# Patient Record
Sex: FEMALE | Race: BLACK OR AFRICAN AMERICAN | NOT HISPANIC OR LATINO | Employment: UNEMPLOYED | URBAN - NONMETROPOLITAN AREA
[De-identification: names, ages, dates, MRNs, and addresses within clinical notes are randomized per-mention and may not be internally consistent; named-entity substitution may affect disease eponyms.]

---

## 2017-01-01 ENCOUNTER — APPOINTMENT (OUTPATIENT)
Dept: GENERAL RADIOLOGY | Facility: HOSPITAL | Age: 37
End: 2017-01-01

## 2017-01-01 ENCOUNTER — APPOINTMENT (OUTPATIENT)
Dept: ULTRASOUND IMAGING | Facility: HOSPITAL | Age: 37
End: 2017-01-01

## 2017-01-01 ENCOUNTER — OFFICE VISIT (OUTPATIENT)
Dept: PAIN MEDICINE | Facility: CLINIC | Age: 37
End: 2017-01-01

## 2017-01-01 ENCOUNTER — APPOINTMENT (OUTPATIENT)
Dept: CT IMAGING | Facility: HOSPITAL | Age: 37
End: 2017-01-01

## 2017-01-01 ENCOUNTER — HOSPITAL ENCOUNTER (INPATIENT)
Facility: HOSPITAL | Age: 37
LOS: 11 days | End: 2017-08-13
Attending: EMERGENCY MEDICINE | Admitting: INTERNAL MEDICINE

## 2017-01-01 ENCOUNTER — OFFICE VISIT (OUTPATIENT)
Dept: FAMILY MEDICINE CLINIC | Facility: CLINIC | Age: 37
End: 2017-01-01

## 2017-01-01 VITALS
BODY MASS INDEX: 20.18 KG/M2 | HEIGHT: 65 IN | WEIGHT: 121.1 LBS | DIASTOLIC BLOOD PRESSURE: 64 MMHG | SYSTOLIC BLOOD PRESSURE: 108 MMHG

## 2017-01-01 VITALS
SYSTOLIC BLOOD PRESSURE: 100 MMHG | HEIGHT: 65 IN | WEIGHT: 117.9 LBS | BODY MASS INDEX: 19.64 KG/M2 | DIASTOLIC BLOOD PRESSURE: 54 MMHG

## 2017-01-01 VITALS
WEIGHT: 129.5 LBS | TEMPERATURE: 98 F | OXYGEN SATURATION: 99 % | DIASTOLIC BLOOD PRESSURE: 70 MMHG | HEART RATE: 131 BPM | SYSTOLIC BLOOD PRESSURE: 120 MMHG | BODY MASS INDEX: 21.58 KG/M2 | HEIGHT: 65 IN

## 2017-01-01 VITALS
DIASTOLIC BLOOD PRESSURE: 80 MMHG | SYSTOLIC BLOOD PRESSURE: 140 MMHG | BODY MASS INDEX: 20.09 KG/M2 | WEIGHT: 125 LBS | HEIGHT: 66 IN

## 2017-01-01 VITALS
HEART RATE: 134 BPM | BODY MASS INDEX: 17.48 KG/M2 | WEIGHT: 104.94 LBS | SYSTOLIC BLOOD PRESSURE: 59 MMHG | HEIGHT: 65 IN | TEMPERATURE: 100.2 F | DIASTOLIC BLOOD PRESSURE: 34 MMHG | RESPIRATION RATE: 28 BRPM

## 2017-01-01 DIAGNOSIS — M54.5 CHRONIC BILATERAL LOW BACK PAIN, WITH SCIATICA PRESENCE UNSPECIFIED: ICD-10-CM

## 2017-01-01 DIAGNOSIS — Z74.09 IMPAIRED MOBILITY AND ACTIVITIES OF DAILY LIVING: ICD-10-CM

## 2017-01-01 DIAGNOSIS — M79.18 MYOFACIAL MUSCLE PAIN: Primary | ICD-10-CM

## 2017-01-01 DIAGNOSIS — E87.1 HYPONATREMIA: ICD-10-CM

## 2017-01-01 DIAGNOSIS — Z74.09 IMPAIRED FUNCTIONAL MOBILITY, BALANCE, GAIT, AND ENDURANCE: ICD-10-CM

## 2017-01-01 DIAGNOSIS — Z76.89 ENCOUNTER TO ESTABLISH CARE WITH NEW DOCTOR: ICD-10-CM

## 2017-01-01 DIAGNOSIS — Z78.9 IMPAIRED MOBILITY AND ACTIVITIES OF DAILY LIVING: ICD-10-CM

## 2017-01-01 DIAGNOSIS — K70.31 ASCITES DUE TO ALCOHOLIC CIRRHOSIS (HCC): ICD-10-CM

## 2017-01-01 DIAGNOSIS — M79.18 MYOFACIAL MUSCLE PAIN: ICD-10-CM

## 2017-01-01 DIAGNOSIS — M54.5 CHRONIC MIDLINE LOW BACK PAIN, WITH SCIATICA PRESENCE UNSPECIFIED: ICD-10-CM

## 2017-01-01 DIAGNOSIS — G89.29 OTHER CHRONIC PAIN: ICD-10-CM

## 2017-01-01 DIAGNOSIS — G89.29 CHRONIC BILATERAL LOW BACK PAIN, WITH SCIATICA PRESENCE UNSPECIFIED: ICD-10-CM

## 2017-01-01 DIAGNOSIS — K70.31 ALCOHOLIC CIRRHOSIS OF LIVER WITH ASCITES (HCC): Primary | ICD-10-CM

## 2017-01-01 DIAGNOSIS — F33.1 MODERATE EPISODE OF RECURRENT MAJOR DEPRESSIVE DISORDER (HCC): ICD-10-CM

## 2017-01-01 DIAGNOSIS — M79.18 MYOFASCIAL PAIN: ICD-10-CM

## 2017-01-01 DIAGNOSIS — K70.31 ASCITES DUE TO ALCOHOLIC CIRRHOSIS (HCC): Primary | ICD-10-CM

## 2017-01-01 DIAGNOSIS — G89.29 CHRONIC PELVIC PAIN IN FEMALE: ICD-10-CM

## 2017-01-01 DIAGNOSIS — R10.2 CHRONIC PELVIC PAIN IN FEMALE: ICD-10-CM

## 2017-01-01 DIAGNOSIS — E87.3 RESPIRATORY ALKALOSIS: ICD-10-CM

## 2017-01-01 DIAGNOSIS — M54.5 CHRONIC MIDLINE LOW BACK PAIN, WITH SCIATICA PRESENCE UNSPECIFIED: Primary | ICD-10-CM

## 2017-01-01 DIAGNOSIS — Z87.828 HISTORY OF MOTOR VEHICLE ACCIDENT: ICD-10-CM

## 2017-01-01 DIAGNOSIS — R52 ACUTE PAIN: ICD-10-CM

## 2017-01-01 DIAGNOSIS — G89.29 CHRONIC MIDLINE LOW BACK PAIN, WITH SCIATICA PRESENCE UNSPECIFIED: ICD-10-CM

## 2017-01-01 DIAGNOSIS — R10.2 PELVIC PAIN: ICD-10-CM

## 2017-01-01 DIAGNOSIS — E16.2 HYPOGLYCEMIA: ICD-10-CM

## 2017-01-01 DIAGNOSIS — G89.29 CHRONIC MIDLINE LOW BACK PAIN, WITH SCIATICA PRESENCE UNSPECIFIED: Primary | ICD-10-CM

## 2017-01-01 DIAGNOSIS — M79.18 MYOFASCIAL PAIN: Primary | ICD-10-CM

## 2017-01-01 DIAGNOSIS — D64.9 ANEMIA, UNSPECIFIED TYPE: ICD-10-CM

## 2017-01-01 DIAGNOSIS — G47.01 INSOMNIA DUE TO MEDICAL CONDITION: ICD-10-CM

## 2017-01-01 DIAGNOSIS — K70.31 ALCOHOLIC CIRRHOSIS OF LIVER WITH ASCITES (HCC): ICD-10-CM

## 2017-01-01 LAB
25(OH)D3 SERPL-MCNC: <12.8 NG/ML (ref 30–100)
A1AT SERPL-MCNC: 268 MG/DL (ref 90–200)
A1AT SERPL-MCNC: 275 MG/DL (ref 90–200)
ABO + RH BLD: NORMAL
ABO + RH BLD: NORMAL
ABO GROUP BLD: NORMAL
ABO GROUP BLD: NORMAL
ACTIN IGG SERPL-ACNC: 13 UNITS (ref 0–19)
AFP-TM SERPL-MCNC: 8.8 NG/ML (ref 0–8.3)
ALBUMIN SERPL-MCNC: 1.9 G/DL (ref 3.4–4.8)
ALBUMIN SERPL-MCNC: 2 G/DL (ref 3.4–4.8)
ALBUMIN SERPL-MCNC: 2.1 G/DL (ref 3.4–4.8)
ALBUMIN SERPL-MCNC: 2.1 G/DL (ref 3.4–4.8)
ALBUMIN SERPL-MCNC: 2.7 G/DL (ref 3.4–4.8)
ALBUMIN SERPL-MCNC: 3.1 G/DL (ref 3.4–4.8)
ALBUMIN/GLOB SERPL: 0.7 G/DL (ref 1.1–1.8)
ALBUMIN/GLOB SERPL: 0.7 G/DL (ref 1.1–1.8)
ALBUMIN/GLOB SERPL: 0.8 G/DL (ref 1.1–1.8)
ALBUMIN/GLOB SERPL: 0.9 G/DL (ref 1.1–1.8)
ALBUMIN/GLOB SERPL: 1 G/DL (ref 1.1–1.8)
ALBUMIN/GLOB SERPL: 1.1 G/DL (ref 1.1–1.8)
ALP SERPL-CCNC: 137 U/L (ref 38–126)
ALP SERPL-CCNC: 74 U/L (ref 38–126)
ALP SERPL-CCNC: 75 U/L (ref 38–126)
ALP SERPL-CCNC: 77 U/L (ref 38–126)
ALP SERPL-CCNC: 78 U/L (ref 38–126)
ALP SERPL-CCNC: 79 U/L (ref 38–126)
ALP SERPL-CCNC: 81 U/L (ref 38–126)
ALP SERPL-CCNC: 81 U/L (ref 38–126)
ALP SERPL-CCNC: 86 U/L (ref 38–126)
ALP SERPL-CCNC: 87 U/L (ref 38–126)
ALP SERPL-CCNC: 92 U/L (ref 38–126)
ALP SERPL-CCNC: 99 U/L (ref 38–126)
ALT SERPL W P-5'-P-CCNC: 36 U/L (ref 9–52)
ALT SERPL W P-5'-P-CCNC: 42 U/L (ref 9–52)
ALT SERPL W P-5'-P-CCNC: 42 U/L (ref 9–52)
ALT SERPL W P-5'-P-CCNC: 48 U/L (ref 9–52)
ALT SERPL W P-5'-P-CCNC: 48 U/L (ref 9–52)
ALT SERPL W P-5'-P-CCNC: 51 U/L (ref 9–52)
ALT SERPL W P-5'-P-CCNC: 57 U/L (ref 9–52)
ALT SERPL W P-5'-P-CCNC: 57 U/L (ref 9–52)
ALT SERPL W P-5'-P-CCNC: 63 U/L (ref 9–52)
ALT SERPL W P-5'-P-CCNC: 63 U/L (ref 9–52)
ALT SERPL W P-5'-P-CCNC: 68 U/L (ref 9–52)
ALT SERPL W P-5'-P-CCNC: 93 U/L (ref 9–52)
AMMONIA BLD-SCNC: 12 UMOL/L (ref 9–30)
AMMONIA BLD-SCNC: 59 UMOL/L (ref 9–30)
AMMONIA BLD-SCNC: <9 UMOL/L (ref 9–30)
AMPHET+METHAMPHET UR QL: NEGATIVE
AMYLASE SERPL-CCNC: 318 U/L (ref 50–130)
ANA SER QL: NEGATIVE
ANION GAP SERPL CALCULATED.3IONS-SCNC: 10 MMOL/L (ref 5–15)
ANION GAP SERPL CALCULATED.3IONS-SCNC: 11 MMOL/L (ref 5–15)
ANION GAP SERPL CALCULATED.3IONS-SCNC: 14 MMOL/L (ref 5–15)
ANION GAP SERPL CALCULATED.3IONS-SCNC: 17 MMOL/L (ref 5–15)
ANION GAP SERPL CALCULATED.3IONS-SCNC: 4 MMOL/L (ref 5–15)
ANION GAP SERPL CALCULATED.3IONS-SCNC: 5 MMOL/L (ref 5–15)
ANION GAP SERPL CALCULATED.3IONS-SCNC: 6 MMOL/L (ref 5–15)
ANION GAP SERPL CALCULATED.3IONS-SCNC: 7 MMOL/L (ref 5–15)
ANION GAP SERPL CALCULATED.3IONS-SCNC: 7 MMOL/L (ref 5–15)
ANISOCYTOSIS BLD QL: ABNORMAL
ANISOCYTOSIS BLD QL: NORMAL
APTT PPP: 26 SECONDS (ref 20–40.3)
ARTERIAL PATENCY WRIST A: ABNORMAL
ARTERIAL PATENCY WRIST A: ABNORMAL
AST SERPL-CCNC: 14 U/L (ref 14–36)
AST SERPL-CCNC: 147 U/L (ref 14–36)
AST SERPL-CCNC: 15 U/L (ref 14–36)
AST SERPL-CCNC: 16 U/L (ref 14–36)
AST SERPL-CCNC: 18 U/L (ref 14–36)
AST SERPL-CCNC: 30 U/L (ref 14–36)
AST SERPL-CCNC: 33 U/L (ref 14–36)
AST SERPL-CCNC: 34 U/L (ref 14–36)
AST SERPL-CCNC: 59 U/L (ref 14–36)
AST SERPL-CCNC: 65 U/L (ref 14–36)
AST SERPL-CCNC: 72 U/L (ref 14–36)
AST SERPL-CCNC: 93 U/L (ref 14–36)
ATMOSPHERIC PRESS: ABNORMAL MMHG
ATMOSPHERIC PRESS: ABNORMAL MMHG
BACTERIA BLD CULT: ABNORMAL
BACTERIA ID TEST ISLT QL CULT: ABNORMAL
BACTERIA SPEC AEROBE CULT: NORMAL
BACTERIA UR QL AUTO: ABNORMAL /HPF
BACTERIA UR QL AUTO: ABNORMAL /HPF
BARBITURATES UR QL SCN: NEGATIVE
BASE EXCESS BLDA CALC-SCNC: -0.2 MMOL/L (ref -2.4–2.4)
BASE EXCESS BLDA CALC-SCNC: 2.9 MMOL/L (ref -2.4–2.4)
BASOPHILS # BLD AUTO: 0 10*3/MM3 (ref 0–0.2)
BASOPHILS # BLD AUTO: 0 10*3/MM3 (ref 0–0.2)
BASOPHILS # BLD AUTO: 0.01 10*3/MM3 (ref 0–0.2)
BASOPHILS # BLD AUTO: 0.02 10*3/MM3 (ref 0–0.2)
BASOPHILS # BLD AUTO: 0.03 10*3/MM3 (ref 0–0.2)
BASOPHILS # BLD AUTO: 0.03 10*3/MM3 (ref 0–0.2)
BASOPHILS # BLD MANUAL: 0.06 10*3/MM3 (ref 0–0.2)
BASOPHILS NFR BLD AUTO: 0 % (ref 0–2)
BASOPHILS NFR BLD AUTO: 0 % (ref 0–2)
BASOPHILS NFR BLD AUTO: 0.1 % (ref 0–2)
BASOPHILS NFR BLD AUTO: 0.2 % (ref 0–2)
BASOPHILS NFR BLD AUTO: 0.3 % (ref 0–2)
BASOPHILS NFR BLD AUTO: 0.5 % (ref 0–2)
BASOPHILS NFR BLD AUTO: 1 % (ref 0–2)
BDY SITE: ABNORMAL
BDY SITE: ABNORMAL
BENZODIAZ UR QL SCN: NEGATIVE
BH BB BLOOD EXPIRATION DATE: NORMAL
BH BB BLOOD EXPIRATION DATE: NORMAL
BH BB BLOOD TYPE BARCODE: 5100
BH BB BLOOD TYPE BARCODE: 5100
BH BB DISPENSE STATUS: NORMAL
BH BB DISPENSE STATUS: NORMAL
BH BB PRODUCT CODE: NORMAL
BH BB PRODUCT CODE: NORMAL
BH BB UNIT NUMBER: NORMAL
BH BB UNIT NUMBER: NORMAL
BILIRUB CONJ SERPL-MCNC: 0 MG/DL (ref 0–0.3)
BILIRUB CONJ SERPL-MCNC: 0.2 MG/DL (ref 0–0.3)
BILIRUB CONJ SERPL-MCNC: 0.3 MG/DL (ref 0–0.3)
BILIRUB CONJ SERPL-MCNC: 0.4 MG/DL (ref 0–0.3)
BILIRUB SERPL-MCNC: 1 MG/DL (ref 0.2–1.3)
BILIRUB SERPL-MCNC: 1.1 MG/DL (ref 0.2–1.3)
BILIRUB SERPL-MCNC: 1.2 MG/DL (ref 0.2–1.3)
BILIRUB SERPL-MCNC: 1.2 MG/DL (ref 0.2–1.3)
BILIRUB SERPL-MCNC: 1.4 MG/DL (ref 0.2–1.3)
BILIRUB SERPL-MCNC: 1.5 MG/DL (ref 0.2–1.3)
BILIRUB SERPL-MCNC: 1.6 MG/DL (ref 0.2–1.3)
BILIRUB SERPL-MCNC: 2 MG/DL (ref 0.2–1.3)
BILIRUB SERPL-MCNC: 2.2 MG/DL (ref 0.2–1.3)
BILIRUB SERPL-MCNC: 2.6 MG/DL (ref 0.2–1.3)
BILIRUB SERPL-MCNC: 3.5 MG/DL (ref 0.2–1.3)
BILIRUB SERPL-MCNC: 7.6 MG/DL (ref 0.2–1.3)
BILIRUB UR QL STRIP: ABNORMAL
BILIRUB UR QL STRIP: ABNORMAL
BLD GP AB SCN SERPL QL: NEGATIVE
BLD GP AB SCN SERPL QL: NEGATIVE
BUN BLD-MCNC: 10 MG/DL (ref 7–21)
BUN BLD-MCNC: 11 MG/DL (ref 7–21)
BUN BLD-MCNC: 14 MG/DL (ref 7–21)
BUN BLD-MCNC: 14 MG/DL (ref 7–21)
BUN BLD-MCNC: 18 MG/DL (ref 7–21)
BUN BLD-MCNC: 24 MG/DL (ref 7–21)
BUN BLD-MCNC: 26 MG/DL (ref 7–21)
BUN BLD-MCNC: 26 MG/DL (ref 7–21)
BUN BLD-MCNC: 29 MG/DL (ref 7–21)
BUN BLD-MCNC: 36 MG/DL (ref 7–21)
BUN BLD-MCNC: 38 MG/DL (ref 7–21)
BUN/CREAT SERPL: 22.7 (ref 7–25)
BUN/CREAT SERPL: 25.6 (ref 7–25)
BUN/CREAT SERPL: 26.4 (ref 7–25)
BUN/CREAT SERPL: 26.8 (ref 7–25)
BUN/CREAT SERPL: 26.8 (ref 7–25)
BUN/CREAT SERPL: 31.1 (ref 7–25)
BUN/CREAT SERPL: 33 (ref 7–25)
BUN/CREAT SERPL: 35.3 (ref 7–25)
BUN/CREAT SERPL: 38.7 (ref 7–25)
BUN/CREAT SERPL: 40.4 (ref 7–25)
BUN/CREAT SERPL: 44.8 (ref 7–25)
BUN/CREAT SERPL: 46.4 (ref 7–25)
BUN/CREAT SERPL: 48 (ref 7–25)
CA-I BLD-MCNC: 3.7 MG/DL (ref 4.5–4.9)
CA-I BLD-MCNC: 3.9 MG/DL (ref 4.5–4.9)
CA-I BLD-MCNC: 4 MG/DL (ref 4.5–4.9)
CALCIUM SPEC-SCNC: 6.4 MG/DL (ref 8.4–10.2)
CALCIUM SPEC-SCNC: 6.6 MG/DL (ref 8.4–10.2)
CALCIUM SPEC-SCNC: 6.7 MG/DL (ref 8.4–10.2)
CALCIUM SPEC-SCNC: 6.9 MG/DL (ref 8.4–10.2)
CALCIUM SPEC-SCNC: 6.9 MG/DL (ref 8.4–10.2)
CALCIUM SPEC-SCNC: 7 MG/DL (ref 8.4–10.2)
CALCIUM SPEC-SCNC: 7.1 MG/DL (ref 8.4–10.2)
CALCIUM SPEC-SCNC: 7.6 MG/DL (ref 8.4–10.2)
CALCIUM SPEC-SCNC: 7.8 MG/DL (ref 8.4–10.2)
CALCIUM SPEC-SCNC: 7.8 MG/DL (ref 8.4–10.2)
CALCIUM SPEC-SCNC: 8 MG/DL (ref 8.4–10.2)
CANNABINOIDS SERPL QL: POSITIVE
CERULOPLASMIN SERPL-MCNC: 13 MG/DL (ref 19–39)
CHLORIDE SERPL-SCNC: 100 MMOL/L (ref 95–110)
CHLORIDE SERPL-SCNC: 86 MMOL/L (ref 95–110)
CHLORIDE SERPL-SCNC: 88 MMOL/L (ref 95–110)
CHLORIDE SERPL-SCNC: 92 MMOL/L (ref 95–110)
CHLORIDE SERPL-SCNC: 92 MMOL/L (ref 95–110)
CHLORIDE SERPL-SCNC: 93 MMOL/L (ref 95–110)
CHLORIDE SERPL-SCNC: 94 MMOL/L (ref 95–110)
CHLORIDE SERPL-SCNC: 95 MMOL/L (ref 95–110)
CHLORIDE SERPL-SCNC: 96 MMOL/L (ref 95–110)
CHLORIDE SERPL-SCNC: 97 MMOL/L (ref 95–110)
CHOLEST SERPL-MCNC: <50 MG/DL (ref 0–199)
CLARITY UR: ABNORMAL
CLARITY UR: CLEAR
CLUMPED PLATELETS: NORMAL
CO2 BLDA-SCNC: 22.4 MMOL/L (ref 23–27)
CO2 BLDA-SCNC: 27.1 MMOL/L (ref 23–27)
CO2 SERPL-SCNC: 22 MMOL/L (ref 22–31)
CO2 SERPL-SCNC: 25 MMOL/L (ref 22–31)
CO2 SERPL-SCNC: 26 MMOL/L (ref 22–31)
CO2 SERPL-SCNC: 27 MMOL/L (ref 22–31)
CO2 SERPL-SCNC: 27 MMOL/L (ref 22–31)
CO2 SERPL-SCNC: 29 MMOL/L (ref 22–31)
CO2 SERPL-SCNC: 29 MMOL/L (ref 22–31)
CO2 SERPL-SCNC: 30 MMOL/L (ref 22–31)
CO2 SERPL-SCNC: 31 MMOL/L (ref 22–31)
CO2 SERPL-SCNC: 31 MMOL/L (ref 22–31)
COCAINE UR QL: NEGATIVE
COLOR UR: ABNORMAL
COLOR UR: YELLOW
CREAT BLD-MCNC: 0.41 MG/DL (ref 0.5–1)
CREAT BLD-MCNC: 0.41 MG/DL (ref 0.5–1)
CREAT BLD-MCNC: 0.43 MG/DL (ref 0.5–1)
CREAT BLD-MCNC: 0.44 MG/DL (ref 0.5–1)
CREAT BLD-MCNC: 0.45 MG/DL (ref 0.5–1)
CREAT BLD-MCNC: 0.5 MG/DL (ref 0.5–1)
CREAT BLD-MCNC: 0.51 MG/DL (ref 0.5–1)
CREAT BLD-MCNC: 0.53 MG/DL (ref 0.5–1)
CREAT BLD-MCNC: 0.56 MG/DL (ref 0.5–1)
CREAT BLD-MCNC: 0.58 MG/DL (ref 0.5–1)
CREAT BLD-MCNC: 0.88 MG/DL (ref 0.5–1)
CREAT BLD-MCNC: 0.93 MG/DL (ref 0.5–1)
CREAT BLD-MCNC: 0.94 MG/DL (ref 0.5–1)
CRP SERPL-MCNC: 32.6 MG/DL (ref 0–1)
DEPRECATED RDW RBC AUTO: 46.5 FL (ref 36.4–46.3)
DEPRECATED RDW RBC AUTO: 46.7 FL (ref 36.4–46.3)
DEPRECATED RDW RBC AUTO: 46.8 FL (ref 36.4–46.3)
DEPRECATED RDW RBC AUTO: 47 FL (ref 36.4–46.3)
DEPRECATED RDW RBC AUTO: 47.1 FL (ref 36.4–46.3)
DEPRECATED RDW RBC AUTO: 47.1 FL (ref 36.4–46.3)
DEPRECATED RDW RBC AUTO: 47.3 FL (ref 36.4–46.3)
DEPRECATED RDW RBC AUTO: 47.5 FL (ref 36.4–46.3)
DEPRECATED RDW RBC AUTO: 47.6 FL (ref 36.4–46.3)
DEPRECATED RDW RBC AUTO: 47.7 FL (ref 36.4–46.3)
DEPRECATED RDW RBC AUTO: 47.8 FL (ref 36.4–46.3)
DEPRECATED RDW RBC AUTO: 48.2 FL (ref 36.4–46.3)
DEPRECATED RDW RBC AUTO: 48.2 FL (ref 36.4–46.3)
DEPRECATED RDW RBC AUTO: 48.4 FL (ref 36.4–46.3)
DEPRECATED RDW RBC AUTO: 48.4 FL (ref 36.4–46.3)
DEPRECATED RDW RBC AUTO: 48.5 FL (ref 36.4–46.3)
DEPRECATED RDW RBC AUTO: 48.6 FL (ref 36.4–46.3)
DEPRECATED RDW RBC AUTO: 48.6 FL (ref 36.4–46.3)
DEPRECATED RDW RBC AUTO: 49 FL (ref 36.4–46.3)
DEPRECATED RDW RBC AUTO: 49.1 FL (ref 36.4–46.3)
DEPRECATED RDW RBC AUTO: 49.6 FL (ref 36.4–46.3)
DEPRECATED RDW RBC AUTO: 49.7 FL (ref 36.4–46.3)
DOHLE BODIES: PRESENT
EOSINOPHIL # BLD AUTO: 0 10*3/MM3 (ref 0–0.7)
EOSINOPHIL # BLD AUTO: 0.01 10*3/MM3 (ref 0–0.7)
EOSINOPHIL # BLD AUTO: 0.02 10*3/MM3 (ref 0–0.7)
EOSINOPHIL # BLD AUTO: 0.04 10*3/MM3 (ref 0–0.7)
EOSINOPHIL # BLD AUTO: 0.05 10*3/MM3 (ref 0–0.7)
EOSINOPHIL # BLD MANUAL: 0.42 10*3/MM3 (ref 0–0.7)
EOSINOPHIL NFR BLD AUTO: 0 % (ref 0–7)
EOSINOPHIL NFR BLD AUTO: 0.1 % (ref 0–7)
EOSINOPHIL NFR BLD AUTO: 0.1 % (ref 0–7)
EOSINOPHIL NFR BLD AUTO: 0.2 % (ref 0–7)
EOSINOPHIL NFR BLD AUTO: 0.3 % (ref 0–7)
EOSINOPHIL NFR BLD AUTO: 0.5 % (ref 0–7)
EOSINOPHIL NFR BLD AUTO: 0.6 % (ref 0–7)
EOSINOPHIL NFR BLD MANUAL: 3 % (ref 0–7)
ERYTHROCYTE [DISTWIDTH] IN BLOOD BY AUTOMATED COUNT: 14.7 % (ref 11.5–14.5)
ERYTHROCYTE [DISTWIDTH] IN BLOOD BY AUTOMATED COUNT: 14.8 % (ref 11.5–14.5)
ERYTHROCYTE [DISTWIDTH] IN BLOOD BY AUTOMATED COUNT: 14.9 % (ref 11.5–14.5)
ERYTHROCYTE [DISTWIDTH] IN BLOOD BY AUTOMATED COUNT: 14.9 % (ref 11.5–14.5)
ERYTHROCYTE [DISTWIDTH] IN BLOOD BY AUTOMATED COUNT: 15 % (ref 11.5–14.5)
ERYTHROCYTE [DISTWIDTH] IN BLOOD BY AUTOMATED COUNT: 15.1 % (ref 11.5–14.5)
ERYTHROCYTE [DISTWIDTH] IN BLOOD BY AUTOMATED COUNT: 15.2 % (ref 11.5–14.5)
ERYTHROCYTE [DISTWIDTH] IN BLOOD BY AUTOMATED COUNT: 15.3 % (ref 11.5–14.5)
ERYTHROCYTE [DISTWIDTH] IN BLOOD BY AUTOMATED COUNT: 15.4 % (ref 11.5–14.5)
ERYTHROCYTE [DISTWIDTH] IN BLOOD BY AUTOMATED COUNT: 15.9 % (ref 11.5–14.5)
GFR SERPL CREATININE-BSD FRML MDRD: 141 ML/MIN/1.73 (ref 64–149)
GFR SERPL CREATININE-BSD FRML MDRD: 147 ML/MIN/1.73 (ref 64–149)
GFR SERPL CREATININE-BSD FRML MDRD: 156 ML/MIN/1.73 (ref 64–149)
GFR SERPL CREATININE-BSD FRML MDRD: 164 ML/MIN/1.73 (ref 64–149)
GFR SERPL CREATININE-BSD FRML MDRD: 167 ML/MIN/1.73 (ref 64–149)
GFR SERPL CREATININE-BSD FRML MDRD: 189 ML/MIN/1.73 (ref 64–149)
GFR SERPL CREATININE-BSD FRML MDRD: 194 ML/MIN/1.73 (ref 64–149)
GFR SERPL CREATININE-BSD FRML MDRD: 199 ML/MIN/1.73 (ref 64–149)
GFR SERPL CREATININE-BSD FRML MDRD: 210 ML/MIN/1.73 (ref 64–149)
GFR SERPL CREATININE-BSD FRML MDRD: 210 ML/MIN/1.73 (ref 64–149)
GFR SERPL CREATININE-BSD FRML MDRD: 81 ML/MIN/1.73 (ref 64–149)
GFR SERPL CREATININE-BSD FRML MDRD: 82 ML/MIN/1.73 (ref 64–149)
GFR SERPL CREATININE-BSD FRML MDRD: 87 ML/MIN/1.73 (ref 64–149)
GLOBULIN UR ELPH-MCNC: 2.2 GM/DL (ref 2.3–3.5)
GLOBULIN UR ELPH-MCNC: 2.2 GM/DL (ref 2.3–3.5)
GLOBULIN UR ELPH-MCNC: 2.3 GM/DL (ref 2.3–3.5)
GLOBULIN UR ELPH-MCNC: 2.3 GM/DL (ref 2.3–3.5)
GLOBULIN UR ELPH-MCNC: 2.4 GM/DL (ref 2.3–3.5)
GLOBULIN UR ELPH-MCNC: 2.5 GM/DL (ref 2.3–3.5)
GLOBULIN UR ELPH-MCNC: 2.5 GM/DL (ref 2.3–3.5)
GLOBULIN UR ELPH-MCNC: 2.6 GM/DL (ref 2.3–3.5)
GLOBULIN UR ELPH-MCNC: 2.6 GM/DL (ref 2.3–3.5)
GLOBULIN UR ELPH-MCNC: 2.7 GM/DL (ref 2.3–3.5)
GLOBULIN UR ELPH-MCNC: 2.8 GM/DL (ref 2.3–3.5)
GLOBULIN UR ELPH-MCNC: 2.9 GM/DL (ref 2.3–3.5)
GLUCOSE BLD-MCNC: 106 MG/DL (ref 60–100)
GLUCOSE BLD-MCNC: 116 MG/DL (ref 60–100)
GLUCOSE BLD-MCNC: 34 MG/DL (ref 60–100)
GLUCOSE BLD-MCNC: 45 MG/DL (ref 60–100)
GLUCOSE BLD-MCNC: 64 MG/DL (ref 60–100)
GLUCOSE BLD-MCNC: 66 MG/DL (ref 60–100)
GLUCOSE BLD-MCNC: 68 MG/DL (ref 60–100)
GLUCOSE BLD-MCNC: 70 MG/DL (ref 60–100)
GLUCOSE BLD-MCNC: 73 MG/DL (ref 60–100)
GLUCOSE BLD-MCNC: 73 MG/DL (ref 60–100)
GLUCOSE BLD-MCNC: 74 MG/DL (ref 60–100)
GLUCOSE BLD-MCNC: 98 MG/DL (ref 60–100)
GLUCOSE BLD-MCNC: 99 MG/DL (ref 60–100)
GLUCOSE BLDA-MCNC: 40 MMOL/L
GLUCOSE BLDA-MCNC: 64 MMOL/L
GLUCOSE BLDC GLUCOMTR-MCNC: 106 MG/DL (ref 70–130)
GLUCOSE BLDC GLUCOMTR-MCNC: 162 MG/DL (ref 70–130)
GLUCOSE BLDC GLUCOMTR-MCNC: 67 MG/DL (ref 70–130)
GLUCOSE UR STRIP-MCNC: NEGATIVE MG/DL
GLUCOSE UR STRIP-MCNC: NEGATIVE MG/DL
HCG SERPL QL: NEGATIVE
HCO3 BLDA-SCNC: 21.6 MMOL/L (ref 22–26)
HCO3 BLDA-SCNC: 26 MMOL/L (ref 22–26)
HCT VFR BLD AUTO: 17.7 % (ref 35–45)
HCT VFR BLD AUTO: 19.8 % (ref 35–45)
HCT VFR BLD AUTO: 20.2 % (ref 35–45)
HCT VFR BLD AUTO: 21.4 % (ref 35–45)
HCT VFR BLD AUTO: 24 % (ref 35–45)
HCT VFR BLD AUTO: 24.7 % (ref 35–45)
HCT VFR BLD AUTO: 25.1 % (ref 35–45)
HCT VFR BLD AUTO: 25.5 % (ref 35–45)
HCT VFR BLD AUTO: 25.7 % (ref 35–45)
HCT VFR BLD AUTO: 25.8 % (ref 35–45)
HCT VFR BLD AUTO: 26 % (ref 35–45)
HCT VFR BLD AUTO: 26 % (ref 35–45)
HCT VFR BLD AUTO: 26.3 % (ref 35–45)
HCT VFR BLD AUTO: 26.4 % (ref 35–45)
HCT VFR BLD AUTO: 26.5 % (ref 35–45)
HCT VFR BLD AUTO: 26.6 % (ref 35–45)
HCT VFR BLD AUTO: 26.7 % (ref 35–45)
HCT VFR BLD AUTO: 27 % (ref 35–45)
HCT VFR BLD AUTO: 27.2 % (ref 35–45)
HCT VFR BLD AUTO: 27.7 % (ref 35–45)
HCT VFR BLD AUTO: 27.9 % (ref 35–45)
HCT VFR BLD AUTO: 28.4 % (ref 35–45)
HCT VFR BLD AUTO: 28.7 % (ref 35–45)
HCT VFR BLD AUTO: 29.8 % (ref 35–45)
HCT VFR BLD AUTO: 30 % (ref 35–45)
HCT VFR BLD AUTO: 30.7 % (ref 35–45)
HCT VFR BLD CALC: 28 % (ref 38–47)
HCT VFR BLD CALC: 28 % (ref 38–47)
HGB BLD-MCNC: 10.2 G/DL (ref 12–15.5)
HGB BLD-MCNC: 10.2 G/DL (ref 12–15.5)
HGB BLD-MCNC: 10.3 G/DL (ref 12–15.5)
HGB BLD-MCNC: 5.9 G/DL (ref 12–15.5)
HGB BLD-MCNC: 6.8 G/DL (ref 12–15.5)
HGB BLD-MCNC: 6.9 G/DL (ref 12–15.5)
HGB BLD-MCNC: 7.1 G/DL (ref 12–15.5)
HGB BLD-MCNC: 8.3 G/DL (ref 12–15.5)
HGB BLD-MCNC: 8.4 G/DL (ref 12–15.5)
HGB BLD-MCNC: 8.5 G/DL (ref 12–15.5)
HGB BLD-MCNC: 8.5 G/DL (ref 12–15.5)
HGB BLD-MCNC: 8.6 G/DL (ref 12–15.5)
HGB BLD-MCNC: 8.8 G/DL (ref 12–15.5)
HGB BLD-MCNC: 8.9 G/DL (ref 12–15.5)
HGB BLD-MCNC: 8.9 G/DL (ref 12–15.5)
HGB BLD-MCNC: 9 G/DL (ref 12–15.5)
HGB BLD-MCNC: 9.2 G/DL (ref 12–15.5)
HGB BLD-MCNC: 9.2 G/DL (ref 12–15.5)
HGB BLD-MCNC: 9.3 G/DL (ref 12–15.5)
HGB BLD-MCNC: 9.3 G/DL (ref 12–15.5)
HGB BLD-MCNC: 9.5 G/DL (ref 12–15.5)
HGB BLD-MCNC: 9.6 G/DL (ref 12–15.5)
HGB BLDA-MCNC: 9.5 G/DL (ref 12–16)
HGB BLDA-MCNC: 9.6 G/DL (ref 12–16)
HGB UR QL STRIP.AUTO: ABNORMAL
HGB UR QL STRIP.AUTO: NEGATIVE
HOLD SPECIMEN: NORMAL
HYALINE CASTS UR QL AUTO: ABNORMAL /LPF
HYALINE CASTS UR QL AUTO: ABNORMAL /LPF
HYPOCHROMIA BLD QL: ABNORMAL
HYPOCHROMIA BLD QL: ABNORMAL
HYPOCHROMIA BLD QL: NORMAL
IMM GRANULOCYTES # BLD: 0.01 10*3/MM3 (ref 0–0.02)
IMM GRANULOCYTES # BLD: 0.02 10*3/MM3 (ref 0–0.02)
IMM GRANULOCYTES # BLD: 0.03 10*3/MM3 (ref 0–0.02)
IMM GRANULOCYTES # BLD: 0.04 10*3/MM3 (ref 0–0.02)
IMM GRANULOCYTES # BLD: 0.05 10*3/MM3 (ref 0–0.02)
IMM GRANULOCYTES # BLD: 0.06 10*3/MM3 (ref 0–0.02)
IMM GRANULOCYTES # BLD: 0.07 10*3/MM3 (ref 0–0.02)
IMM GRANULOCYTES # BLD: 0.07 10*3/MM3 (ref 0–0.02)
IMM GRANULOCYTES # BLD: 0.08 10*3/MM3 (ref 0–0.02)
IMM GRANULOCYTES # BLD: 0.09 10*3/MM3 (ref 0–0.02)
IMM GRANULOCYTES # BLD: 0.09 10*3/MM3 (ref 0–0.02)
IMM GRANULOCYTES # BLD: 0.1 10*3/MM3 (ref 0–0.02)
IMM GRANULOCYTES # BLD: 0.9 10*3/MM3 (ref 0–0.02)
IMM GRANULOCYTES NFR BLD: 0.2 % (ref 0–0.5)
IMM GRANULOCYTES NFR BLD: 0.3 % (ref 0–0.5)
IMM GRANULOCYTES NFR BLD: 0.5 % (ref 0–0.5)
IMM GRANULOCYTES NFR BLD: 0.6 % (ref 0–0.5)
IMM GRANULOCYTES NFR BLD: 0.7 % (ref 0–0.5)
IMM GRANULOCYTES NFR BLD: 0.8 % (ref 0–0.5)
IMM GRANULOCYTES NFR BLD: 0.9 % (ref 0–0.5)
IMM GRANULOCYTES NFR BLD: 1 % (ref 0–0.5)
IMM GRANULOCYTES NFR BLD: 1.1 % (ref 0–0.5)
IMM GRANULOCYTES NFR BLD: 1.1 % (ref 0–0.5)
IMM GRANULOCYTES NFR BLD: 1.5 % (ref 0–0.5)
IMM GRANULOCYTES NFR BLD: 1.8 % (ref 0–0.5)
IMM GRANULOCYTES NFR BLD: 2 % (ref 0–0.5)
IMM GRANULOCYTES NFR BLD: 6.4 % (ref 0–0.5)
INR PPP: 0.92 (ref 0.8–1.2)
IRON 24H UR-MRATE: <10 MCG/DL (ref 37–170)
IRON SATN MFR SERPL: ABNORMAL % (ref 15–50)
KETONES UR QL STRIP: ABNORMAL
KETONES UR QL STRIP: ABNORMAL
LEUKOCYTE ESTERASE UR QL STRIP.AUTO: ABNORMAL
LEUKOCYTE ESTERASE UR QL STRIP.AUTO: ABNORMAL
LIPASE SERPL-CCNC: 421 U/L (ref 23–300)
LYMPHOCYTES # BLD AUTO: 0.15 10*3/MM3 (ref 0.6–4.2)
LYMPHOCYTES # BLD AUTO: 0.21 10*3/MM3 (ref 0.6–4.2)
LYMPHOCYTES # BLD AUTO: 0.23 10*3/MM3 (ref 0.6–4.2)
LYMPHOCYTES # BLD AUTO: 0.27 10*3/MM3 (ref 0.6–4.2)
LYMPHOCYTES # BLD AUTO: 0.28 10*3/MM3 (ref 0.6–4.2)
LYMPHOCYTES # BLD AUTO: 0.29 10*3/MM3 (ref 0.6–4.2)
LYMPHOCYTES # BLD AUTO: 0.33 10*3/MM3 (ref 0.6–4.2)
LYMPHOCYTES # BLD AUTO: 0.37 10*3/MM3 (ref 0.6–4.2)
LYMPHOCYTES # BLD AUTO: 0.39 10*3/MM3 (ref 0.6–4.2)
LYMPHOCYTES # BLD AUTO: 0.39 10*3/MM3 (ref 0.6–4.2)
LYMPHOCYTES # BLD AUTO: 0.4 10*3/MM3 (ref 0.6–4.2)
LYMPHOCYTES # BLD AUTO: 0.48 10*3/MM3 (ref 0.6–4.2)
LYMPHOCYTES # BLD AUTO: 0.5 10*3/MM3 (ref 0.6–4.2)
LYMPHOCYTES # BLD AUTO: 0.5 10*3/MM3 (ref 0.6–4.2)
LYMPHOCYTES # BLD AUTO: 0.53 10*3/MM3 (ref 0.6–4.2)
LYMPHOCYTES # BLD AUTO: 0.59 10*3/MM3 (ref 0.6–4.2)
LYMPHOCYTES # BLD AUTO: 0.6 10*3/MM3 (ref 0.6–4.2)
LYMPHOCYTES # BLD AUTO: 0.67 10*3/MM3 (ref 0.6–4.2)
LYMPHOCYTES # BLD MANUAL: 0.36 10*3/MM3 (ref 0.6–4.2)
LYMPHOCYTES # BLD MANUAL: 2.68 10*3/MM3 (ref 0.6–4.2)
LYMPHOCYTES NFR BLD AUTO: 1.8 % (ref 10–50)
LYMPHOCYTES NFR BLD AUTO: 11.6 % (ref 10–50)
LYMPHOCYTES NFR BLD AUTO: 3.5 % (ref 10–50)
LYMPHOCYTES NFR BLD AUTO: 4.4 % (ref 10–50)
LYMPHOCYTES NFR BLD AUTO: 5.1 % (ref 10–50)
LYMPHOCYTES NFR BLD AUTO: 5.4 % (ref 10–50)
LYMPHOCYTES NFR BLD AUTO: 5.5 % (ref 10–50)
LYMPHOCYTES NFR BLD AUTO: 5.6 % (ref 10–50)
LYMPHOCYTES NFR BLD AUTO: 5.6 % (ref 10–50)
LYMPHOCYTES NFR BLD AUTO: 5.9 % (ref 10–50)
LYMPHOCYTES NFR BLD AUTO: 6.2 % (ref 10–50)
LYMPHOCYTES NFR BLD AUTO: 6.3 % (ref 10–50)
LYMPHOCYTES NFR BLD AUTO: 6.4 % (ref 10–50)
LYMPHOCYTES NFR BLD AUTO: 6.4 % (ref 10–50)
LYMPHOCYTES NFR BLD AUTO: 7.1 % (ref 10–50)
LYMPHOCYTES NFR BLD AUTO: 7.6 % (ref 10–50)
LYMPHOCYTES NFR BLD AUTO: 8.2 % (ref 10–50)
LYMPHOCYTES NFR BLD AUTO: 8.4 % (ref 10–50)
LYMPHOCYTES NFR BLD AUTO: 9.2 % (ref 10–50)
LYMPHOCYTES NFR BLD AUTO: 9.3 % (ref 10–50)
LYMPHOCYTES NFR BLD MANUAL: 19 % (ref 10–50)
LYMPHOCYTES NFR BLD MANUAL: 3 % (ref 0–12)
LYMPHOCYTES NFR BLD MANUAL: 6 % (ref 10–50)
LYMPHOCYTES NFR BLD MANUAL: 8 % (ref 0–12)
Lab: NORMAL
Lab: NORMAL
MAGNESIUM SERPL-MCNC: 1.4 MG/DL (ref 1.6–2.3)
MAGNESIUM SERPL-MCNC: 1.4 MG/DL (ref 1.6–2.3)
MAGNESIUM SERPL-MCNC: 1.5 MG/DL (ref 1.6–2.3)
MAGNESIUM SERPL-MCNC: 1.5 MG/DL (ref 1.6–2.3)
MAGNESIUM SERPL-MCNC: 1.6 MG/DL (ref 1.6–2.3)
MAGNESIUM SERPL-MCNC: 1.7 MG/DL (ref 1.6–2.3)
MAGNESIUM SERPL-MCNC: 1.8 MG/DL (ref 1.6–2.3)
MAGNESIUM SERPL-MCNC: 1.9 MG/DL (ref 1.6–2.3)
MAGNESIUM SERPL-MCNC: 2 MG/DL (ref 1.6–2.3)
MCH RBC QN AUTO: 28.6 PG (ref 26.5–34)
MCH RBC QN AUTO: 28.8 PG (ref 26.5–34)
MCH RBC QN AUTO: 29 PG (ref 26.5–34)
MCH RBC QN AUTO: 29.1 PG (ref 26.5–34)
MCH RBC QN AUTO: 29.2 PG (ref 26.5–34)
MCH RBC QN AUTO: 29.3 PG (ref 26.5–34)
MCH RBC QN AUTO: 29.3 PG (ref 26.5–34)
MCH RBC QN AUTO: 29.4 PG (ref 26.5–34)
MCH RBC QN AUTO: 29.4 PG (ref 26.5–34)
MCH RBC QN AUTO: 29.5 PG (ref 26.5–34)
MCH RBC QN AUTO: 29.5 PG (ref 26.5–34)
MCH RBC QN AUTO: 29.6 PG (ref 26.5–34)
MCH RBC QN AUTO: 29.6 PG (ref 26.5–34)
MCH RBC QN AUTO: 29.7 PG (ref 26.5–34)
MCH RBC QN AUTO: 29.9 PG (ref 26.5–34)
MCH RBC QN AUTO: 30 PG (ref 26.5–34)
MCH RBC QN AUTO: 30 PG (ref 26.5–34)
MCH RBC QN AUTO: 30.1 PG (ref 26.5–34)
MCH RBC QN AUTO: 30.1 PG (ref 26.5–34)
MCH RBC QN AUTO: 30.2 PG (ref 26.5–34)
MCH RBC QN AUTO: 30.4 PG (ref 26.5–34)
MCH RBC QN AUTO: 30.5 PG (ref 26.5–34)
MCH RBC QN AUTO: 30.6 PG (ref 26.5–34)
MCH RBC QN AUTO: 30.7 PG (ref 26.5–34)
MCHC RBC AUTO-ENTMCNC: 32.9 G/DL (ref 31.4–36)
MCHC RBC AUTO-ENTMCNC: 33.1 G/DL (ref 31.4–36)
MCHC RBC AUTO-ENTMCNC: 33.1 G/DL (ref 31.4–36)
MCHC RBC AUTO-ENTMCNC: 33.2 G/DL (ref 31.4–36)
MCHC RBC AUTO-ENTMCNC: 33.2 G/DL (ref 31.4–36)
MCHC RBC AUTO-ENTMCNC: 33.3 G/DL (ref 31.4–36)
MCHC RBC AUTO-ENTMCNC: 33.4 G/DL (ref 31.4–36)
MCHC RBC AUTO-ENTMCNC: 33.7 G/DL (ref 31.4–36)
MCHC RBC AUTO-ENTMCNC: 33.8 G/DL (ref 31.4–36)
MCHC RBC AUTO-ENTMCNC: 34 G/DL (ref 31.4–36)
MCHC RBC AUTO-ENTMCNC: 34 G/DL (ref 31.4–36)
MCHC RBC AUTO-ENTMCNC: 34.2 G/DL (ref 31.4–36)
MCHC RBC AUTO-ENTMCNC: 34.3 G/DL (ref 31.4–36)
MCHC RBC AUTO-ENTMCNC: 34.3 G/DL (ref 31.4–36)
MCHC RBC AUTO-ENTMCNC: 34.4 G/DL (ref 31.4–36)
MCHC RBC AUTO-ENTMCNC: 34.4 G/DL (ref 31.4–36)
MCHC RBC AUTO-ENTMCNC: 34.6 G/DL (ref 31.4–36)
MCHC RBC AUTO-ENTMCNC: 34.6 G/DL (ref 31.4–36)
MCHC RBC AUTO-ENTMCNC: 34.7 G/DL (ref 31.4–36)
MCHC RBC AUTO-ENTMCNC: 34.8 G/DL (ref 31.4–36)
MCHC RBC AUTO-ENTMCNC: 35.1 G/DL (ref 31.4–36)
MCHC RBC AUTO-ENTMCNC: 35.3 G/DL (ref 31.4–36)
MCV RBC AUTO: 84.1 FL (ref 80–98)
MCV RBC AUTO: 86.3 FL (ref 80–98)
MCV RBC AUTO: 86.3 FL (ref 80–98)
MCV RBC AUTO: 86.4 FL (ref 80–98)
MCV RBC AUTO: 86.4 FL (ref 80–98)
MCV RBC AUTO: 86.6 FL (ref 80–98)
MCV RBC AUTO: 87 FL (ref 80–98)
MCV RBC AUTO: 87 FL (ref 80–98)
MCV RBC AUTO: 87.1 FL (ref 80–98)
MCV RBC AUTO: 87.1 FL (ref 80–98)
MCV RBC AUTO: 87.2 FL (ref 80–98)
MCV RBC AUTO: 87.5 FL (ref 80–98)
MCV RBC AUTO: 87.7 FL (ref 80–98)
MCV RBC AUTO: 87.8 FL (ref 80–98)
MCV RBC AUTO: 88 FL (ref 80–98)
MCV RBC AUTO: 88.1 FL (ref 80–98)
MCV RBC AUTO: 88.2 FL (ref 80–98)
MCV RBC AUTO: 88.3 FL (ref 80–98)
MCV RBC AUTO: 88.3 FL (ref 80–98)
MCV RBC AUTO: 88.4 FL (ref 80–98)
METHADONE UR QL SCN: NEGATIVE
MODALITY: ABNORMAL
MODALITY: ABNORMAL
MONOCYTES # BLD AUTO: 0.02 10*3/MM3 (ref 0–0.9)
MONOCYTES # BLD AUTO: 0.05 10*3/MM3 (ref 0–0.9)
MONOCYTES # BLD AUTO: 0.05 10*3/MM3 (ref 0–0.9)
MONOCYTES # BLD AUTO: 0.06 10*3/MM3 (ref 0–0.9)
MONOCYTES # BLD AUTO: 0.08 10*3/MM3 (ref 0–0.9)
MONOCYTES # BLD AUTO: 0.12 10*3/MM3 (ref 0–0.9)
MONOCYTES # BLD AUTO: 0.18 10*3/MM3 (ref 0–0.9)
MONOCYTES # BLD AUTO: 0.2 10*3/MM3 (ref 0–0.9)
MONOCYTES # BLD AUTO: 0.2 10*3/MM3 (ref 0–0.9)
MONOCYTES # BLD AUTO: 0.21 10*3/MM3 (ref 0–0.9)
MONOCYTES # BLD AUTO: 0.26 10*3/MM3 (ref 0–0.9)
MONOCYTES # BLD AUTO: 0.28 10*3/MM3 (ref 0–0.9)
MONOCYTES # BLD AUTO: 0.29 10*3/MM3 (ref 0–0.9)
MONOCYTES # BLD AUTO: 0.31 10*3/MM3 (ref 0–0.9)
MONOCYTES # BLD AUTO: 0.31 10*3/MM3 (ref 0–0.9)
MONOCYTES # BLD AUTO: 0.34 10*3/MM3 (ref 0–0.9)
MONOCYTES # BLD AUTO: 0.42 10*3/MM3 (ref 0–0.9)
MONOCYTES # BLD AUTO: 0.44 10*3/MM3 (ref 0–0.9)
MONOCYTES # BLD AUTO: 0.49 10*3/MM3 (ref 0–0.9)
MONOCYTES # BLD AUTO: 0.75 10*3/MM3 (ref 0–0.9)
MONOCYTES NFR BLD AUTO: 0.3 % (ref 0–12)
MONOCYTES NFR BLD AUTO: 0.4 % (ref 0–12)
MONOCYTES NFR BLD AUTO: 0.5 % (ref 0–12)
MONOCYTES NFR BLD AUTO: 1 % (ref 0–12)
MONOCYTES NFR BLD AUTO: 1.1 % (ref 0–12)
MONOCYTES NFR BLD AUTO: 1.1 % (ref 0–12)
MONOCYTES NFR BLD AUTO: 1.4 % (ref 0–12)
MONOCYTES NFR BLD AUTO: 1.4 % (ref 0–12)
MONOCYTES NFR BLD AUTO: 14.6 % (ref 0–12)
MONOCYTES NFR BLD AUTO: 2 % (ref 0–12)
MONOCYTES NFR BLD AUTO: 2.4 % (ref 0–12)
MONOCYTES NFR BLD AUTO: 2.8 % (ref 0–12)
MONOCYTES NFR BLD AUTO: 3 % (ref 0–12)
MONOCYTES NFR BLD AUTO: 3.5 % (ref 0–12)
MONOCYTES NFR BLD AUTO: 4.3 % (ref 0–12)
MONOCYTES NFR BLD AUTO: 4.6 % (ref 0–12)
MONOCYTES NFR BLD AUTO: 4.6 % (ref 0–12)
MONOCYTES NFR BLD AUTO: 5.1 % (ref 0–12)
MONOCYTES NFR BLD AUTO: 5.4 % (ref 0–12)
MONOCYTES NFR BLD AUTO: 5.5 % (ref 0–12)
MYELOCYTES NFR BLD MANUAL: 2 % (ref 0–0)
NEUTROPHILS # BLD AUTO: 10.58 10*3/MM3 (ref 2–8.6)
NEUTROPHILS # BLD AUTO: 12.5 10*3/MM3 (ref 2–8.6)
NEUTROPHILS # BLD AUTO: 3.52 10*3/MM3 (ref 2–8.6)
NEUTROPHILS # BLD AUTO: 3.9 10*3/MM3 (ref 2–8.6)
NEUTROPHILS # BLD AUTO: 4.17 10*3/MM3 (ref 2–8.6)
NEUTROPHILS # BLD AUTO: 4.81 10*3/MM3 (ref 2–8.6)
NEUTROPHILS # BLD AUTO: 4.82 10*3/MM3 (ref 2–8.6)
NEUTROPHILS # BLD AUTO: 4.91 10*3/MM3 (ref 2–8.6)
NEUTROPHILS # BLD AUTO: 4.97 10*3/MM3 (ref 2–8.6)
NEUTROPHILS # BLD AUTO: 5.04 10*3/MM3 (ref 2–8.6)
NEUTROPHILS # BLD AUTO: 5.23 10*3/MM3 (ref 2–8.6)
NEUTROPHILS # BLD AUTO: 5.25 10*3/MM3 (ref 2–8.6)
NEUTROPHILS # BLD AUTO: 5.31 10*3/MM3 (ref 2–8.6)
NEUTROPHILS # BLD AUTO: 5.31 10*3/MM3 (ref 2–8.6)
NEUTROPHILS # BLD AUTO: 5.35 10*3/MM3 (ref 2–8.6)
NEUTROPHILS # BLD AUTO: 5.37 10*3/MM3 (ref 2–8.6)
NEUTROPHILS # BLD AUTO: 5.42 10*3/MM3 (ref 2–8.6)
NEUTROPHILS # BLD AUTO: 5.55 10*3/MM3 (ref 2–8.6)
NEUTROPHILS # BLD AUTO: 5.58 10*3/MM3 (ref 2–8.6)
NEUTROPHILS # BLD AUTO: 7.98 10*3/MM3 (ref 2–8.6)
NEUTROPHILS # BLD AUTO: 8.26 10*3/MM3 (ref 2–8.6)
NEUTROPHILS # BLD AUTO: 8.37 10*3/MM3 (ref 2–8.6)
NEUTROPHILS NFR BLD AUTO: 75.7 % (ref 37–80)
NEUTROPHILS NFR BLD AUTO: 84.6 % (ref 37–80)
NEUTROPHILS NFR BLD AUTO: 85.7 % (ref 37–80)
NEUTROPHILS NFR BLD AUTO: 86.1 % (ref 37–80)
NEUTROPHILS NFR BLD AUTO: 86.5 % (ref 37–80)
NEUTROPHILS NFR BLD AUTO: 87.4 % (ref 37–80)
NEUTROPHILS NFR BLD AUTO: 87.4 % (ref 37–80)
NEUTROPHILS NFR BLD AUTO: 87.9 % (ref 37–80)
NEUTROPHILS NFR BLD AUTO: 88.6 % (ref 37–80)
NEUTROPHILS NFR BLD AUTO: 88.7 % (ref 37–80)
NEUTROPHILS NFR BLD AUTO: 89.7 % (ref 37–80)
NEUTROPHILS NFR BLD AUTO: 90.2 % (ref 37–80)
NEUTROPHILS NFR BLD AUTO: 90.5 % (ref 37–80)
NEUTROPHILS NFR BLD AUTO: 90.5 % (ref 37–80)
NEUTROPHILS NFR BLD AUTO: 90.6 % (ref 37–80)
NEUTROPHILS NFR BLD AUTO: 92.2 % (ref 37–80)
NEUTROPHILS NFR BLD AUTO: 92.5 % (ref 37–80)
NEUTROPHILS NFR BLD AUTO: 93.1 % (ref 37–80)
NEUTROPHILS NFR BLD AUTO: 93.2 % (ref 37–80)
NEUTROPHILS NFR BLD AUTO: 94.4 % (ref 37–80)
NEUTROPHILS NFR BLD MANUAL: 67 % (ref 37–80)
NEUTROPHILS NFR BLD MANUAL: 75 % (ref 37–80)
NEUTS BAND NFR BLD MANUAL: 16 % (ref 0–5)
NEUTS VAC BLD QL SMEAR: ABNORMAL
NEUTS VAC BLD QL SMEAR: NORMAL
NITRITE UR QL STRIP: NEGATIVE
NITRITE UR QL STRIP: NEGATIVE
NRBC BLD MANUAL-RTO: 0 /100 WBC (ref 0–0)
NRBC BLD MANUAL-RTO: 0.3 /100 WBC (ref 0–0)
NRBC BLD MANUAL-RTO: 0.4 /100 WBC (ref 0–0)
OPIATES UR QL: POSITIVE
OSMOLALITY UR: 621 MOSM/KG (ref 38–1400)
OXYCODONE UR QL SCN: NEGATIVE
PCO2 BLDA: 25.7 MM HG (ref 35–45)
PCO2 BLDA: 34.2 MM HG (ref 35–45)
PH BLDA: 7.5 PH UNITS (ref 7.35–7.45)
PH BLDA: 7.54 PH UNITS (ref 7.35–7.45)
PH UR STRIP.AUTO: 6 [PH] (ref 5–9)
PH UR STRIP.AUTO: 6.5 [PH] (ref 5–9)
PHENOTYPE: ABNORMAL
PHOSPHATE SERPL-MCNC: 3.6 MG/DL (ref 2.4–4.4)
PLATELET # BLD AUTO: 17 10*3/MM3 (ref 150–450)
PLATELET # BLD AUTO: 27 10*3/MM3 (ref 150–450)
PLATELET # BLD AUTO: 29 10*3/MM3 (ref 150–450)
PLATELET # BLD AUTO: 30 10*3/MM3 (ref 150–450)
PLATELET # BLD AUTO: 31 10*3/MM3 (ref 150–450)
PLATELET # BLD AUTO: 32 10*3/MM3 (ref 150–450)
PLATELET # BLD AUTO: 33 10*3/MM3 (ref 150–450)
PLATELET # BLD AUTO: 33 10*3/MM3 (ref 150–450)
PLATELET # BLD AUTO: 34 10*3/MM3 (ref 150–450)
PLATELET # BLD AUTO: 34 10*3/MM3 (ref 150–450)
PLATELET # BLD AUTO: 35 10*3/MM3 (ref 150–450)
PLATELET # BLD AUTO: 37 10*3/MM3 (ref 150–450)
PLATELET # BLD AUTO: 40 10*3/MM3 (ref 150–450)
PLATELET # BLD AUTO: 41 10*3/MM3 (ref 150–450)
PLATELET # BLD AUTO: 42 10*3/MM3 (ref 150–450)
PLATELET # BLD AUTO: 45 10*3/MM3 (ref 150–450)
PLATELET # BLD AUTO: 46 10*3/MM3 (ref 150–450)
PLATELET # BLD AUTO: 48 10*3/MM3 (ref 150–450)
PLATELET # BLD AUTO: 50 10*3/MM3 (ref 150–450)
PLATELET # BLD AUTO: 55 10*3/MM3 (ref 150–450)
PLATELET # BLD AUTO: 57 10*3/MM3 (ref 150–450)
PLATELET # BLD AUTO: 87 10*3/MM3 (ref 150–450)
PMV BLD AUTO: 10.5 FL (ref 8–12)
PMV BLD AUTO: 10.5 FL (ref 8–12)
PMV BLD AUTO: 10.6 FL (ref 8–12)
PMV BLD AUTO: 10.7 FL (ref 8–12)
PMV BLD AUTO: 10.8 FL (ref 8–12)
PMV BLD AUTO: 10.9 FL (ref 8–12)
PMV BLD AUTO: 10.9 FL (ref 8–12)
PMV BLD AUTO: 11.1 FL (ref 8–12)
PMV BLD AUTO: 11.3 FL (ref 8–12)
PMV BLD AUTO: 11.4 FL (ref 8–12)
PMV BLD AUTO: 11.4 FL (ref 8–12)
PMV BLD AUTO: 11.6 FL (ref 8–12)
PMV BLD AUTO: 11.7 FL (ref 8–12)
PMV BLD AUTO: 11.7 FL (ref 8–12)
PMV BLD AUTO: 12.1 FL (ref 8–12)
PMV BLD AUTO: 12.1 FL (ref 8–12)
PMV BLD AUTO: 12.3 FL (ref 8–12)
PMV BLD AUTO: 12.3 FL (ref 8–12)
PMV BLD AUTO: 12.4 FL (ref 8–12)
PMV BLD AUTO: 12.6 FL (ref 8–12)
PMV BLD AUTO: 12.7 FL (ref 8–12)
PMV BLD AUTO: 12.8 FL (ref 8–12)
PMV BLD AUTO: 13.1 FL (ref 8–12)
PMV BLD AUTO: 13.1 FL (ref 8–12)
PMV BLD AUTO: 13.3 FL (ref 8–12)
PMV BLD AUTO: ABNORMAL FL (ref 8–12)
PO2 BLDA: 352.1 MM HG (ref 80–105)
PO2 BLDA: 99.5 MM HG (ref 80–105)
POIKILOCYTOSIS BLD QL SMEAR: NORMAL
POIKILOCYTOSIS BLD QL SMEAR: NORMAL
POLYCHROMASIA BLD QL SMEAR: NORMAL
POTASSIUM BLD-SCNC: 2.7 MMOL/L (ref 3.5–5.1)
POTASSIUM BLD-SCNC: 3 MMOL/L (ref 3.5–5.1)
POTASSIUM BLD-SCNC: 3 MMOL/L (ref 3.5–5.1)
POTASSIUM BLD-SCNC: 3.1 MMOL/L (ref 3.5–5.1)
POTASSIUM BLD-SCNC: 3.2 MMOL/L (ref 3.5–5.1)
POTASSIUM BLD-SCNC: 3.4 MMOL/L (ref 3.5–5.1)
POTASSIUM BLD-SCNC: 3.5 MMOL/L (ref 3.5–5.1)
POTASSIUM BLD-SCNC: 3.6 MMOL/L (ref 3.5–5.1)
POTASSIUM BLD-SCNC: 3.7 MMOL/L (ref 3.5–5.1)
POTASSIUM BLD-SCNC: 3.7 MMOL/L (ref 3.5–5.1)
POTASSIUM BLD-SCNC: 4 MMOL/L (ref 3.5–5.1)
POTASSIUM BLD-SCNC: 4 MMOL/L (ref 3.5–5.1)
POTASSIUM BLD-SCNC: 4.1 MMOL/L (ref 3.5–5.1)
POTASSIUM BLD-SCNC: 4.3 MMOL/L (ref 3.5–5.1)
POTASSIUM BLDA-SCNC: 2.74 MMOL/L (ref 3.6–4.9)
POTASSIUM BLDA-SCNC: 4.39 MMOL/L (ref 3.6–4.9)
PREALB SERPL-MCNC: 5.6 MG/DL (ref 17.6–36)
PROT SERPL-MCNC: 4.1 G/DL (ref 6.3–8.6)
PROT SERPL-MCNC: 4.2 G/DL (ref 6.3–8.6)
PROT SERPL-MCNC: 4.2 G/DL (ref 6.3–8.6)
PROT SERPL-MCNC: 4.3 G/DL (ref 6.3–8.6)
PROT SERPL-MCNC: 4.4 G/DL (ref 6.3–8.6)
PROT SERPL-MCNC: 4.5 G/DL (ref 6.3–8.6)
PROT SERPL-MCNC: 4.5 G/DL (ref 6.3–8.6)
PROT SERPL-MCNC: 4.6 G/DL (ref 6.3–8.6)
PROT SERPL-MCNC: 4.7 G/DL (ref 6.3–8.6)
PROT SERPL-MCNC: 4.8 G/DL (ref 6.3–8.6)
PROT SERPL-MCNC: 5.3 G/DL (ref 6.3–8.6)
PROT SERPL-MCNC: 6 G/DL (ref 6.3–8.6)
PROT UR QL STRIP: ABNORMAL
PROT UR QL STRIP: NEGATIVE
PROTHROMBIN TIME: 12.3 SECONDS (ref 11.1–15.3)
RBC # BLD AUTO: 2.01 10*6/MM3 (ref 3.77–5.16)
RBC # BLD AUTO: 2.24 10*6/MM3 (ref 3.77–5.16)
RBC # BLD AUTO: 2.3 10*6/MM3 (ref 3.77–5.16)
RBC # BLD AUTO: 2.48 10*6/MM3 (ref 3.77–5.16)
RBC # BLD AUTO: 2.78 10*6/MM3 (ref 3.77–5.16)
RBC # BLD AUTO: 2.84 10*6/MM3 (ref 3.77–5.16)
RBC # BLD AUTO: 2.88 10*6/MM3 (ref 3.77–5.16)
RBC # BLD AUTO: 2.91 10*6/MM3 (ref 3.77–5.16)
RBC # BLD AUTO: 2.95 10*6/MM3 (ref 3.77–5.16)
RBC # BLD AUTO: 2.95 10*6/MM3 (ref 3.77–5.16)
RBC # BLD AUTO: 2.96 10*6/MM3 (ref 3.77–5.16)
RBC # BLD AUTO: 2.96 10*6/MM3 (ref 3.77–5.16)
RBC # BLD AUTO: 3 10*6/MM3 (ref 3.77–5.16)
RBC # BLD AUTO: 3 10*6/MM3 (ref 3.77–5.16)
RBC # BLD AUTO: 3.05 10*6/MM3 (ref 3.77–5.16)
RBC # BLD AUTO: 3.05 10*6/MM3 (ref 3.77–5.16)
RBC # BLD AUTO: 3.06 10*6/MM3 (ref 3.77–5.16)
RBC # BLD AUTO: 3.08 10*6/MM3 (ref 3.77–5.16)
RBC # BLD AUTO: 3.18 10*6/MM3 (ref 3.77–5.16)
RBC # BLD AUTO: 3.2 10*6/MM3 (ref 3.77–5.16)
RBC # BLD AUTO: 3.21 10*6/MM3 (ref 3.77–5.16)
RBC # BLD AUTO: 3.26 10*6/MM3 (ref 3.77–5.16)
RBC # BLD AUTO: 3.28 10*6/MM3 (ref 3.77–5.16)
RBC # BLD AUTO: 3.45 10*6/MM3 (ref 3.77–5.16)
RBC # BLD AUTO: 3.45 10*6/MM3 (ref 3.77–5.16)
RBC # BLD AUTO: 3.48 10*6/MM3 (ref 3.77–5.16)
RBC # UR: ABNORMAL /HPF
RBC # UR: ABNORMAL /HPF
REF LAB TEST METHOD: ABNORMAL
REF LAB TEST METHOD: ABNORMAL
RH BLD: POSITIVE
RH BLD: POSITIVE
SAO2 % BLDCOA: 97.3 % (ref 94–100)
SAO2 % BLDCOA: 99.8 % (ref 94–100)
SMALL PLATELETS BLD QL SMEAR: ABNORMAL
SMALL PLATELETS BLD QL SMEAR: ABNORMAL
SMALL PLATELETS BLD QL SMEAR: NORMAL
SODIUM BLD-SCNC: 119 MMOL/L (ref 137–145)
SODIUM BLD-SCNC: 119 MMOL/L (ref 137–145)
SODIUM BLD-SCNC: 120 MMOL/L (ref 137–145)
SODIUM BLD-SCNC: 120 MMOL/L (ref 137–145)
SODIUM BLD-SCNC: 125 MMOL/L (ref 137–145)
SODIUM BLD-SCNC: 125 MMOL/L (ref 137–145)
SODIUM BLD-SCNC: 126 MMOL/L (ref 137–145)
SODIUM BLD-SCNC: 127 MMOL/L (ref 137–145)
SODIUM BLD-SCNC: 127 MMOL/L (ref 137–145)
SODIUM BLD-SCNC: 129 MMOL/L (ref 137–145)
SODIUM BLD-SCNC: 131 MMOL/L (ref 137–145)
SODIUM BLD-SCNC: 132 MMOL/L (ref 137–145)
SODIUM BLD-SCNC: 133 MMOL/L (ref 137–145)
SODIUM BLD-SCNC: 134 MMOL/L (ref 137–145)
SODIUM BLD-SCNC: 139 MMOL/L (ref 137–145)
SODIUM BLDA-SCNC: 115.7 MMOL/L (ref 138–146)
SODIUM BLDA-SCNC: 137.3 MMOL/L (ref 138–146)
SODIUM UR-SCNC: 6 MMOL/L (ref 30–90)
SP GR UR STRIP: 1.01 (ref 1–1.03)
SP GR UR STRIP: 1.02 (ref 1–1.03)
SQUAMOUS #/AREA URNS HPF: ABNORMAL /HPF
SQUAMOUS #/AREA URNS HPF: ABNORMAL /HPF
TARGETS BLD QL SMEAR: ABNORMAL
TARGETS BLD QL SMEAR: NORMAL
TIBC SERPL-MCNC: 141 MCG/DL (ref 265–497)
TOXIC GRANULATION: ABNORMAL
TOXIC GRANULATION: NORMAL
TRIGL SERPL-MCNC: 71 MG/DL (ref 20–199)
TSH SERPL DL<=0.05 MIU/L-ACNC: 3.23 MIU/ML (ref 0.46–4.68)
UNIT  ABO: NORMAL
UNIT  ABO: NORMAL
UNIT  RH: NORMAL
UNIT  RH: NORMAL
URATE SERPL-MCNC: 6.8 MG/DL (ref 2.5–8.5)
UROBILINOGEN UR QL STRIP: ABNORMAL
UROBILINOGEN UR QL STRIP: ABNORMAL
WBC MORPH BLD: NORMAL
WBC NRBC COR # BLD: 10.68 10*3/MM3 (ref 3.2–9.8)
WBC NRBC COR # BLD: 14.11 10*3/MM3 (ref 3.2–9.8)
WBC NRBC COR # BLD: 3.82 10*3/MM3 (ref 3.2–9.8)
WBC NRBC COR # BLD: 4.6 10*3/MM3 (ref 3.2–9.8)
WBC NRBC COR # BLD: 4.69 10*3/MM3 (ref 3.2–9.8)
WBC NRBC COR # BLD: 5.15 10*3/MM3 (ref 3.2–9.8)
WBC NRBC COR # BLD: 5.16 10*3/MM3 (ref 3.2–9.8)
WBC NRBC COR # BLD: 5.18 10*3/MM3 (ref 3.2–9.8)
WBC NRBC COR # BLD: 5.67 10*3/MM3 (ref 3.2–9.8)
WBC NRBC COR # BLD: 5.67 10*3/MM3 (ref 3.2–9.8)
WBC NRBC COR # BLD: 5.77 10*3/MM3 (ref 3.2–9.8)
WBC NRBC COR # BLD: 5.87 10*3/MM3 (ref 3.2–9.8)
WBC NRBC COR # BLD: 5.89 10*3/MM3 (ref 3.2–9.8)
WBC NRBC COR # BLD: 5.94 10*3/MM3 (ref 3.2–9.8)
WBC NRBC COR # BLD: 5.98 10*3/MM3 (ref 3.2–9.8)
WBC NRBC COR # BLD: 6.07 10*3/MM3 (ref 3.2–9.8)
WBC NRBC COR # BLD: 6.09 10*3/MM3 (ref 3.2–9.8)
WBC NRBC COR # BLD: 6.11 10*3/MM3 (ref 3.2–9.8)
WBC NRBC COR # BLD: 6.29 10*3/MM3 (ref 3.2–9.8)
WBC NRBC COR # BLD: 6.32 10*3/MM3 (ref 3.2–9.8)
WBC NRBC COR # BLD: 6.55 10*3/MM3 (ref 3.2–9.8)
WBC NRBC COR # BLD: 6.85 10*3/MM3 (ref 3.2–9.8)
WBC NRBC COR # BLD: 8.45 10*3/MM3 (ref 3.2–9.8)
WBC NRBC COR # BLD: 9.21 10*3/MM3 (ref 3.2–9.8)
WBC NRBC COR # BLD: 9.52 10*3/MM3 (ref 3.2–9.8)
WBC NRBC COR # BLD: 9.65 10*3/MM3 (ref 3.2–9.8)
WBC UR QL AUTO: ABNORMAL /HPF
WBC UR QL AUTO: ABNORMAL /HPF
WHOLE BLOOD HOLD SPECIMEN: NORMAL

## 2017-01-01 PROCEDURE — 84132 ASSAY OF SERUM POTASSIUM: CPT | Performed by: FAMILY MEDICINE

## 2017-01-01 PROCEDURE — 80307 DRUG TEST PRSMV CHEM ANLYZR: CPT | Performed by: EMERGENCY MEDICINE

## 2017-01-01 PROCEDURE — 25010000002 LORAZEPAM PER 2 MG: Performed by: INTERNAL MEDICINE

## 2017-01-01 PROCEDURE — P9016 RBC LEUKOCYTES REDUCED: HCPCS

## 2017-01-01 PROCEDURE — 83735 ASSAY OF MAGNESIUM: CPT | Performed by: INTERNAL MEDICINE

## 2017-01-01 PROCEDURE — 85025 COMPLETE CBC W/AUTO DIFF WBC: CPT | Performed by: INTERNAL MEDICINE

## 2017-01-01 PROCEDURE — 84550 ASSAY OF BLOOD/URIC ACID: CPT | Performed by: INTERNAL MEDICINE

## 2017-01-01 PROCEDURE — 0W9G3ZZ DRAINAGE OF PERITONEAL CAVITY, PERCUTANEOUS APPROACH: ICD-10-PCS | Performed by: RADIOLOGY

## 2017-01-01 PROCEDURE — 25010000002 OCTREOTIDE PER 25 MCG: Performed by: INTERNAL MEDICINE

## 2017-01-01 PROCEDURE — 25010000002 FUROSEMIDE PER 20 MG: Performed by: INTERNAL MEDICINE

## 2017-01-01 PROCEDURE — 80053 COMPREHEN METABOLIC PANEL: CPT | Performed by: INTERNAL MEDICINE

## 2017-01-01 PROCEDURE — 85007 BL SMEAR W/DIFF WBC COUNT: CPT | Performed by: FAMILY MEDICINE

## 2017-01-01 PROCEDURE — P9041 ALBUMIN (HUMAN),5%, 50ML: HCPCS | Performed by: INTERNAL MEDICINE

## 2017-01-01 PROCEDURE — 25010000002 MAGNESIUM SULFATE PER 500 MG OF MAGNESIUM: Performed by: INTERNAL MEDICINE

## 2017-01-01 PROCEDURE — P9046 ALBUMIN (HUMAN), 25%, 20 ML: HCPCS | Performed by: INTERNAL MEDICINE

## 2017-01-01 PROCEDURE — 81003 URINALYSIS AUTO W/O SCOPE: CPT | Performed by: EMERGENCY MEDICINE

## 2017-01-01 PROCEDURE — 82248 BILIRUBIN DIRECT: CPT | Performed by: INTERNAL MEDICINE

## 2017-01-01 PROCEDURE — 83516 IMMUNOASSAY NONANTIBODY: CPT | Performed by: INTERNAL MEDICINE

## 2017-01-01 PROCEDURE — 25010000002 MORPHINE SULFATE (PF) 2 MG/ML SOLUTION: Performed by: INTERNAL MEDICINE

## 2017-01-01 PROCEDURE — 25010000003 POTASSIUM CHLORIDE 10 MEQ/100ML SOLUTION: Performed by: INTERNAL MEDICINE

## 2017-01-01 PROCEDURE — 71010 HC CHEST PA OR AP: CPT

## 2017-01-01 PROCEDURE — 85025 COMPLETE CBC W/AUTO DIFF WBC: CPT | Performed by: EMERGENCY MEDICINE

## 2017-01-01 PROCEDURE — 84100 ASSAY OF PHOSPHORUS: CPT | Performed by: FAMILY MEDICINE

## 2017-01-01 PROCEDURE — 82390 ASSAY OF CERULOPLASMIN: CPT | Performed by: INTERNAL MEDICINE

## 2017-01-01 PROCEDURE — P9019 PLATELETS, EACH UNIT: HCPCS

## 2017-01-01 PROCEDURE — 82140 ASSAY OF AMMONIA: CPT | Performed by: FAMILY MEDICINE

## 2017-01-01 PROCEDURE — 86900 BLOOD TYPING SEROLOGIC ABO: CPT | Performed by: EMERGENCY MEDICINE

## 2017-01-01 PROCEDURE — 36430 TRANSFUSION BLD/BLD COMPNT: CPT

## 2017-01-01 PROCEDURE — 85007 BL SMEAR W/DIFF WBC COUNT: CPT | Performed by: INTERNAL MEDICINE

## 2017-01-01 PROCEDURE — 87186 SC STD MICRODIL/AGAR DIL: CPT | Performed by: FAMILY MEDICINE

## 2017-01-01 PROCEDURE — 25010000002 ALBUMIN HUMAN 25% PER 50 ML: Performed by: INTERNAL MEDICINE

## 2017-01-01 PROCEDURE — 86901 BLOOD TYPING SEROLOGIC RH(D): CPT | Performed by: FAMILY MEDICINE

## 2017-01-01 PROCEDURE — 86923 COMPATIBILITY TEST ELECTRIC: CPT

## 2017-01-01 PROCEDURE — 84295 ASSAY OF SERUM SODIUM: CPT | Performed by: INTERNAL MEDICINE

## 2017-01-01 PROCEDURE — 85027 COMPLETE CBC AUTOMATED: CPT | Performed by: FAMILY MEDICINE

## 2017-01-01 PROCEDURE — 93005 ELECTROCARDIOGRAM TRACING: CPT | Performed by: EMERGENCY MEDICINE

## 2017-01-01 PROCEDURE — 85027 COMPLETE CBC AUTOMATED: CPT | Performed by: INTERNAL MEDICINE

## 2017-01-01 PROCEDURE — 25010000002 ALBUMIN HUMAN 5% PER 50 ML: Performed by: INTERNAL MEDICINE

## 2017-01-01 PROCEDURE — 25010000002 MORPHINE PER 10 MG: Performed by: INTERNAL MEDICINE

## 2017-01-01 PROCEDURE — 97110 THERAPEUTIC EXERCISES: CPT

## 2017-01-01 PROCEDURE — 84443 ASSAY THYROID STIM HORMONE: CPT | Performed by: INTERNAL MEDICINE

## 2017-01-01 PROCEDURE — 94799 UNLISTED PULMONARY SVC/PX: CPT

## 2017-01-01 PROCEDURE — 82103 ALPHA-1-ANTITRYPSIN TOTAL: CPT | Performed by: INTERNAL MEDICINE

## 2017-01-01 PROCEDURE — G8979 MOBILITY GOAL STATUS: HCPCS

## 2017-01-01 PROCEDURE — 84132 ASSAY OF SERUM POTASSIUM: CPT | Performed by: INTERNAL MEDICINE

## 2017-01-01 PROCEDURE — 86850 RBC ANTIBODY SCREEN: CPT | Performed by: FAMILY MEDICINE

## 2017-01-01 PROCEDURE — 85025 COMPLETE CBC W/AUTO DIFF WBC: CPT | Performed by: FAMILY MEDICINE

## 2017-01-01 PROCEDURE — 25010000002 MAGNESIUM SULFATE IN D5W 1G/100ML (PREMIX) 1-5 GM/100ML-% SOLUTION 100 ML FLEX CONT: Performed by: INTERNAL MEDICINE

## 2017-01-01 PROCEDURE — 87086 URINE CULTURE/COLONY COUNT: CPT | Performed by: FAMILY MEDICINE

## 2017-01-01 PROCEDURE — 93010 ELECTROCARDIOGRAM REPORT: CPT | Performed by: INTERNAL MEDICINE

## 2017-01-01 PROCEDURE — 99285 EMERGENCY DEPT VISIT HI MDM: CPT

## 2017-01-01 PROCEDURE — G8988 SELF CARE GOAL STATUS: HCPCS

## 2017-01-01 PROCEDURE — 25010000002 HEPARIN (PORCINE) PER 1000 UNITS: Performed by: INTERNAL MEDICINE

## 2017-01-01 PROCEDURE — 84134 ASSAY OF PREALBUMIN: CPT | Performed by: FAMILY MEDICINE

## 2017-01-01 PROCEDURE — 94760 N-INVAS EAR/PLS OXIMETRY 1: CPT

## 2017-01-01 PROCEDURE — 25010000002 ONDANSETRON PER 1 MG: Performed by: INTERNAL MEDICINE

## 2017-01-01 PROCEDURE — 82962 GLUCOSE BLOOD TEST: CPT

## 2017-01-01 PROCEDURE — 82105 ALPHA-FETOPROTEIN SERUM: CPT | Performed by: INTERNAL MEDICINE

## 2017-01-01 PROCEDURE — 85730 THROMBOPLASTIN TIME PARTIAL: CPT | Performed by: EMERGENCY MEDICINE

## 2017-01-01 PROCEDURE — 82306 VITAMIN D 25 HYDROXY: CPT | Performed by: INTERNAL MEDICINE

## 2017-01-01 PROCEDURE — 87150 DNA/RNA AMPLIFIED PROBE: CPT | Performed by: FAMILY MEDICINE

## 2017-01-01 PROCEDURE — 99205 OFFICE O/P NEW HI 60 MIN: CPT | Performed by: NURSE PRACTITIONER

## 2017-01-01 PROCEDURE — 93976 VASCULAR STUDY: CPT

## 2017-01-01 PROCEDURE — 83935 ASSAY OF URINE OSMOLALITY: CPT | Performed by: INTERNAL MEDICINE

## 2017-01-01 PROCEDURE — 25010000003 POTASSIUM CHLORIDE PER 2 MEQ: Performed by: INTERNAL MEDICINE

## 2017-01-01 PROCEDURE — 25010000002 FUROSEMIDE PER 20 MG: Performed by: FAMILY MEDICINE

## 2017-01-01 PROCEDURE — 82330 ASSAY OF CALCIUM: CPT | Performed by: FAMILY MEDICINE

## 2017-01-01 PROCEDURE — 84703 CHORIONIC GONADOTROPIN ASSAY: CPT | Performed by: EMERGENCY MEDICINE

## 2017-01-01 PROCEDURE — 86901 BLOOD TYPING SEROLOGIC RH(D): CPT | Performed by: EMERGENCY MEDICINE

## 2017-01-01 PROCEDURE — 76700 US EXAM ABDOM COMPLETE: CPT

## 2017-01-01 PROCEDURE — 82150 ASSAY OF AMYLASE: CPT | Performed by: EMERGENCY MEDICINE

## 2017-01-01 PROCEDURE — 86900 BLOOD TYPING SEROLOGIC ABO: CPT

## 2017-01-01 PROCEDURE — 25010000002 LORAZEPAM PER 2 MG: Performed by: FAMILY MEDICINE

## 2017-01-01 PROCEDURE — 83690 ASSAY OF LIPASE: CPT | Performed by: EMERGENCY MEDICINE

## 2017-01-01 PROCEDURE — 97162 PT EVAL MOD COMPLEX 30 MIN: CPT

## 2017-01-01 PROCEDURE — 83550 IRON BINDING TEST: CPT | Performed by: INTERNAL MEDICINE

## 2017-01-01 PROCEDURE — 86850 RBC ANTIBODY SCREEN: CPT | Performed by: EMERGENCY MEDICINE

## 2017-01-01 PROCEDURE — 82104 ALPHA-1-ANTITRYPSIN PHENO: CPT | Performed by: INTERNAL MEDICINE

## 2017-01-01 PROCEDURE — 36600 WITHDRAWAL OF ARTERIAL BLOOD: CPT

## 2017-01-01 PROCEDURE — 05HM33Z INSERTION OF INFUSION DEVICE INTO RIGHT INTERNAL JUGULAR VEIN, PERCUTANEOUS APPROACH: ICD-10-PCS | Performed by: EMERGENCY MEDICINE

## 2017-01-01 PROCEDURE — 81003 URINALYSIS AUTO W/O SCOPE: CPT | Performed by: INTERNAL MEDICINE

## 2017-01-01 PROCEDURE — 25010000002 PIPERACILLIN SOD-TAZOBACTAM PER 1 G: Performed by: FAMILY MEDICINE

## 2017-01-01 PROCEDURE — 86038 ANTINUCLEAR ANTIBODIES: CPT | Performed by: INTERNAL MEDICINE

## 2017-01-01 PROCEDURE — 87077 CULTURE AEROBIC IDENTIFY: CPT | Performed by: FAMILY MEDICINE

## 2017-01-01 PROCEDURE — 25010000002 LEVOFLOXACIN PER 250 MG: Performed by: FAMILY MEDICINE

## 2017-01-01 PROCEDURE — 25010000002 ONDANSETRON PER 1 MG: Performed by: EMERGENCY MEDICINE

## 2017-01-01 PROCEDURE — 81001 URINALYSIS AUTO W/SCOPE: CPT | Performed by: EMERGENCY MEDICINE

## 2017-01-01 PROCEDURE — 82140 ASSAY OF AMMONIA: CPT | Performed by: INTERNAL MEDICINE

## 2017-01-01 PROCEDURE — 86140 C-REACTIVE PROTEIN: CPT | Performed by: FAMILY MEDICINE

## 2017-01-01 PROCEDURE — 25010000002 MORPHINE PER 10 MG: Performed by: FAMILY MEDICINE

## 2017-01-01 PROCEDURE — 82803 BLOOD GASES ANY COMBINATION: CPT | Performed by: EMERGENCY MEDICINE

## 2017-01-01 PROCEDURE — 99212 OFFICE O/P EST SF 10 MIN: CPT | Performed by: PAIN MEDICINE

## 2017-01-01 PROCEDURE — 71275 CT ANGIOGRAPHY CHEST: CPT

## 2017-01-01 PROCEDURE — G8978 MOBILITY CURRENT STATUS: HCPCS

## 2017-01-01 PROCEDURE — 99212 OFFICE O/P EST SF 10 MIN: CPT | Performed by: NURSE PRACTITIONER

## 2017-01-01 PROCEDURE — 87040 BLOOD CULTURE FOR BACTERIA: CPT | Performed by: FAMILY MEDICINE

## 2017-01-01 PROCEDURE — 97166 OT EVAL MOD COMPLEX 45 MIN: CPT

## 2017-01-01 PROCEDURE — G8987 SELF CARE CURRENT STATUS: HCPCS

## 2017-01-01 PROCEDURE — 0 IOPAMIDOL PER 1 ML: Performed by: INTERNAL MEDICINE

## 2017-01-01 PROCEDURE — 80053 COMPREHEN METABOLIC PANEL: CPT | Performed by: EMERGENCY MEDICINE

## 2017-01-01 PROCEDURE — 87086 URINE CULTURE/COLONY COUNT: CPT | Performed by: EMERGENCY MEDICINE

## 2017-01-01 PROCEDURE — 84478 ASSAY OF TRIGLYCERIDES: CPT | Performed by: FAMILY MEDICINE

## 2017-01-01 PROCEDURE — 76942 ECHO GUIDE FOR BIOPSY: CPT

## 2017-01-01 PROCEDURE — 82803 BLOOD GASES ANY COMBINATION: CPT | Performed by: FAMILY MEDICINE

## 2017-01-01 PROCEDURE — 81001 URINALYSIS AUTO W/SCOPE: CPT | Performed by: INTERNAL MEDICINE

## 2017-01-01 PROCEDURE — 25010000002 MORPHINE PER 10 MG: Performed by: EMERGENCY MEDICINE

## 2017-01-01 PROCEDURE — 25010000002 MAGNESIUM SULFATE IN D5W 1G/100ML (PREMIX) 1-5 GM/100ML-% SOLUTION: Performed by: INTERNAL MEDICINE

## 2017-01-01 PROCEDURE — 99204 OFFICE O/P NEW MOD 45 MIN: CPT | Performed by: FAMILY MEDICINE

## 2017-01-01 PROCEDURE — 82140 ASSAY OF AMMONIA: CPT | Performed by: EMERGENCY MEDICINE

## 2017-01-01 PROCEDURE — 83540 ASSAY OF IRON: CPT | Performed by: INTERNAL MEDICINE

## 2017-01-01 PROCEDURE — 84300 ASSAY OF URINE SODIUM: CPT | Performed by: INTERNAL MEDICINE

## 2017-01-01 PROCEDURE — 86900 BLOOD TYPING SEROLOGIC ABO: CPT | Performed by: FAMILY MEDICINE

## 2017-01-01 PROCEDURE — 85610 PROTHROMBIN TIME: CPT | Performed by: EMERGENCY MEDICINE

## 2017-01-01 PROCEDURE — 82465 ASSAY BLD/SERUM CHOLESTEROL: CPT | Performed by: FAMILY MEDICINE

## 2017-01-01 PROCEDURE — 85007 BL SMEAR W/DIFF WBC COUNT: CPT | Performed by: EMERGENCY MEDICINE

## 2017-01-01 RX ORDER — DOCUSATE SODIUM 100 MG/1
100 CAPSULE, LIQUID FILLED ORAL 2 TIMES DAILY
COMMUNITY

## 2017-01-01 RX ORDER — IBUPROFEN 600 MG/1
600 TABLET ORAL EVERY 6 HOURS PRN
Status: DISCONTINUED | OUTPATIENT
Start: 2017-01-01 | End: 2017-01-01

## 2017-01-01 RX ORDER — SPIRONOLACTONE 50 MG/1
1 TABLET, FILM COATED ORAL DAILY
Refills: 0 | COMMUNITY
Start: 2017-01-01

## 2017-01-01 RX ORDER — MIDODRINE HYDROCHLORIDE 5 MG/1
10 TABLET ORAL
Status: DISCONTINUED | OUTPATIENT
Start: 2017-01-01 | End: 2017-01-01

## 2017-01-01 RX ORDER — DICLOFENAC SODIUM 75 MG/1
75 TABLET, DELAYED RELEASE ORAL 2 TIMES DAILY
Qty: 60 TABLET | Refills: 1 | Status: SHIPPED | OUTPATIENT
Start: 2017-01-01 | End: 2017-01-01

## 2017-01-01 RX ORDER — ALBUMIN, HUMAN INJ 5% 5 %
250 SOLUTION INTRAVENOUS ONCE
Status: COMPLETED | OUTPATIENT
Start: 2017-01-01 | End: 2017-01-01

## 2017-01-01 RX ORDER — PANTOPRAZOLE SODIUM 40 MG/1
40 TABLET, DELAYED RELEASE ORAL EVERY MORNING
Status: DISCONTINUED | OUTPATIENT
Start: 2017-01-01 | End: 2017-01-01

## 2017-01-01 RX ORDER — IBUPROFEN 800 MG/1
TABLET ORAL
Refills: 0 | COMMUNITY
Start: 2016-01-01

## 2017-01-01 RX ORDER — CHOLECALCIFEROL (VITAMIN D3) 1250 MCG
50000 CAPSULE ORAL 2 TIMES WEEKLY
Status: DISCONTINUED | OUTPATIENT
Start: 2017-01-01 | End: 2017-01-01

## 2017-01-01 RX ORDER — LORAZEPAM 2 MG/ML
1 INJECTION INTRAMUSCULAR
Status: DISCONTINUED | OUTPATIENT
Start: 2017-01-01 | End: 2017-08-14 | Stop reason: HOSPADM

## 2017-01-01 RX ORDER — POTASSIUM CHLORIDE 750 MG/1
40 CAPSULE, EXTENDED RELEASE ORAL AS NEEDED
Status: DISCONTINUED | OUTPATIENT
Start: 2017-01-01 | End: 2017-08-14 | Stop reason: HOSPADM

## 2017-01-01 RX ORDER — ALBUMIN (HUMAN) 12.5 G/50ML
12.5 SOLUTION INTRAVENOUS ONCE
Status: COMPLETED | OUTPATIENT
Start: 2017-01-01 | End: 2017-01-01

## 2017-01-01 RX ORDER — ESCITALOPRAM OXALATE 10 MG/1
10 TABLET ORAL DAILY
COMMUNITY
End: 2017-01-01 | Stop reason: ALTCHOICE

## 2017-01-01 RX ORDER — CYCLOBENZAPRINE HCL 10 MG
10 TABLET ORAL 2 TIMES DAILY
Status: DISCONTINUED | OUTPATIENT
Start: 2017-01-01 | End: 2017-01-01

## 2017-01-01 RX ORDER — HEPARIN SODIUM 5000 [USP'U]/ML
5000 INJECTION, SOLUTION INTRAVENOUS; SUBCUTANEOUS EVERY 12 HOURS SCHEDULED
Status: DISCONTINUED | OUTPATIENT
Start: 2017-01-01 | End: 2017-01-01

## 2017-01-01 RX ORDER — LOPERAMIDE HYDROCHLORIDE 2 MG/1
2 CAPSULE ORAL 3 TIMES DAILY PRN
Status: DISCONTINUED | OUTPATIENT
Start: 2017-01-01 | End: 2017-01-01

## 2017-01-01 RX ORDER — FUROSEMIDE 10 MG/ML
40 INJECTION INTRAMUSCULAR; INTRAVENOUS ONCE
Status: COMPLETED | OUTPATIENT
Start: 2017-01-01 | End: 2017-01-01

## 2017-01-01 RX ORDER — SODIUM CHLORIDE 1000 MG
1 TABLET, SOLUBLE MISCELLANEOUS
Status: DISCONTINUED | OUTPATIENT
Start: 2017-01-01 | End: 2017-01-01

## 2017-01-01 RX ORDER — ONDANSETRON 2 MG/ML
4 INJECTION INTRAMUSCULAR; INTRAVENOUS EVERY 6 HOURS PRN
Status: DISCONTINUED | OUTPATIENT
Start: 2017-01-01 | End: 2017-01-01

## 2017-01-01 RX ORDER — VENLAFAXINE 25 MG/1
12.5 TABLET ORAL EVERY MORNING
Status: DISCONTINUED | OUTPATIENT
Start: 2017-01-01 | End: 2017-01-01

## 2017-01-01 RX ORDER — LORAZEPAM 2 MG/ML
1 INJECTION INTRAMUSCULAR
Status: DISCONTINUED | OUTPATIENT
Start: 2017-01-01 | End: 2017-01-01

## 2017-01-01 RX ORDER — TIZANIDINE 2 MG/1
TABLET ORAL
Qty: 60 TABLET | Refills: 1 | OUTPATIENT
Start: 2017-01-01

## 2017-01-01 RX ORDER — PROPRANOLOL HYDROCHLORIDE 20 MG/1
20 TABLET ORAL EVERY 8 HOURS SCHEDULED
Status: DISCONTINUED | OUTPATIENT
Start: 2017-01-01 | End: 2017-01-01

## 2017-01-01 RX ORDER — SODIUM CHLORIDE 0.9 % (FLUSH) 0.9 %
1-10 SYRINGE (ML) INJECTION AS NEEDED
Status: DISCONTINUED | OUTPATIENT
Start: 2017-01-01 | End: 2017-08-14 | Stop reason: HOSPADM

## 2017-01-01 RX ORDER — ONDANSETRON 2 MG/ML
4 INJECTION INTRAMUSCULAR; INTRAVENOUS ONCE
Status: COMPLETED | OUTPATIENT
Start: 2017-01-01 | End: 2017-01-01

## 2017-01-01 RX ORDER — LORAZEPAM 2 MG/ML
1 INJECTION INTRAMUSCULAR EVERY 6 HOURS PRN
Status: DISCONTINUED | OUTPATIENT
Start: 2017-01-01 | End: 2017-01-01

## 2017-01-01 RX ORDER — LORAZEPAM 0.5 MG/1
0.5 TABLET ORAL
Status: DISCONTINUED | OUTPATIENT
Start: 2017-01-01 | End: 2017-01-01

## 2017-01-01 RX ORDER — LORAZEPAM 2 MG/1
2 TABLET ORAL
Status: DISCONTINUED | OUTPATIENT
Start: 2017-01-01 | End: 2017-01-01

## 2017-01-01 RX ORDER — TIZANIDINE 2 MG/1
4 TABLET ORAL 2 TIMES DAILY
Qty: 60 TABLET | Refills: 1 | Status: SHIPPED | OUTPATIENT
Start: 2017-01-01 | End: 2017-01-01

## 2017-01-01 RX ORDER — FAMOTIDINE 10 MG/ML
20 INJECTION, SOLUTION INTRAVENOUS EVERY 12 HOURS SCHEDULED
Status: DISCONTINUED | OUTPATIENT
Start: 2017-01-01 | End: 2017-01-01

## 2017-01-01 RX ORDER — 3% SODIUM CHLORIDE 3 G/100ML
35 INJECTION, SOLUTION INTRAVENOUS CONTINUOUS
Status: DISCONTINUED | OUTPATIENT
Start: 2017-01-01 | End: 2017-01-01

## 2017-01-01 RX ORDER — DOCUSATE SODIUM 100 MG/1
100 CAPSULE, LIQUID FILLED ORAL 2 TIMES DAILY
Status: DISCONTINUED | OUTPATIENT
Start: 2017-01-01 | End: 2017-01-01

## 2017-01-01 RX ORDER — POTASSIUM CHLORIDE 7.45 MG/ML
10 INJECTION INTRAVENOUS
Status: DISCONTINUED | OUTPATIENT
Start: 2017-01-01 | End: 2017-08-14 | Stop reason: HOSPADM

## 2017-01-01 RX ORDER — 3% SODIUM CHLORIDE 3 G/100ML
25 INJECTION, SOLUTION INTRAVENOUS CONTINUOUS
Status: DISCONTINUED | OUTPATIENT
Start: 2017-01-01 | End: 2017-01-01

## 2017-01-01 RX ORDER — CYCLOBENZAPRINE HCL 10 MG
10 TABLET ORAL 2 TIMES DAILY
Qty: 60 TABLET | Refills: 0 | Status: SHIPPED | OUTPATIENT
Start: 2017-01-01

## 2017-01-01 RX ORDER — MORPHINE SULFATE 4 MG/ML
1 INJECTION, SOLUTION INTRAMUSCULAR; INTRAVENOUS EVERY 4 HOURS PRN
Status: DISCONTINUED | OUTPATIENT
Start: 2017-01-01 | End: 2017-01-01

## 2017-01-01 RX ORDER — METHOCARBAMOL 500 MG/1
TABLET, FILM COATED ORAL
Refills: 0 | COMMUNITY
Start: 2016-01-01 | End: 2017-01-01 | Stop reason: ALTCHOICE

## 2017-01-01 RX ORDER — MAGNESIUM SULFATE 1 G/100ML
1 INJECTION INTRAVENOUS ONCE
Status: COMPLETED | OUTPATIENT
Start: 2017-01-01 | End: 2017-01-01

## 2017-01-01 RX ORDER — SPIRONOLACTONE 25 MG/1
25 TABLET ORAL DAILY
Status: DISCONTINUED | OUTPATIENT
Start: 2017-01-01 | End: 2017-01-01

## 2017-01-01 RX ORDER — VENLAFAXINE 75 MG/1
75 TABLET ORAL EVERY MORNING
COMMUNITY

## 2017-01-01 RX ORDER — POTASSIUM CHLORIDE 7.45 MG/ML
10 INJECTION INTRAVENOUS
Status: COMPLETED | OUTPATIENT
Start: 2017-01-01 | End: 2017-01-01

## 2017-01-01 RX ORDER — POTASSIUM CHLORIDE 1.5 G/1.77G
40 POWDER, FOR SOLUTION ORAL AS NEEDED
Status: DISCONTINUED | OUTPATIENT
Start: 2017-01-01 | End: 2017-08-14 | Stop reason: HOSPADM

## 2017-01-01 RX ORDER — VENLAFAXINE 25 MG/1
25 TABLET ORAL EVERY MORNING
Status: DISCONTINUED | OUTPATIENT
Start: 2017-01-01 | End: 2017-01-01

## 2017-01-01 RX ORDER — ZOLPIDEM TARTRATE 5 MG/1
5 TABLET ORAL NIGHTLY PRN
Status: DISCONTINUED | OUTPATIENT
Start: 2017-01-01 | End: 2017-01-01

## 2017-01-01 RX ORDER — ALBUMIN (HUMAN) 12.5 G/50ML
25 SOLUTION INTRAVENOUS DAILY
Status: COMPLETED | OUTPATIENT
Start: 2017-01-01 | End: 2017-01-01

## 2017-01-01 RX ORDER — SODIUM CHLORIDE 9 MG/ML
50 INJECTION, SOLUTION INTRAVENOUS CONTINUOUS
Status: DISCONTINUED | OUTPATIENT
Start: 2017-01-01 | End: 2017-01-01

## 2017-01-01 RX ORDER — ONDANSETRON 4 MG/1
4 TABLET, ORALLY DISINTEGRATING ORAL EVERY 6 HOURS PRN
Status: DISCONTINUED | OUTPATIENT
Start: 2017-01-01 | End: 2017-01-01

## 2017-01-01 RX ORDER — SODIUM CHLORIDE 9 MG/ML
INJECTION, SOLUTION INTRAVENOUS
Status: DISPENSED
Start: 2017-01-01 | End: 2017-01-01

## 2017-01-01 RX ORDER — FUROSEMIDE 10 MG/ML
20 INJECTION INTRAMUSCULAR; INTRAVENOUS EVERY 12 HOURS
Status: DISCONTINUED | OUTPATIENT
Start: 2017-01-01 | End: 2017-01-01

## 2017-01-01 RX ORDER — LORAZEPAM 2 MG/ML
2 INJECTION INTRAMUSCULAR
Status: DISCONTINUED | OUTPATIENT
Start: 2017-01-01 | End: 2017-01-01

## 2017-01-01 RX ORDER — OCTREOTIDE ACETATE 50 UG/ML
50 INJECTION, SOLUTION INTRAVENOUS; SUBCUTANEOUS 3 TIMES DAILY
Status: DISCONTINUED | OUTPATIENT
Start: 2017-01-01 | End: 2017-01-01

## 2017-01-01 RX ORDER — TRAMADOL HYDROCHLORIDE 50 MG/1
50 TABLET ORAL EVERY 8 HOURS PRN
Status: DISCONTINUED | OUTPATIENT
Start: 2017-01-01 | End: 2017-01-01

## 2017-01-01 RX ORDER — DICLOFENAC SODIUM 75 MG/1
TABLET, DELAYED RELEASE ORAL
Qty: 60 TABLET | Refills: 1 | OUTPATIENT
Start: 2017-01-01

## 2017-01-01 RX ORDER — VENLAFAXINE 75 MG/1
75 TABLET ORAL EVERY MORNING
Status: DISCONTINUED | OUTPATIENT
Start: 2017-01-01 | End: 2017-01-01

## 2017-01-01 RX ORDER — MORPHINE SULFATE 4 MG/ML
4 INJECTION, SOLUTION INTRAMUSCULAR; INTRAVENOUS ONCE
Status: COMPLETED | OUTPATIENT
Start: 2017-01-01 | End: 2017-01-01

## 2017-01-01 RX ORDER — NICOTINE 21 MG/24HR
1 PATCH, TRANSDERMAL 24 HOURS TRANSDERMAL EVERY 24 HOURS
Status: DISCONTINUED | OUTPATIENT
Start: 2017-01-01 | End: 2017-01-01

## 2017-01-01 RX ORDER — LORAZEPAM 2 MG/ML
0.5 INJECTION INTRAMUSCULAR
Status: DISCONTINUED | OUTPATIENT
Start: 2017-01-01 | End: 2017-01-01

## 2017-01-01 RX ORDER — SPIRONOLACTONE 50 MG/1
50 TABLET, FILM COATED ORAL DAILY
Status: DISCONTINUED | OUTPATIENT
Start: 2017-01-01 | End: 2017-01-01

## 2017-01-01 RX ORDER — SCOLOPAMINE TRANSDERMAL SYSTEM 1 MG/1
1 PATCH, EXTENDED RELEASE TRANSDERMAL
Status: DISCONTINUED | OUTPATIENT
Start: 2017-01-01 | End: 2017-08-14 | Stop reason: HOSPADM

## 2017-01-01 RX ORDER — MORPHINE SULFATE 4 MG/ML
1 INJECTION, SOLUTION INTRAMUSCULAR; INTRAVENOUS
Status: DISCONTINUED | OUTPATIENT
Start: 2017-01-01 | End: 2017-01-01

## 2017-01-01 RX ORDER — BISACODYL 10 MG
10 SUPPOSITORY, RECTAL RECTAL DAILY PRN
Status: DISCONTINUED | OUTPATIENT
Start: 2017-01-01 | End: 2017-01-01

## 2017-01-01 RX ORDER — LORAZEPAM 2 MG/ML
0.5 INJECTION INTRAMUSCULAR ONCE
Status: COMPLETED | OUTPATIENT
Start: 2017-01-01 | End: 2017-01-01

## 2017-01-01 RX ORDER — MORPHINE SULFATE 2 MG/ML
2 INJECTION, SOLUTION INTRAMUSCULAR; INTRAVENOUS
Status: DISCONTINUED | OUTPATIENT
Start: 2017-01-01 | End: 2017-01-01 | Stop reason: RX

## 2017-01-01 RX ORDER — ACETAMINOPHEN 325 MG/1
650 TABLET ORAL EVERY 4 HOURS PRN
Status: DISCONTINUED | OUTPATIENT
Start: 2017-01-01 | End: 2017-01-01

## 2017-01-01 RX ORDER — DIAPER,BRIEF,INFANT-TODD,DISP
EACH MISCELLANEOUS EVERY 12 HOURS SCHEDULED
Status: DISCONTINUED | OUTPATIENT
Start: 2017-01-01 | End: 2017-01-01

## 2017-01-01 RX ORDER — DEXTROSE MONOHYDRATE 25 G/50ML
50 INJECTION, SOLUTION INTRAVENOUS
Status: DISCONTINUED | OUTPATIENT
Start: 2017-01-01 | End: 2017-08-14 | Stop reason: HOSPADM

## 2017-01-01 RX ORDER — VENLAFAXINE 37.5 MG/1
37.5 TABLET ORAL EVERY MORNING
Status: DISCONTINUED | OUTPATIENT
Start: 2017-01-01 | End: 2017-01-01

## 2017-01-01 RX ORDER — ONDANSETRON 4 MG/1
4 TABLET, FILM COATED ORAL EVERY 6 HOURS PRN
Status: DISCONTINUED | OUTPATIENT
Start: 2017-01-01 | End: 2017-01-01

## 2017-01-01 RX ORDER — LEVOFLOXACIN 5 MG/ML
500 INJECTION, SOLUTION INTRAVENOUS EVERY 24 HOURS
Status: DISCONTINUED | OUTPATIENT
Start: 2017-01-01 | End: 2017-01-01

## 2017-01-01 RX ORDER — IPRATROPIUM BROMIDE AND ALBUTEROL SULFATE 2.5; .5 MG/3ML; MG/3ML
3 SOLUTION RESPIRATORY (INHALATION) ONCE
Status: DISCONTINUED | OUTPATIENT
Start: 2017-01-01 | End: 2017-01-01

## 2017-01-01 RX ORDER — FAMOTIDINE 20 MG/1
20 TABLET, FILM COATED ORAL 2 TIMES DAILY
Status: DISCONTINUED | OUTPATIENT
Start: 2017-01-01 | End: 2017-01-01

## 2017-01-01 RX ORDER — MIRTAZAPINE 15 MG/1
15 TABLET, FILM COATED ORAL NIGHTLY
COMMUNITY

## 2017-01-01 RX ORDER — OMEPRAZOLE 20 MG/1
1 CAPSULE, DELAYED RELEASE ORAL DAILY
Refills: 0 | COMMUNITY
Start: 2017-01-01

## 2017-01-01 RX ORDER — DEXTROSE, SODIUM CHLORIDE, AND POTASSIUM CHLORIDE 5; .9; .15 G/100ML; G/100ML; G/100ML
125 INJECTION INTRAVENOUS CONTINUOUS
Status: DISCONTINUED | OUTPATIENT
Start: 2017-01-01 | End: 2017-01-01

## 2017-01-01 RX ORDER — MORPHINE SULFATE 2 MG/ML
1 INJECTION, SOLUTION INTRAMUSCULAR; INTRAVENOUS EVERY 4 HOURS PRN
Status: DISCONTINUED | OUTPATIENT
Start: 2017-01-01 | End: 2017-01-01

## 2017-01-01 RX ORDER — POTASSIUM CHLORIDE 750 MG/1
40 CAPSULE, EXTENDED RELEASE ORAL ONCE
Status: DISCONTINUED | OUTPATIENT
Start: 2017-01-01 | End: 2017-01-01

## 2017-01-01 RX ORDER — TRAMADOL HYDROCHLORIDE 50 MG/1
TABLET ORAL
Refills: 0 | COMMUNITY
Start: 2017-01-01

## 2017-01-01 RX ORDER — MELOXICAM 15 MG/1
TABLET ORAL
Refills: 0 | COMMUNITY
Start: 2017-01-01

## 2017-01-01 RX ORDER — METOPROLOL TARTRATE 5 MG/5ML
5 INJECTION INTRAVENOUS ONCE
Status: COMPLETED | OUTPATIENT
Start: 2017-01-01 | End: 2017-01-01

## 2017-01-01 RX ORDER — FUROSEMIDE 10 MG/ML
20 INJECTION INTRAMUSCULAR; INTRAVENOUS 3 TIMES DAILY
Status: DISCONTINUED | OUTPATIENT
Start: 2017-01-01 | End: 2017-01-01

## 2017-01-01 RX ORDER — MIRTAZAPINE 15 MG/1
15 TABLET, FILM COATED ORAL NIGHTLY
Status: DISCONTINUED | OUTPATIENT
Start: 2017-01-01 | End: 2017-01-01

## 2017-01-01 RX ORDER — ALBUMIN (HUMAN) 12.5 G/50ML
25 SOLUTION INTRAVENOUS 3 TIMES DAILY
Status: DISCONTINUED | OUTPATIENT
Start: 2017-01-01 | End: 2017-01-01

## 2017-01-01 RX ORDER — MIDODRINE HYDROCHLORIDE 2.5 MG/1
2.5 TABLET ORAL
Status: DISCONTINUED | OUTPATIENT
Start: 2017-01-01 | End: 2017-01-01

## 2017-01-01 RX ORDER — LORAZEPAM 1 MG/1
1 TABLET ORAL
Status: DISCONTINUED | OUTPATIENT
Start: 2017-01-01 | End: 2017-01-01

## 2017-01-01 RX ORDER — METOPROLOL TARTRATE 5 MG/5ML
5 INJECTION INTRAVENOUS EVERY 6 HOURS PRN
Status: DISCONTINUED | OUTPATIENT
Start: 2017-01-01 | End: 2017-01-01

## 2017-01-01 RX ORDER — MORPHINE SULFATE 4 MG/ML
2 INJECTION, SOLUTION INTRAMUSCULAR; INTRAVENOUS
Status: DISCONTINUED | OUTPATIENT
Start: 2017-01-01 | End: 2017-08-14 | Stop reason: HOSPADM

## 2017-01-01 RX ORDER — SODIUM CHLORIDE 0.9 % (FLUSH) 0.9 %
10 SYRINGE (ML) INJECTION AS NEEDED
Status: DISCONTINUED | OUTPATIENT
Start: 2017-01-01 | End: 2017-08-14 | Stop reason: HOSPADM

## 2017-01-01 RX ORDER — LORAZEPAM 2 MG/ML
0.5 INJECTION INTRAMUSCULAR EVERY 6 HOURS PRN
Status: DISCONTINUED | OUTPATIENT
Start: 2017-01-01 | End: 2017-01-01

## 2017-01-01 RX ORDER — CETIRIZINE HYDROCHLORIDE 10 MG/1
TABLET ORAL
Refills: 0 | COMMUNITY
Start: 2017-01-01

## 2017-01-01 RX ORDER — SODIUM CHLORIDE 1000 MG
2 TABLET, SOLUBLE MISCELLANEOUS
Status: DISCONTINUED | OUTPATIENT
Start: 2017-01-01 | End: 2017-01-01

## 2017-01-01 RX ORDER — POTASSIUM CHLORIDE 29.8 MG/ML
20 INJECTION INTRAVENOUS
Status: DISCONTINUED | OUTPATIENT
Start: 2017-01-01 | End: 2017-08-14 | Stop reason: HOSPADM

## 2017-01-01 RX ORDER — HYDRALAZINE HYDROCHLORIDE 20 MG/ML
10 INJECTION INTRAMUSCULAR; INTRAVENOUS EVERY 6 HOURS PRN
Status: DISCONTINUED | OUTPATIENT
Start: 2017-01-01 | End: 2017-01-01

## 2017-01-01 RX ORDER — CYCLOBENZAPRINE HCL 10 MG
TABLET ORAL
Refills: 0 | COMMUNITY
Start: 2017-01-01

## 2017-01-01 RX ORDER — SODIUM CHLORIDE 9 MG/ML
125 INJECTION, SOLUTION INTRAVENOUS CONTINUOUS
Status: DISCONTINUED | OUTPATIENT
Start: 2017-01-01 | End: 2017-01-01

## 2017-01-01 RX ADMIN — MORPHINE SULFATE 1 MG: 4 INJECTION, SOLUTION INTRAMUSCULAR; INTRAVENOUS at 18:34

## 2017-01-01 RX ADMIN — POTASSIUM CHLORIDE 10 MEQ: 7.46 INJECTION, SOLUTION INTRAVENOUS at 11:41

## 2017-01-01 RX ADMIN — Medication 10 ML: at 18:36

## 2017-01-01 RX ADMIN — MORPHINE SULFATE 1 MG: 4 INJECTION, SOLUTION INTRAMUSCULAR; INTRAVENOUS at 15:02

## 2017-01-01 RX ADMIN — CYCLOBENZAPRINE HYDROCHLORIDE 10 MG: 10 TABLET, FILM COATED ORAL at 09:10

## 2017-01-01 RX ADMIN — ONDANSETRON 4 MG: 2 INJECTION INTRAMUSCULAR; INTRAVENOUS at 16:28

## 2017-01-01 RX ADMIN — CYCLOBENZAPRINE HYDROCHLORIDE 10 MG: 10 TABLET, FILM COATED ORAL at 17:55

## 2017-01-01 RX ADMIN — SPIRONOLACTONE 50 MG: 50 TABLET, FILM COATED ORAL at 10:01

## 2017-01-01 RX ADMIN — MORPHINE SULFATE 1 MG: 2 INJECTION, SOLUTION INTRAMUSCULAR; INTRAVENOUS at 20:08

## 2017-01-01 RX ADMIN — MORPHINE SULFATE 2 MG: 4 INJECTION, SOLUTION INTRAMUSCULAR; INTRAVENOUS at 15:03

## 2017-01-01 RX ADMIN — DOCUSATE SODIUM 100 MG: 100 CAPSULE, LIQUID FILLED ORAL at 18:13

## 2017-01-01 RX ADMIN — MAGNESIUM SULFATE HEPTAHYDRATE 1 G: 1 INJECTION, SOLUTION INTRAVENOUS at 13:58

## 2017-01-01 RX ADMIN — MORPHINE SULFATE 1 MG: 2 INJECTION, SOLUTION INTRAMUSCULAR; INTRAVENOUS at 10:27

## 2017-01-01 RX ADMIN — POTASSIUM CHLORIDE 10 MEQ: 7.46 INJECTION, SOLUTION INTRAVENOUS at 07:31

## 2017-01-01 RX ADMIN — MIDODRINE HYDROCHLORIDE 2.5 MG: 2.5 TABLET ORAL at 17:49

## 2017-01-01 RX ADMIN — DEXTROSE MONOHYDRATE 50 ML: 500 INJECTION PARENTERAL at 06:48

## 2017-01-01 RX ADMIN — FAMOTIDINE 20 MG: 20 TABLET ORAL at 09:58

## 2017-01-01 RX ADMIN — DOCUSATE SODIUM 100 MG: 100 CAPSULE, LIQUID FILLED ORAL at 09:59

## 2017-01-01 RX ADMIN — TRAMADOL HYDROCHLORIDE 50 MG: 50 TABLET, FILM COATED ORAL at 20:12

## 2017-01-01 RX ADMIN — ZOLPIDEM TARTRATE 5 MG: 5 TABLET, FILM COATED ORAL at 21:58

## 2017-01-01 RX ADMIN — OCTREOTIDE ACETATE 50 MCG: 50 INJECTION, SOLUTION INTRAVENOUS; SUBCUTANEOUS at 16:34

## 2017-01-01 RX ADMIN — FAMOTIDINE 20 MG: 20 TABLET ORAL at 18:03

## 2017-01-01 RX ADMIN — FUROSEMIDE 20 MG: 10 INJECTION, SOLUTION INTRAVENOUS at 13:45

## 2017-01-01 RX ADMIN — Medication 10 ML: at 11:04

## 2017-01-01 RX ADMIN — DEXTROSE MONOHYDRATE 50 ML: 500 INJECTION PARENTERAL at 08:39

## 2017-01-01 RX ADMIN — OCTREOTIDE ACETATE 50 MCG: 50 INJECTION, SOLUTION INTRAVENOUS; SUBCUTANEOUS at 08:36

## 2017-01-01 RX ADMIN — FAMOTIDINE 20 MG: 20 TABLET ORAL at 18:14

## 2017-01-01 RX ADMIN — VENLAFAXINE HYDROCHLORIDE 37.5 MG: 37.5 TABLET ORAL at 06:48

## 2017-01-01 RX ADMIN — MIDODRINE HYDROCHLORIDE 10 MG: 5 TABLET ORAL at 06:42

## 2017-01-01 RX ADMIN — DOCUSATE SODIUM 100 MG: 100 CAPSULE, LIQUID FILLED ORAL at 09:57

## 2017-01-01 RX ADMIN — POTASSIUM CHLORIDE 10 MEQ: 7.46 INJECTION, SOLUTION INTRAVENOUS at 10:39

## 2017-01-01 RX ADMIN — OCTREOTIDE ACETATE 50 MCG: 50 INJECTION, SOLUTION INTRAVENOUS; SUBCUTANEOUS at 16:36

## 2017-01-01 RX ADMIN — FAMOTIDINE 20 MG: 20 TABLET ORAL at 10:00

## 2017-01-01 RX ADMIN — POTASSIUM CHLORIDE 40 MEQ: 750 CAPSULE, EXTENDED RELEASE ORAL at 06:07

## 2017-01-01 RX ADMIN — Medication 10 ML: at 09:17

## 2017-01-01 RX ADMIN — OCTREOTIDE ACETATE 50 MCG: 50 INJECTION, SOLUTION INTRAVENOUS; SUBCUTANEOUS at 21:47

## 2017-01-01 RX ADMIN — FAMOTIDINE 20 MG: 20 TABLET ORAL at 08:54

## 2017-01-01 RX ADMIN — Medication 10 ML: at 14:23

## 2017-01-01 RX ADMIN — MIDODRINE HYDROCHLORIDE 10 MG: 5 TABLET ORAL at 17:24

## 2017-01-01 RX ADMIN — MORPHINE SULFATE 1 MG: 2 INJECTION, SOLUTION INTRAMUSCULAR; INTRAVENOUS at 01:57

## 2017-01-01 RX ADMIN — FUROSEMIDE 20 MG: 10 INJECTION, SOLUTION INTRAVENOUS at 09:00

## 2017-01-01 RX ADMIN — BACITRACIN: 500 OINTMENT TOPICAL at 21:26

## 2017-01-01 RX ADMIN — MORPHINE SULFATE 1 MG: 4 INJECTION, SOLUTION INTRAMUSCULAR; INTRAVENOUS at 12:39

## 2017-01-01 RX ADMIN — PROPRANOLOL HYDROCHLORIDE 20 MG: 20 TABLET ORAL at 06:17

## 2017-01-01 RX ADMIN — MIDODRINE HYDROCHLORIDE 10 MG: 5 TABLET ORAL at 17:31

## 2017-01-01 RX ADMIN — MORPHINE SULFATE 1 MG: 2 INJECTION, SOLUTION INTRAMUSCULAR; INTRAVENOUS at 19:12

## 2017-01-01 RX ADMIN — MORPHINE SULFATE 1 MG: 4 INJECTION, SOLUTION INTRAMUSCULAR; INTRAVENOUS at 13:31

## 2017-01-01 RX ADMIN — METOPROLOL TARTRATE 5 MG: 5 INJECTION INTRAVENOUS at 04:52

## 2017-01-01 RX ADMIN — OFLOXACIN 50000 UNITS: 300 TABLET, COATED ORAL at 09:16

## 2017-01-01 RX ADMIN — MORPHINE SULFATE 1 MG: 2 INJECTION, SOLUTION INTRAMUSCULAR; INTRAVENOUS at 14:34

## 2017-01-01 RX ADMIN — TRAMADOL HYDROCHLORIDE 50 MG: 50 TABLET, FILM COATED ORAL at 11:17

## 2017-01-01 RX ADMIN — POTASSIUM CHLORIDE 40 MEQ: 750 CAPSULE, EXTENDED RELEASE ORAL at 10:01

## 2017-01-01 RX ADMIN — ALBUMIN (HUMAN) 25 G: 0.25 INJECTION, SOLUTION INTRAVENOUS at 20:45

## 2017-01-01 RX ADMIN — HEPARIN SODIUM 5000 UNITS: 5000 INJECTION, SOLUTION INTRAVENOUS; SUBCUTANEOUS at 02:41

## 2017-01-01 RX ADMIN — CYCLOBENZAPRINE HYDROCHLORIDE 10 MG: 10 TABLET, FILM COATED ORAL at 09:15

## 2017-01-01 RX ADMIN — LOPERAMIDE HYDROCHLORIDE 2 MG: 2 CAPSULE ORAL at 09:15

## 2017-01-01 RX ADMIN — FUROSEMIDE 20 MG: 10 INJECTION, SOLUTION INTRAVENOUS at 16:34

## 2017-01-01 RX ADMIN — LOPERAMIDE HYDROCHLORIDE 2 MG: 2 CAPSULE ORAL at 20:02

## 2017-01-01 RX ADMIN — ALBUMIN (HUMAN) 12.5 G: 0.25 INJECTION, SOLUTION INTRAVENOUS at 10:41

## 2017-01-01 RX ADMIN — VENLAFAXINE 12.5 MG: 25 TABLET ORAL at 09:15

## 2017-01-01 RX ADMIN — MIDODRINE HYDROCHLORIDE 10 MG: 5 TABLET ORAL at 10:41

## 2017-01-01 RX ADMIN — LORAZEPAM 2 MG: 2 INJECTION INTRAMUSCULAR; INTRAVENOUS at 08:44

## 2017-01-01 RX ADMIN — MORPHINE SULFATE 1 MG: 4 INJECTION, SOLUTION INTRAMUSCULAR; INTRAVENOUS at 21:21

## 2017-01-01 RX ADMIN — MIRTAZAPINE 15 MG: 15 TABLET, FILM COATED ORAL at 02:31

## 2017-01-01 RX ADMIN — CYCLOBENZAPRINE HYDROCHLORIDE 10 MG: 10 TABLET, FILM COATED ORAL at 09:59

## 2017-01-01 RX ADMIN — POTASSIUM CHLORIDE 10 MEQ: 7.46 INJECTION, SOLUTION INTRAVENOUS at 09:21

## 2017-01-01 RX ADMIN — MORPHINE SULFATE 1 MG: 4 INJECTION, SOLUTION INTRAMUSCULAR; INTRAVENOUS at 09:51

## 2017-01-01 RX ADMIN — VENLAFAXINE HYDROCHLORIDE 37.5 MG: 37.5 TABLET ORAL at 06:05

## 2017-01-01 RX ADMIN — FAMOTIDINE 20 MG: 20 TABLET ORAL at 08:51

## 2017-01-01 RX ADMIN — MORPHINE SULFATE 4 MG: 4 INJECTION, SOLUTION INTRAMUSCULAR; INTRAVENOUS at 00:34

## 2017-01-01 RX ADMIN — MORPHINE SULFATE 1 MG: 2 INJECTION, SOLUTION INTRAMUSCULAR; INTRAVENOUS at 10:00

## 2017-01-01 RX ADMIN — MIDODRINE HYDROCHLORIDE 10 MG: 5 TABLET ORAL at 06:30

## 2017-01-01 RX ADMIN — PANTOPRAZOLE SODIUM 40 MG: 40 TABLET, DELAYED RELEASE ORAL at 09:19

## 2017-01-01 RX ADMIN — MORPHINE SULFATE 1 MG: 2 INJECTION, SOLUTION INTRAMUSCULAR; INTRAVENOUS at 14:45

## 2017-01-01 RX ADMIN — OFLOXACIN 50000 UNITS: 300 TABLET, COATED ORAL at 10:40

## 2017-01-01 RX ADMIN — PROPRANOLOL HYDROCHLORIDE 20 MG: 20 TABLET ORAL at 21:02

## 2017-01-01 RX ADMIN — MIDODRINE HYDROCHLORIDE 2.5 MG: 2.5 TABLET ORAL at 11:16

## 2017-01-01 RX ADMIN — OCTREOTIDE ACETATE 50 MCG: 50 INJECTION, SOLUTION INTRAVENOUS; SUBCUTANEOUS at 20:43

## 2017-01-01 RX ADMIN — POTASSIUM CHLORIDE 10 MEQ: 7.46 INJECTION, SOLUTION INTRAVENOUS at 09:39

## 2017-01-01 RX ADMIN — VENLAFAXINE HYDROCHLORIDE 75 MG: 75 TABLET ORAL at 09:59

## 2017-01-01 RX ADMIN — VENLAFAXINE HYDROCHLORIDE 37.5 MG: 37.5 TABLET ORAL at 06:17

## 2017-01-01 RX ADMIN — ALBUMIN (HUMAN) 25 G: 0.25 INJECTION, SOLUTION INTRAVENOUS at 10:41

## 2017-01-01 RX ADMIN — ALBUMIN (HUMAN) 25 G: 0.25 INJECTION, SOLUTION INTRAVENOUS at 09:30

## 2017-01-01 RX ADMIN — ONDANSETRON 4 MG: 2 INJECTION INTRAMUSCULAR; INTRAVENOUS at 09:23

## 2017-01-01 RX ADMIN — FAMOTIDINE 20 MG: 20 TABLET ORAL at 17:55

## 2017-01-01 RX ADMIN — MIDODRINE HYDROCHLORIDE 10 MG: 5 TABLET ORAL at 18:46

## 2017-01-01 RX ADMIN — OCTREOTIDE ACETATE 50 MCG: 50 INJECTION, SOLUTION INTRAVENOUS; SUBCUTANEOUS at 21:55

## 2017-01-01 RX ADMIN — MIDODRINE HYDROCHLORIDE 2.5 MG: 2.5 TABLET ORAL at 08:51

## 2017-01-01 RX ADMIN — SODIUM CHLORIDE 250 ML: 9 INJECTION, SOLUTION INTRAVENOUS at 16:27

## 2017-01-01 RX ADMIN — ONDANSETRON 4 MG: 2 INJECTION INTRAMUSCULAR; INTRAVENOUS at 11:09

## 2017-01-01 RX ADMIN — LORAZEPAM 1 MG: 2 INJECTION INTRAMUSCULAR; INTRAVENOUS at 17:12

## 2017-01-01 RX ADMIN — POTASSIUM CHLORIDE 20 MEQ: 29.8 INJECTION, SOLUTION INTRAVENOUS at 05:45

## 2017-01-01 RX ADMIN — Medication 10 ML: at 18:24

## 2017-01-01 RX ADMIN — POTASSIUM CHLORIDE 40 MEQ: 750 CAPSULE, EXTENDED RELEASE ORAL at 05:56

## 2017-01-01 RX ADMIN — MORPHINE SULFATE 2 MG: 4 INJECTION, SOLUTION INTRAMUSCULAR; INTRAVENOUS at 14:01

## 2017-01-01 RX ADMIN — CYCLOBENZAPRINE HYDROCHLORIDE 10 MG: 10 TABLET, FILM COATED ORAL at 09:01

## 2017-01-01 RX ADMIN — SODIUM CHLORIDE TAB 1 GM 1 G: 1 TAB at 08:56

## 2017-01-01 RX ADMIN — DOCUSATE SODIUM 100 MG: 100 CAPSULE, LIQUID FILLED ORAL at 17:31

## 2017-01-01 RX ADMIN — TAZOBACTAM SODIUM AND PIPERACILLIN SODIUM 3.38 G: 375; 3 INJECTION, SOLUTION INTRAVENOUS at 05:45

## 2017-01-01 RX ADMIN — Medication 10 ML: at 10:00

## 2017-01-01 RX ADMIN — BACITRACIN: 500 OINTMENT TOPICAL at 20:43

## 2017-01-01 RX ADMIN — BACITRACIN: 500 OINTMENT TOPICAL at 08:55

## 2017-01-01 RX ADMIN — FUROSEMIDE 20 MG: 10 INJECTION, SOLUTION INTRAVENOUS at 08:36

## 2017-01-01 RX ADMIN — BACITRACIN: 500 OINTMENT TOPICAL at 20:08

## 2017-01-01 RX ADMIN — HEPARIN SODIUM 5000 UNITS: 5000 INJECTION, SOLUTION INTRAVENOUS; SUBCUTANEOUS at 09:57

## 2017-01-01 RX ADMIN — FUROSEMIDE 20 MG: 10 INJECTION, SOLUTION INTRAVENOUS at 16:37

## 2017-01-01 RX ADMIN — SPIRONOLACTONE 25 MG: 25 TABLET ORAL at 09:11

## 2017-01-01 RX ADMIN — Medication 10 ML: at 03:14

## 2017-01-01 RX ADMIN — NICOTINE 1 PATCH: 21 PATCH TRANSDERMAL at 08:55

## 2017-01-01 RX ADMIN — ONDANSETRON 4 MG: 2 INJECTION INTRAMUSCULAR; INTRAVENOUS at 00:54

## 2017-01-01 RX ADMIN — FUROSEMIDE 20 MG: 10 INJECTION, SOLUTION INTRAVENOUS at 20:05

## 2017-01-01 RX ADMIN — PROPRANOLOL HYDROCHLORIDE 20 MG: 20 TABLET ORAL at 16:00

## 2017-01-01 RX ADMIN — HEPARIN SODIUM 5000 UNITS: 5000 INJECTION, SOLUTION INTRAVENOUS; SUBCUTANEOUS at 21:46

## 2017-01-01 RX ADMIN — Medication 10 ML: at 10:41

## 2017-01-01 RX ADMIN — MORPHINE SULFATE 1 MG: 4 INJECTION, SOLUTION INTRAMUSCULAR; INTRAVENOUS at 22:29

## 2017-01-01 RX ADMIN — Medication 2 G: at 17:47

## 2017-01-01 RX ADMIN — ALBUMIN (HUMAN) 25 G: 0.25 INJECTION, SOLUTION INTRAVENOUS at 11:06

## 2017-01-01 RX ADMIN — MORPHINE SULFATE 1 MG: 4 INJECTION, SOLUTION INTRAMUSCULAR; INTRAVENOUS at 18:32

## 2017-01-01 RX ADMIN — FUROSEMIDE 20 MG: 10 INJECTION, SOLUTION INTRAVENOUS at 21:46

## 2017-01-01 RX ADMIN — POTASSIUM CHLORIDE 10 MEQ: 7.46 INJECTION, SOLUTION INTRAVENOUS at 08:45

## 2017-01-01 RX ADMIN — NICOTINE 1 PATCH: 21 PATCH TRANSDERMAL at 09:22

## 2017-01-01 RX ADMIN — MORPHINE SULFATE 1 MG: 4 INJECTION, SOLUTION INTRAMUSCULAR; INTRAVENOUS at 00:05

## 2017-01-01 RX ADMIN — PROPRANOLOL HYDROCHLORIDE 20 MG: 20 TABLET ORAL at 06:29

## 2017-01-01 RX ADMIN — ALBUMIN (HUMAN) 25 G: 0.25 INJECTION, SOLUTION INTRAVENOUS at 18:42

## 2017-01-01 RX ADMIN — POTASSIUM CHLORIDE 20 MEQ: 29.8 INJECTION, SOLUTION INTRAVENOUS at 08:53

## 2017-01-01 RX ADMIN — ALBUMIN (HUMAN) 25 G: 0.25 INJECTION, SOLUTION INTRAVENOUS at 21:25

## 2017-01-01 RX ADMIN — PANTOPRAZOLE SODIUM 40 MG: 40 TABLET, DELAYED RELEASE ORAL at 09:57

## 2017-01-01 RX ADMIN — Medication 2 G: at 12:56

## 2017-01-01 RX ADMIN — OCTREOTIDE ACETATE 50 MCG: 50 INJECTION, SOLUTION INTRAVENOUS; SUBCUTANEOUS at 20:12

## 2017-01-01 RX ADMIN — MORPHINE SULFATE 1 MG: 4 INJECTION, SOLUTION INTRAMUSCULAR; INTRAVENOUS at 08:54

## 2017-01-01 RX ADMIN — MAGNESIUM SULFATE HEPTAHYDRATE 1 G: 500 INJECTION, SOLUTION INTRAMUSCULAR; INTRAVENOUS at 18:13

## 2017-01-01 RX ADMIN — ALBUMIN (HUMAN) 12.5 G: 0.25 INJECTION, SOLUTION INTRAVENOUS at 15:19

## 2017-01-01 RX ADMIN — FAMOTIDINE 20 MG: 20 TABLET ORAL at 08:16

## 2017-01-01 RX ADMIN — PANTOPRAZOLE SODIUM 40 MG: 40 TABLET, DELAYED RELEASE ORAL at 08:21

## 2017-01-01 RX ADMIN — FUROSEMIDE 20 MG: 10 INJECTION, SOLUTION INTRAVENOUS at 20:12

## 2017-01-01 RX ADMIN — SPIRONOLACTONE 25 MG: 25 TABLET ORAL at 09:15

## 2017-01-01 RX ADMIN — FUROSEMIDE 40 MG: 10 INJECTION, SOLUTION INTRAMUSCULAR; INTRAVENOUS at 02:40

## 2017-01-01 RX ADMIN — POTASSIUM CHLORIDE 20 MEQ: 29.8 INJECTION, SOLUTION INTRAVENOUS at 10:38

## 2017-01-01 RX ADMIN — SODIUM CHLORIDE 50 ML/HR: 9 INJECTION, SOLUTION INTRAVENOUS at 09:21

## 2017-01-01 RX ADMIN — FUROSEMIDE 20 MG: 10 INJECTION, SOLUTION INTRAVENOUS at 21:49

## 2017-01-01 RX ADMIN — ZOLPIDEM TARTRATE 5 MG: 5 TABLET, FILM COATED ORAL at 23:54

## 2017-01-01 RX ADMIN — Medication 2 G: at 17:29

## 2017-01-01 RX ADMIN — BACITRACIN: 500 OINTMENT TOPICAL at 09:07

## 2017-01-01 RX ADMIN — MAGNESIUM SULFATE HEPTAHYDRATE 1 G: 500 INJECTION, SOLUTION INTRAMUSCULAR; INTRAVENOUS at 17:55

## 2017-01-01 RX ADMIN — MIDODRINE HYDROCHLORIDE 2.5 MG: 2.5 TABLET ORAL at 08:16

## 2017-01-01 RX ADMIN — FUROSEMIDE 20 MG: 10 INJECTION, SOLUTION INTRAVENOUS at 09:04

## 2017-01-01 RX ADMIN — CYCLOBENZAPRINE HYDROCHLORIDE 10 MG: 10 TABLET, FILM COATED ORAL at 09:19

## 2017-01-01 RX ADMIN — MORPHINE SULFATE 1 MG: 4 INJECTION, SOLUTION INTRAMUSCULAR; INTRAVENOUS at 07:31

## 2017-01-01 RX ADMIN — CYCLOBENZAPRINE HYDROCHLORIDE 10 MG: 10 TABLET, FILM COATED ORAL at 17:49

## 2017-01-01 RX ADMIN — PANTOPRAZOLE SODIUM 40 MG: 40 TABLET, DELAYED RELEASE ORAL at 06:28

## 2017-01-01 RX ADMIN — CYCLOBENZAPRINE HYDROCHLORIDE 10 MG: 10 TABLET, FILM COATED ORAL at 17:34

## 2017-01-01 RX ADMIN — CYCLOBENZAPRINE HYDROCHLORIDE 10 MG: 10 TABLET, FILM COATED ORAL at 17:31

## 2017-01-01 RX ADMIN — POTASSIUM CHLORIDE 10 MEQ: 7.46 INJECTION, SOLUTION INTRAVENOUS at 12:19

## 2017-01-01 RX ADMIN — LORAZEPAM 0.5 MG: 2 INJECTION, SOLUTION INTRAMUSCULAR; INTRAVENOUS at 18:10

## 2017-01-01 RX ADMIN — BACITRACIN: 500 OINTMENT TOPICAL at 20:02

## 2017-01-01 RX ADMIN — LORAZEPAM 1 MG: 2 INJECTION INTRAMUSCULAR; INTRAVENOUS at 15:02

## 2017-01-01 RX ADMIN — MORPHINE SULFATE 1 MG: 4 INJECTION, SOLUTION INTRAMUSCULAR; INTRAVENOUS at 22:35

## 2017-01-01 RX ADMIN — LORAZEPAM 0.5 MG: 2 INJECTION INTRAMUSCULAR; INTRAVENOUS at 11:30

## 2017-01-01 RX ADMIN — DOCUSATE SODIUM 100 MG: 100 CAPSULE, LIQUID FILLED ORAL at 17:49

## 2017-01-01 RX ADMIN — SODIUM CHLORIDE TAB 1 GM 1 G: 1 TAB at 17:31

## 2017-01-01 RX ADMIN — MORPHINE SULFATE 1 MG: 2 INJECTION, SOLUTION INTRAMUSCULAR; INTRAVENOUS at 10:11

## 2017-01-01 RX ADMIN — FUROSEMIDE 20 MG: 10 INJECTION, SOLUTION INTRAVENOUS at 21:55

## 2017-01-01 RX ADMIN — POTASSIUM CHLORIDE 10 MEQ: 7.46 INJECTION, SOLUTION INTRAVENOUS at 07:53

## 2017-01-01 RX ADMIN — MAGNESIUM SULFATE HEPTAHYDRATE 1 G: 500 INJECTION, SOLUTION INTRAMUSCULAR; INTRAVENOUS at 08:45

## 2017-01-01 RX ADMIN — SODIUM CHLORIDE 500 ML: 9 INJECTION, SOLUTION INTRAVENOUS at 00:32

## 2017-01-01 RX ADMIN — NICOTINE 1 PATCH: 21 PATCH TRANSDERMAL at 08:38

## 2017-01-01 RX ADMIN — FAMOTIDINE 20 MG: 20 TABLET ORAL at 09:17

## 2017-01-01 RX ADMIN — LORAZEPAM 0.5 MG: 2 INJECTION INTRAMUSCULAR; INTRAVENOUS at 12:38

## 2017-01-01 RX ADMIN — ONDANSETRON 4 MG: 2 INJECTION INTRAMUSCULAR; INTRAVENOUS at 10:06

## 2017-01-01 RX ADMIN — OCTREOTIDE ACETATE 50 MCG: 50 INJECTION, SOLUTION INTRAVENOUS; SUBCUTANEOUS at 10:00

## 2017-01-01 RX ADMIN — FAMOTIDINE 20 MG: 20 TABLET ORAL at 09:59

## 2017-01-01 RX ADMIN — MAGNESIUM SULFATE HEPTAHYDRATE 1 G: 1 INJECTION, SOLUTION INTRAVENOUS at 12:04

## 2017-01-01 RX ADMIN — LORAZEPAM 1 MG: 2 INJECTION INTRAMUSCULAR; INTRAVENOUS at 14:01

## 2017-01-01 RX ADMIN — POTASSIUM CHLORIDE 40 MEQ: 750 CAPSULE, EXTENDED RELEASE ORAL at 17:55

## 2017-01-01 RX ADMIN — MORPHINE SULFATE 1 MG: 4 INJECTION, SOLUTION INTRAMUSCULAR; INTRAVENOUS at 10:14

## 2017-01-01 RX ADMIN — MORPHINE SULFATE 1 MG: 2 INJECTION, SOLUTION INTRAMUSCULAR; INTRAVENOUS at 10:54

## 2017-01-01 RX ADMIN — FUROSEMIDE 20 MG: 10 INJECTION, SOLUTION INTRAVENOUS at 09:16

## 2017-01-01 RX ADMIN — POTASSIUM CHLORIDE 20 MEQ: 29.8 INJECTION, SOLUTION INTRAVENOUS at 08:30

## 2017-01-01 RX ADMIN — MORPHINE SULFATE 1 MG: 2 INJECTION, SOLUTION INTRAMUSCULAR; INTRAVENOUS at 06:40

## 2017-01-01 RX ADMIN — MAGNESIUM SULFATE HEPTAHYDRATE 1 G: 1 INJECTION, SOLUTION INTRAVENOUS at 18:28

## 2017-01-01 RX ADMIN — SODIUM CHLORIDE 100 ML/HR: 900 INJECTION, SOLUTION INTRAVENOUS at 09:57

## 2017-01-01 RX ADMIN — POTASSIUM CHLORIDE 10 MEQ: 7.46 INJECTION, SOLUTION INTRAVENOUS at 11:09

## 2017-01-01 RX ADMIN — SCOPALAMINE 1 PATCH: 1 PATCH, EXTENDED RELEASE TRANSDERMAL at 12:47

## 2017-01-01 RX ADMIN — CYCLOBENZAPRINE HYDROCHLORIDE 10 MG: 10 TABLET, FILM COATED ORAL at 08:54

## 2017-01-01 RX ADMIN — FAMOTIDINE 20 MG: 20 TABLET ORAL at 17:31

## 2017-01-01 RX ADMIN — PANTOPRAZOLE SODIUM 40 MG: 40 TABLET, DELAYED RELEASE ORAL at 06:04

## 2017-01-01 RX ADMIN — OCTREOTIDE ACETATE 50 MCG: 50 INJECTION, SOLUTION INTRAVENOUS; SUBCUTANEOUS at 15:33

## 2017-01-01 RX ADMIN — DOCUSATE SODIUM 100 MG: 100 CAPSULE, LIQUID FILLED ORAL at 17:34

## 2017-01-01 RX ADMIN — FAMOTIDINE 20 MG: 20 TABLET ORAL at 18:28

## 2017-01-01 RX ADMIN — SODIUM CHLORIDE 25 ML/HR: 3 INJECTION, SOLUTION INTRAVENOUS at 19:13

## 2017-01-01 RX ADMIN — CYCLOBENZAPRINE HYDROCHLORIDE 10 MG: 10 TABLET, FILM COATED ORAL at 18:28

## 2017-01-01 RX ADMIN — NICOTINE 1 PATCH: 21 PATCH TRANSDERMAL at 09:29

## 2017-01-01 RX ADMIN — MAGNESIUM SULFATE HEPTAHYDRATE 1 G: 500 INJECTION, SOLUTION INTRAMUSCULAR; INTRAVENOUS at 09:02

## 2017-01-01 RX ADMIN — PANTOPRAZOLE SODIUM 40 MG: 40 TABLET, DELAYED RELEASE ORAL at 06:29

## 2017-01-01 RX ADMIN — MORPHINE SULFATE 2 MG: 4 INJECTION, SOLUTION INTRAMUSCULAR; INTRAVENOUS at 16:52

## 2017-01-01 RX ADMIN — TAZOBACTAM SODIUM AND PIPERACILLIN SODIUM 3.38 G: 375; 3 INJECTION, SOLUTION INTRAVENOUS at 12:16

## 2017-01-01 RX ADMIN — SODIUM CHLORIDE 25 ML/HR: 3 INJECTION, SOLUTION INTRAVENOUS at 18:15

## 2017-01-01 RX ADMIN — MIDODRINE HYDROCHLORIDE 10 MG: 5 TABLET ORAL at 10:40

## 2017-01-01 RX ADMIN — ALBUMIN (HUMAN) 25 G: 0.25 INJECTION, SOLUTION INTRAVENOUS at 08:30

## 2017-01-01 RX ADMIN — ALBUMIN (HUMAN) 25 G: 0.25 INJECTION, SOLUTION INTRAVENOUS at 20:43

## 2017-01-01 RX ADMIN — Medication 2 G: at 12:19

## 2017-01-01 RX ADMIN — POTASSIUM CHLORIDE 10 MEQ: 7.46 INJECTION, SOLUTION INTRAVENOUS at 14:45

## 2017-01-01 RX ADMIN — MORPHINE SULFATE 1 MG: 4 INJECTION, SOLUTION INTRAMUSCULAR; INTRAVENOUS at 21:03

## 2017-01-01 RX ADMIN — CYCLOBENZAPRINE HYDROCHLORIDE 10 MG: 10 TABLET, FILM COATED ORAL at 17:47

## 2017-01-01 RX ADMIN — MORPHINE SULFATE 1 MG: 2 INJECTION, SOLUTION INTRAMUSCULAR; INTRAVENOUS at 18:41

## 2017-01-01 RX ADMIN — OCTREOTIDE ACETATE 50 MCG: 50 INJECTION, SOLUTION INTRAVENOUS; SUBCUTANEOUS at 09:59

## 2017-01-01 RX ADMIN — PROPRANOLOL HYDROCHLORIDE 20 MG: 20 TABLET ORAL at 22:54

## 2017-01-01 RX ADMIN — FAMOTIDINE 20 MG: 20 TABLET ORAL at 17:47

## 2017-01-01 RX ADMIN — MIDODRINE HYDROCHLORIDE 2.5 MG: 2.5 TABLET ORAL at 06:48

## 2017-01-01 RX ADMIN — OCTREOTIDE ACETATE 50 MCG: 50 INJECTION, SOLUTION INTRAVENOUS; SUBCUTANEOUS at 21:49

## 2017-01-01 RX ADMIN — BACITRACIN: 500 OINTMENT TOPICAL at 09:19

## 2017-01-01 RX ADMIN — FAMOTIDINE 20 MG: 20 TABLET ORAL at 17:49

## 2017-01-01 RX ADMIN — PANTOPRAZOLE SODIUM 40 MG: 40 TABLET, DELAYED RELEASE ORAL at 06:48

## 2017-01-01 RX ADMIN — IOPAMIDOL 60 ML: 755 INJECTION, SOLUTION INTRAVENOUS at 16:30

## 2017-01-01 RX ADMIN — PROPRANOLOL HYDROCHLORIDE 20 MG: 20 TABLET ORAL at 06:04

## 2017-01-01 RX ADMIN — OCTREOTIDE ACETATE 50 MCG: 50 INJECTION, SOLUTION INTRAVENOUS; SUBCUTANEOUS at 08:51

## 2017-01-01 RX ADMIN — ONDANSETRON 4 MG: 2 INJECTION INTRAMUSCULAR; INTRAVENOUS at 15:32

## 2017-01-01 RX ADMIN — LORAZEPAM 0.5 MG: 2 INJECTION INTRAMUSCULAR; INTRAVENOUS at 03:11

## 2017-01-01 RX ADMIN — FUROSEMIDE 20 MG: 10 INJECTION, SOLUTION INTRAVENOUS at 21:26

## 2017-01-01 RX ADMIN — FUROSEMIDE 20 MG: 10 INJECTION, SOLUTION INTRAVENOUS at 08:56

## 2017-01-01 RX ADMIN — ONDANSETRON 4 MG: 2 INJECTION INTRAMUSCULAR; INTRAVENOUS at 00:34

## 2017-01-01 RX ADMIN — MIDODRINE HYDROCHLORIDE 10 MG: 5 TABLET ORAL at 12:19

## 2017-01-01 RX ADMIN — FUROSEMIDE 20 MG: 10 INJECTION, SOLUTION INTRAVENOUS at 10:00

## 2017-01-01 RX ADMIN — PROPRANOLOL HYDROCHLORIDE 20 MG: 20 TABLET ORAL at 17:18

## 2017-01-01 RX ADMIN — Medication 10 ML: at 09:26

## 2017-01-01 RX ADMIN — POTASSIUM CHLORIDE 20 MEQ: 29.8 INJECTION, SOLUTION INTRAVENOUS at 07:30

## 2017-01-01 RX ADMIN — LORAZEPAM 1 MG: 2 INJECTION INTRAMUSCULAR; INTRAVENOUS at 22:22

## 2017-01-01 RX ADMIN — MORPHINE SULFATE 1 MG: 4 INJECTION, SOLUTION INTRAMUSCULAR; INTRAVENOUS at 04:01

## 2017-01-01 RX ADMIN — ZOLPIDEM TARTRATE 5 MG: 5 TABLET, FILM COATED ORAL at 21:33

## 2017-01-01 RX ADMIN — OCTREOTIDE ACETATE 50 MCG: 50 INJECTION, SOLUTION INTRAVENOUS; SUBCUTANEOUS at 21:02

## 2017-01-01 RX ADMIN — NICOTINE 1 PATCH: 21 PATCH TRANSDERMAL at 09:15

## 2017-01-01 RX ADMIN — MIDODRINE HYDROCHLORIDE 2.5 MG: 2.5 TABLET ORAL at 12:18

## 2017-01-01 RX ADMIN — FUROSEMIDE 20 MG: 10 INJECTION, SOLUTION INTRAVENOUS at 21:01

## 2017-01-01 RX ADMIN — MORPHINE SULFATE 1 MG: 4 INJECTION, SOLUTION INTRAMUSCULAR; INTRAVENOUS at 03:42

## 2017-01-01 RX ADMIN — FUROSEMIDE 20 MG: 10 INJECTION, SOLUTION INTRAVENOUS at 23:54

## 2017-01-01 RX ADMIN — MORPHINE SULFATE 1 MG: 2 INJECTION, SOLUTION INTRAMUSCULAR; INTRAVENOUS at 02:31

## 2017-01-01 RX ADMIN — MAGNESIUM SULFATE HEPTAHYDRATE 1 G: 500 INJECTION, SOLUTION INTRAMUSCULAR; INTRAVENOUS at 12:50

## 2017-01-01 RX ADMIN — Medication 10 ML: at 20:10

## 2017-01-01 RX ADMIN — Medication 10 ML: at 18:50

## 2017-01-01 RX ADMIN — POTASSIUM CHLORIDE 10 MEQ: 7.46 INJECTION, SOLUTION INTRAVENOUS at 13:15

## 2017-01-01 RX ADMIN — CYCLOBENZAPRINE HYDROCHLORIDE 10 MG: 10 TABLET, FILM COATED ORAL at 09:56

## 2017-01-01 RX ADMIN — DOCUSATE SODIUM 100 MG: 100 CAPSULE, LIQUID FILLED ORAL at 09:22

## 2017-01-01 RX ADMIN — DOCUSATE SODIUM 100 MG: 100 CAPSULE, LIQUID FILLED ORAL at 09:01

## 2017-01-01 RX ADMIN — CYCLOBENZAPRINE HYDROCHLORIDE 10 MG: 10 TABLET, FILM COATED ORAL at 17:30

## 2017-01-01 RX ADMIN — PANTOPRAZOLE SODIUM 40 MG: 40 TABLET, DELAYED RELEASE ORAL at 06:32

## 2017-01-01 RX ADMIN — MORPHINE SULFATE 1 MG: 4 INJECTION, SOLUTION INTRAMUSCULAR; INTRAVENOUS at 11:31

## 2017-01-01 RX ADMIN — FUROSEMIDE 20 MG: 10 INJECTION, SOLUTION INTRAVENOUS at 15:32

## 2017-01-01 RX ADMIN — FUROSEMIDE 20 MG: 10 INJECTION, SOLUTION INTRAVENOUS at 09:58

## 2017-01-01 RX ADMIN — MIDODRINE HYDROCHLORIDE 2.5 MG: 2.5 TABLET ORAL at 17:55

## 2017-01-01 RX ADMIN — ALBUMIN HUMAN 250 ML: 0.05 INJECTION, SOLUTION INTRAVENOUS at 17:53

## 2017-01-01 RX ADMIN — VENLAFAXINE HYDROCHLORIDE 75 MG: 75 TABLET ORAL at 06:31

## 2017-01-01 RX ADMIN — Medication 10 ML: at 15:02

## 2017-01-01 RX ADMIN — MIDODRINE HYDROCHLORIDE 10 MG: 5 TABLET ORAL at 17:47

## 2017-01-01 RX ADMIN — FUROSEMIDE 20 MG: 10 INJECTION, SOLUTION INTRAVENOUS at 08:54

## 2017-01-01 RX ADMIN — Medication 2 G: at 09:16

## 2017-01-01 RX ADMIN — PROPRANOLOL HYDROCHLORIDE 20 MG: 20 TABLET ORAL at 06:28

## 2017-01-01 RX ADMIN — DEXTROSE MONOHYDRATE 50 ML: 500 INJECTION PARENTERAL at 00:34

## 2017-01-01 RX ADMIN — LEVOFLOXACIN 500 MG: 5 INJECTION, SOLUTION INTRAVENOUS at 06:37

## 2017-01-01 RX ADMIN — CYCLOBENZAPRINE HYDROCHLORIDE 10 MG: 10 TABLET, FILM COATED ORAL at 18:05

## 2017-01-01 RX ADMIN — CYCLOBENZAPRINE HYDROCHLORIDE 10 MG: 10 TABLET, FILM COATED ORAL at 08:35

## 2017-01-01 RX ADMIN — Medication 10 ML: at 03:15

## 2017-01-01 RX ADMIN — MIDODRINE HYDROCHLORIDE 2.5 MG: 2.5 TABLET ORAL at 18:04

## 2017-01-01 RX ADMIN — NICOTINE 1 PATCH: 21 PATCH TRANSDERMAL at 08:56

## 2017-01-01 RX ADMIN — ONDANSETRON 4 MG: 2 INJECTION INTRAMUSCULAR; INTRAVENOUS at 18:45

## 2017-01-01 RX ADMIN — PHENYLEPHRINE HYDROCHLORIDE 0.2 MCG/KG/MIN: 10 INJECTION INTRAVENOUS at 05:27

## 2017-01-01 RX ADMIN — VANCOMYCIN HYDROCHLORIDE 1000 MG: 1 INJECTION, POWDER, LYOPHILIZED, FOR SOLUTION INTRAVENOUS at 07:30

## 2017-01-01 RX ADMIN — POTASSIUM CHLORIDE 10 MEQ: 7.46 INJECTION, SOLUTION INTRAVENOUS at 10:45

## 2017-01-01 RX ADMIN — CYCLOBENZAPRINE HYDROCHLORIDE 10 MG: 10 TABLET, FILM COATED ORAL at 09:57

## 2017-01-01 RX ADMIN — ONDANSETRON 4 MG: 2 INJECTION INTRAMUSCULAR; INTRAVENOUS at 18:38

## 2017-01-01 RX ADMIN — SODIUM CHLORIDE TAB 1 GM 1 G: 1 TAB at 09:19

## 2017-01-01 RX ADMIN — DOCUSATE SODIUM 100 MG: 100 CAPSULE, LIQUID FILLED ORAL at 17:55

## 2017-01-01 RX ADMIN — SPIRONOLACTONE 25 MG: 25 TABLET ORAL at 08:54

## 2017-01-01 RX ADMIN — FAMOTIDINE 20 MG: 20 TABLET ORAL at 08:36

## 2017-01-01 RX ADMIN — SODIUM CHLORIDE 25 ML/HR: 3 INJECTION, SOLUTION INTRAVENOUS at 01:04

## 2017-01-01 RX ADMIN — LOPERAMIDE HYDROCHLORIDE 2 MG: 2 CAPSULE ORAL at 17:30

## 2017-01-01 RX ADMIN — Medication 10 ML: at 12:40

## 2017-01-01 RX ADMIN — Medication 10 ML: at 20:07

## 2017-01-01 RX ADMIN — FUROSEMIDE 20 MG: 10 INJECTION, SOLUTION INTRAVENOUS at 20:02

## 2017-01-01 RX ADMIN — MIDODRINE HYDROCHLORIDE 2.5 MG: 2.5 TABLET ORAL at 09:58

## 2017-01-01 RX ADMIN — SODIUM CHLORIDE TAB 1 GM 1 G: 1 TAB at 17:30

## 2017-01-01 RX ADMIN — MORPHINE SULFATE 1 MG: 4 INJECTION, SOLUTION INTRAMUSCULAR; INTRAVENOUS at 13:57

## 2017-01-01 RX ADMIN — VENLAFAXINE HYDROCHLORIDE 75 MG: 75 TABLET ORAL at 08:15

## 2017-01-01 RX ADMIN — MIDODRINE HYDROCHLORIDE 10 MG: 5 TABLET ORAL at 09:17

## 2017-01-01 RX ADMIN — Medication 2 G: at 10:40

## 2017-01-01 RX ADMIN — OCTREOTIDE ACETATE 50 MCG: 50 INJECTION, SOLUTION INTRAVENOUS; SUBCUTANEOUS at 17:30

## 2017-01-01 RX ADMIN — POTASSIUM CHLORIDE 40 MEQ: 750 CAPSULE, EXTENDED RELEASE ORAL at 09:57

## 2017-01-01 RX ADMIN — FAMOTIDINE 20 MG: 10 INJECTION, SOLUTION INTRAVENOUS at 08:30

## 2017-01-01 RX ADMIN — FAMOTIDINE 20 MG: 20 TABLET ORAL at 09:19

## 2017-01-01 RX ADMIN — FAMOTIDINE 20 MG: 20 TABLET ORAL at 17:29

## 2017-01-01 RX ADMIN — SODIUM CHLORIDE 100 ML/HR: 900 INJECTION, SOLUTION INTRAVENOUS at 02:22

## 2017-01-01 RX ADMIN — SODIUM CHLORIDE 20 ML/HR: 3 INJECTION, SOLUTION INTRAVENOUS at 17:30

## 2017-01-01 RX ADMIN — ZOLPIDEM TARTRATE 5 MG: 5 TABLET, FILM COATED ORAL at 19:22

## 2017-01-01 RX ADMIN — MAGNESIUM SULFATE HEPTAHYDRATE 1 G: 1 INJECTION, SOLUTION INTRAVENOUS at 10:00

## 2017-01-01 RX ADMIN — OCTREOTIDE ACETATE 50 MCG: 50 INJECTION, SOLUTION INTRAVENOUS; SUBCUTANEOUS at 16:00

## 2017-01-01 RX ADMIN — CYCLOBENZAPRINE HYDROCHLORIDE 10 MG: 10 TABLET, FILM COATED ORAL at 09:00

## 2017-01-01 RX ADMIN — CYCLOBENZAPRINE HYDROCHLORIDE 10 MG: 10 TABLET, FILM COATED ORAL at 18:12

## 2017-01-01 RX ADMIN — ALBUMIN (HUMAN) 25 G: 0.25 INJECTION, SOLUTION INTRAVENOUS at 08:15

## 2017-01-01 RX ADMIN — NICOTINE 1 PATCH: 21 PATCH TRANSDERMAL at 08:35

## 2017-01-01 RX ADMIN — PANTOPRAZOLE SODIUM 40 MG: 40 TABLET, DELAYED RELEASE ORAL at 06:17

## 2017-01-01 RX ADMIN — VENLAFAXINE 25 MG: 25 TABLET ORAL at 06:28

## 2017-01-01 RX ADMIN — BACITRACIN: 500 OINTMENT TOPICAL at 08:36

## 2017-01-01 RX ADMIN — SODIUM CHLORIDE 30 ML/HR: 3 INJECTION, SOLUTION INTRAVENOUS at 15:52

## 2017-01-01 RX ADMIN — PROPRANOLOL HYDROCHLORIDE 20 MG: 20 TABLET ORAL at 10:03

## 2017-01-01 RX ADMIN — PANTOPRAZOLE SODIUM 40 MG: 40 TABLET, DELAYED RELEASE ORAL at 09:11

## 2017-01-01 RX ADMIN — MORPHINE SULFATE 1 MG: 2 INJECTION, SOLUTION INTRAMUSCULAR; INTRAVENOUS at 05:47

## 2017-01-17 PROBLEM — G89.29 CHRONIC PELVIC PAIN IN FEMALE: Status: ACTIVE | Noted: 2017-01-01

## 2017-01-17 PROBLEM — R10.2 PELVIC PAIN: Status: ACTIVE | Noted: 2017-01-01

## 2017-01-17 PROBLEM — M54.50 CHRONIC MIDLINE LOW BACK PAIN: Status: ACTIVE | Noted: 2017-01-01

## 2017-01-17 PROBLEM — M79.18 MYOFACIAL MUSCLE PAIN: Status: ACTIVE | Noted: 2017-01-01

## 2017-01-17 PROBLEM — G89.29 CHRONIC MIDLINE LOW BACK PAIN: Status: ACTIVE | Noted: 2017-01-01

## 2017-01-17 PROBLEM — R10.2 CHRONIC PELVIC PAIN IN FEMALE: Status: ACTIVE | Noted: 2017-01-01

## 2017-01-17 PROBLEM — Z87.828 HISTORY OF MOTOR VEHICLE ACCIDENT: Status: ACTIVE | Noted: 2017-01-01
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
## 2017-01-17 NOTE — MR AVS SNAPSHOT
Joseline Parnell   1/17/2017 8:30 AM   Office Visit    Dept Phone:  [phone]   Encounter #:  [id number]    Provider:  Nawaf Machado MD   Department:  [facility]                Your Full Care Plan              Today's Medication Changes          These changes are accurate as of: 1/17/17 10:42 AM.  If you have any questions, ask your nurse or doctor.               Stop taking medication(s)listed here:     azithromycin 250 MG tablet   Commonly known as:  ZITHROMAX   Stopped by:  Nawaf Machado MD           methylPREDNISolone 4 MG tablet   Commonly known as:  MEDROL   Stopped by:  Nawaf Machado MD                      Your Updated Medication List          This list is accurate as of: 1/17/17 10:42 AM.  Always use your most recent med list.                Cetirizine HCl 10 MG tablet dispersible       CLARITIN-D 12 HOUR 5-120 MG per 12 hr tablet   Generic drug:  loratadine-pseudoephedrine       docusate sodium 100 MG capsule   Commonly known as:  COLACE       fluticasone 50 MCG/ACT nasal spray   Commonly known as:  FLONASE       ibuprofen 800 MG tablet   Commonly known as:  ADVIL,MOTRIN       meloxicam 15 MG tablet   Commonly known as:  MOBIC       methocarbamol 500 MG tablet   Commonly known as:  ROBAXIN       mirtazapine 15 MG tablet   Commonly known as:  REMERON       venlafaxine 75 MG tablet   Commonly known as:  EFFEXOR               You Were Diagnosed With        Codes Comments    Chronic midline low back pain, with sciatica presence unspecified    -  Primary ICD-10-CM: M54.5, G89.29  ICD-9-CM: 724.2, 338.29     Pelvic pain     ICD-10-CM: R10.2  ICD-9-CM: HHC6979     Myofacial muscle pain     ICD-10-CM: M79.1  ICD-9-CM: 729.1       Instructions     None    Patient Instructions History      Upcoming Appointments     Visit Type Date Time Department    NEW PATIENT 1/17/2017  8:30 AM [facility]    FOLLOW UP 1/31/2017  9:30 AM [facility]  "[facility]      [employer] Signup     [facility] [employer] allows you to send messages to your doctor, view your test results, renew your prescriptions, schedule appointments, and more. To sign up, go to [employer] and click on the Sign Up Now link in the New User? box. Enter your [employer] Activation Code exactly as it appears below along with the last four digits of your Social Security Number and your Date of Birth () to complete the sign-up process. If you do not sign up before the expiration date, you must request a new code.    Covarity Activation Code: [email or web address]  Expires: 2017 10:42 AM    If you have questions, you can email [email or web address] or call [phone] to talk to our [employer] staff. Remember, Covarity is NOT to be used for urgent needs. For medical emergencies, dial 911.               Other Info from Your Visit           Your Appointments     2017  9:30 AM CST   Follow Up with ERASMO Shah   [facility] (--)    [address]  [facility] [address] [place] [id number] [phone]           Arrive 15 minutes prior to appointment.              Allergies     Lexapro [Escitalopram Oxalate]  Itching, Rash      Reason for Visit     Back Pain lower    Neck Pain     Leg Pain radiating down right      Vital Signs     Blood Pressure Height Weight Body Mass Index Smoking Status       100/54 (BP Location: Right arm, Patient Position: Sitting, Cuff Size: Adult) 65\" (165.1 cm) 117 lb 14.4 oz (53.5 kg) 19.62 kg/m2 Current Every Day Smoker       Problems and Diagnoses Noted     Chronic midline low back pain    Muscle pain    Pelvic pain        "

## 2017-01-20 NOTE — PROGRESS NOTES
Addendum:  I have reviewed this patient with ERASMO uLcero.  I agree with the above findings and plan.

## 2017-02-20 NOTE — PROGRESS NOTES
Joseline Parnell is a 36 y.o. female.   1980    HPI:   Location: neck, lower back and right leg  Quality: aching, sharp and dull  Severity: 10/10  Timing: constant  Alleviating: taking pressure off body part by using pillows  Aggravating: weather and putting too much pressure on body parts      Mobic and flexeril not helping. Would like something else.  Still seeing Dr. Willard for migraines and syncope, undergoing workup.      The following portions of the patient's history were reviewed by me and updated as appropriate: allergies, current medications, past family history, past medical history, past social history, past surgical history and problem list.    Past Medical History   Diagnosis Date   • Abscess of buttock    • Acute bronchitis    • Acute maxillary sinusitis    • Acute otitis media    • Acute sinusitis    • Astigmatism    • Blister of vulva with infection    • Calf pain    • Cellulitis       both axilla   • Cough    • Cyst of ovary      history   • Diabetes mellitus screening    • Encounter for gynecological examination    • External hemorrhoids    • Hemorrhoids    • Hip pain    • Knee pain    • Migraine    • Nystagmus    • Screening for hyperlipidemia    • Seborrheic dermatitis      Left ear   • Serous otitis media    • Streptococcal sore throat    • Temporomandibular joint disorder    • Thyroid disorder screening    • Venereal disease screening        Social History     Social History   • Marital status: Single     Spouse name: N/A   • Number of children: N/A   • Years of education: N/A     Occupational History   • Not on file.     Social History Main Topics   • Smoking status: Current Every Day Smoker     Packs/day: 0.25     Types: Cigarettes   • Smokeless tobacco: Not on file      Comment: 1/4 ppd  (3/12/15)   • Alcohol use No   • Drug use: No   • Sexual activity: Defer     Other Topics Concern   • Not on file     Social History Narrative       Family History   Problem Relation Age of Onset   •  Nystagmus Mother    • Blindness Mother    • Asthma Mother    • Hypertension Mother    • Nystagmus Other    • Cancer Father    • Alcohol abuse Paternal Uncle          Current Outpatient Prescriptions:   •  cetirizine (zyrTEC) 10 MG tablet, , Disp: , Rfl: 0  •  docusate sodium (COLACE) 100 MG capsule, Take 100 mg by mouth 2 (Two) Times a Day. FANNIE NGUYEN, Disp: , Rfl:   •  escitalopram (LEXAPRO) 10 MG tablet, Take 10 mg by mouth Daily., Disp: , Rfl:   •  fluticasone (FLONASE) 50 MCG/ACT nasal spray, into each nostril Daily., Disp: , Rfl:   •  meloxicam (MOBIC) 15 MG tablet, take 1 tablet by mouth once daily, Disp: , Rfl: 0  •  mirtazapine (REMERON) 15 MG tablet, Take 15 mg by mouth Every Night., Disp: , Rfl:   •  PATIENT SUPPLIED MEDICATION, Take 1,000 Units by mouth Daily. OTC Vitamin D3 1000 units once daily, Disp: , Rfl:   •  venlafaxine (EFFEXOR) 75 MG tablet, Take 75 mg by mouth Every Morning., Disp: , Rfl:   •  cyclobenzaprine (FLEXERIL) 10 MG tablet, , Disp: , Rfl: 0  •  diclofenac (VOLTAREN) 75 MG EC tablet, Take 1 tablet by mouth 2 (Two) Times a Day for 30 days., Disp: 60 tablet, Rfl: 1  •  ibuprofen (ADVIL,MOTRIN) 800 MG tablet, , Disp: , Rfl: 0  •  tiZANidine (ZANAFLEX) 2 MG tablet, Take 2 tablets by mouth 2 (Two) Times a Day for 30 days., Disp: 60 tablet, Rfl: 1    Allergies   Allergen Reactions   • Lexapro [Escitalopram Oxalate] Itching and Rash         Review of Systems   Musculoskeletal: Positive for back pain (lower) and neck pain.        Right leg pain     10 system review of systems was reviewed and negative except for above.    Physical Exam   Constitutional: She appears well-developed and well-nourished. No distress.   Musculoskeletal:        Cervical back: She exhibits decreased range of motion (full flexion, ext limted.  ), tenderness and pain.        Lumbar back: She exhibits decreased range of motion (45 deg flexion and 10 deg ext.), tenderness and pain.   Neurological: She is alert.    Psychiatric: She has a normal mood and affect. Her behavior is normal. Judgment normal.       Joseline was seen today for back pain, neck pain and leg pain.    Diagnoses and all orders for this visit:    Myofascial pain    Pelvic pain    Chronic midline low back pain, with sciatica presence unspecified    Other orders  -     tiZANidine (ZANAFLEX) 2 MG tablet; Take 2 tablets by mouth 2 (Two) Times a Day for 30 days.  -     diclofenac (VOLTAREN) 75 MG EC tablet; Take 1 tablet by mouth 2 (Two) Times a Day for 30 days.      Medication: zanaflex and diclofenac.    Interventional: none at this time    Rehab: none at this time    Behavioral: No aberrant behavior noted. BRI Report #[id number]  was reviewed and is consistent with stated history    Urine drug screen None at this time          This document has been electronically signed by Nawaf Machado MD on February 20, 2017 10:24 AM

## 2017-07-18 NOTE — PATIENT INSTRUCTIONS
Cirrhosis  Cirrhosis is long-term (chronic) liver injury. The liver is your largest internal organ, and it performs many functions. The liver converts food into energy, removes toxic material from your blood, makes important proteins, and absorbs necessary vitamins from your diet.  If you have cirrhosis, it means many of your healthy liver cells have been replaced by scar tissue. This prevents blood from flowing through your liver, which makes it difficult for your liver to function. This scarring is not reversible, but treatment can prevent it from getting worse.   CAUSES   Hepatitis C and long-term alcohol abuse are the most common causes of cirrhosis. Other causes include:  · Nonalcoholic fatty liver disease.  · Hepatitis B infection.  · Autoimmune hepatitis.  · Diseases that cause blockage of ducts inside the liver.  · Inherited liver diseases.  · Reactions to certain long-term medicines.  · Parasitic infections.  · Long-term exposure to certain toxins.  RISK FACTORS  You may have a higher risk of cirrhosis if you:  · Have certain hepatitis viruses.  · Abuse alcohol, especially if you are female.  · Are overweight.  · Share needles.  · Have unprotected sex with someone who has hepatitis.  SYMPTOMS   You may not have any signs and symptoms at first. Symptoms may not develop until the damage to your liver starts to get worse. Signs and symptoms of cirrhosis may include:   · Tenderness in the right-upper part of your abdomen.  · Weakness and tiredness (fatigue).  · Loss of appetite.  · Nausea.  · Weight loss and muscle loss.  · Itchiness.  · Yellow skin and eyes (jaundice).  · Buildup of fluid in the abdomen (ascites).  · Swelling of the feet and ankles (edema).  · Appearance of tiny blood vessels under the skin.  · Mental confusion.  · Easy bruising and bleeding.  DIAGNOSIS   Your health care provider may suspect cirrhosis based on your symptoms and medical history, especially if you have other medical conditions  or a history of alcohol abuse. Your health care provider will do a physical exam to feel your liver and check for signs of cirrhosis. Your health care provider may perform other tests, including:   · Blood tests to check:      Whether you have hepatitis B or C.      Kidney function.    Liver function.  · Imaging tests such as:    MRI or CT scan to look for changes seen in advanced cirrhosis.    Ultrasound to see if normal liver tissue is being replaced by scar tissue.  · A procedure using a long needle to take a sample of liver tissue (biopsy) for examination under a microscope. Liver biopsy can confirm the diagnosis of cirrhosis.    TREATMENT   Treatment depends on how damaged your liver is and what caused the damage. Treatment may include treating cirrhosis symptoms or treating the underlying causes of the condition to try to slow the progression of the damage. Treatment may include:  · Making lifestyle changes, such as:      Eating a healthy diet.    Restricting salt intake.     Maintaining a healthy weight.      Not abusing drugs or alcohol.  · Taking medicines to:    Treat liver infections or other infections.    Control itching.    Reduce fluid buildup.    Reduce certain blood toxins.    Reduce risk of bleeding from enlarged blood vessels in the stomach or esophagus (varices).  · If varices are causing bleeding problems, you may need treatment with a procedure that ties up the vessels causing them to fall off (band ligation).  · If cirrhosis is causing your liver to fail, your health care provider may recommend a liver transplant.  · Other treatments may be recommended depending on any complications of cirrhosis, such as liver-related kidney failure (hepatorenal syndrome).  HOME CARE INSTRUCTIONS   · Take medicines only as directed by your health care provider. Do not use drugs that are toxic to your liver. Ask your health care provider before taking any new medicines, including over-the-counter medicines.     · Rest as needed.  · Eat a well-balanced diet. Ask your health care provider or dietitian for more information.    · You may have to follow a low-salt diet or restrict your water intake as directed.  · Do not drink alcohol. This is especially important if you are taking acetaminophen.  · Keep all follow-up visits as directed by your health care provider. This is important.  SEEK MEDICAL CARE IF:  · You have fatigue or weakness that is getting worse.  · You develop swelling of the hands, feet, legs, or face.  · You have a fever.  · You develop loss of appetite.  · You have nausea or vomiting.  · You develop jaundice.  · You develop easy bruising or bleeding.  SEEK IMMEDIATE MEDICAL CARE IF:  · You vomit bright red blood or a material that looks like coffee grounds.  · You have blood in your stools.  · Your stools appear black and tarry.  · You become confused.  · You have chest pain or trouble breathing.     This information is not intended to replace advice given to you by your health care provider. Make sure you discuss any questions you have with your health care provider.     Document Released: 12/18/2006 Document Revised: 04/10/2017 Document Reviewed: 08/26/2015  [employer] Interactive Patient Education ©2017 [employer]
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
## 2017-07-19 PROBLEM — K70.31 ALCOHOLIC CIRRHOSIS OF LIVER WITH ASCITES (HCC): Status: ACTIVE | Noted: 2017-01-01

## 2017-07-19 PROBLEM — F33.1 MODERATE EPISODE OF RECURRENT MAJOR DEPRESSIVE DISORDER (HCC): Status: ACTIVE | Noted: 2017-01-01

## 2017-07-19 PROBLEM — Z76.89 ENCOUNTER TO ESTABLISH CARE WITH NEW DOCTOR: Status: ACTIVE | Noted: 2017-01-01

## 2017-07-19 PROBLEM — G47.01 INSOMNIA DUE TO MEDICAL CONDITION: Status: ACTIVE | Noted: 2017-01-01

## 2017-07-19 PROBLEM — G89.29 CHRONIC PAIN: Status: ACTIVE | Noted: 2017-01-01

## 2017-07-19 PROBLEM — K70.31 ASCITES DUE TO ALCOHOLIC CIRRHOSIS (HCC): Status: ACTIVE | Noted: 2017-01-01

## 2017-07-19 PROBLEM — R52 ACUTE PAIN: Status: ACTIVE | Noted: 2017-01-01

## 2017-07-25 NOTE — PROGRESS NOTES
Joseline Parnell is a 37 y.o. female.   1980    HPI:   Location: neck, lower back and left leg  Quality: aching, sharp and dull  Severity: 10/10  Timing: constant  Alleviating: lying down and using pillow to prop body up  Aggravating: weather and pputting to much pressure on body     Recent buildup of ascites.  New for her.  On flexeril and diclofenac.  Review of notes shows alcoholic cirrhosis, and pancreatitis.       The following portions of the patient's history were reviewed by me and updated as appropriate: allergies, current medications, past family history, past medical history, past social history, past surgical history and problem list.    Past Medical History:   Diagnosis Date   • Abscess of buttock    • Acute bronchitis    • Acute maxillary sinusitis    • Acute otitis media    • Acute sinusitis    • Astigmatism    • Blister of vulva with infection    • Calf pain    • Cellulitis      both axilla   • Cough    • Cyst of ovary     history   • Diabetes mellitus screening    • Encounter for gynecological examination    • External hemorrhoids    • Hemorrhoids    • Hip pain    • Knee pain    • Migraine    • Nystagmus    • Screening for hyperlipidemia    • Seborrheic dermatitis     Left ear   • Serous otitis media    • Streptococcal sore throat    • Temporomandibular joint disorder    • Thyroid disorder screening    • Venereal disease screening        Social History     Social History   • Marital status: Single     Spouse name: N/A   • Number of children: N/A   • Years of education: N/A     Occupational History   • Not on file.     Social History Main Topics   • Smoking status: Current Every Day Smoker     Packs/day: 0.25     Years: 12.00     Types: Cigarettes   • Smokeless tobacco: Never Used      Comment: 1/4 ppd  (3/12/15)   • Alcohol use No   • Drug use: No   • Sexual activity: Defer     Other Topics Concern   • Not on file     Social History Narrative       Family History   Problem Relation Age of Onset    • Nystagmus Mother    • Blindness Mother    • Asthma Mother    • Hypertension Mother    • Nystagmus Other    • Cancer Father    • Alcohol abuse Paternal Uncle          Current Outpatient Prescriptions:   •  cetirizine (zyrTEC) 10 MG tablet, , Disp: , Rfl: 0  •  cyclobenzaprine (FLEXERIL) 10 MG tablet, , Disp: , Rfl: 0  •  docusate sodium (COLACE) 100 MG capsule, Take 100 mg by mouth 2 (Two) Times a Day. FANNIE NGUYEN, Disp: , Rfl:   •  fluticasone (FLONASE) 50 MCG/ACT nasal spray, into each nostril Daily., Disp: , Rfl:   •  ibuprofen (ADVIL,MOTRIN) 800 MG tablet, , Disp: , Rfl: 0  •  mirtazapine (REMERON) 15 MG tablet, Take 15 mg by mouth Every Night., Disp: , Rfl:   •  omeprazole (priLOSEC) 20 MG capsule, Take 1 capsule by mouth Daily., Disp: , Rfl: 0  •  PATIENT SUPPLIED MEDICATION, Take 1,000 Units by mouth Daily. OTC Vitamin D3 1000 units once daily, Disp: , Rfl:   •  spironolactone (ALDACTONE) 50 MG tablet, Take 1 tablet by mouth Daily., Disp: , Rfl: 0  •  traMADol (ULTRAM) 50 MG tablet, take 1 tablet by mouth every 6 hours if needed, Disp: , Rfl: 0  •  venlafaxine (EFFEXOR) 75 MG tablet, Take 75 mg by mouth Every Morning., Disp: , Rfl:   •  cyclobenzaprine (FLEXERIL) 10 MG tablet, Take 1 tablet by mouth 2 (Two) Times a Day., Disp: 60 tablet, Rfl: 0  •  meloxicam (MOBIC) 15 MG tablet, take 1 tablet by mouth once daily, Disp: , Rfl: 0    Allergies   Allergen Reactions   • Lexapro [Escitalopram Oxalate] Itching and Rash       Review of Systems   Musculoskeletal: Positive for back pain and neck pain.        R.leg     All other systems reviewed and are negative.    All systems reviewed and negative except for above.    Physical Exam   Constitutional: She appears well-developed and well-nourished. No distress.   Abdominal: She exhibits ascites.   Protuberant.    Musculoskeletal:        Cervical back: She exhibits decreased range of motion (full flexion, ext limted.  ), tenderness and pain.        Lumbar back: She  exhibits decreased range of motion (deferred due to protuberant abdomen), tenderness and pain.   Neurological: She is alert.   Psychiatric: She has a normal mood and affect. Her behavior is normal. Judgment normal.       Joseline was seen today for back pain, neck pain and pain.    Diagnoses and all orders for this visit:    Ascites due to alcoholic cirrhosis    Myofascial pain    Chronic bilateral low back pain, with sciatica presence unspecified      Medication: None at this time.  D/c diclofenac and flexeril until her GI issues are treated.  Seeing GI soon.  Management of her back pain needs to take a back seat to her current significant medical issues.  Not willing to start opioid in her setting of alcoholism even though she reports having abstained for a month.     Interventional: none at this time    Rehab: none at this time    Behavioral: No aberrant behavior noted. [facility] Report #[id number]  was reviewed and is consistent with stated history    Urine drug screen None at this time          This document has been electronically signed by Nawaf Machado MD on July 25, 2017 12:27 PM

## 2017-08-02 NOTE — ED PROVIDER NOTES
Subjective   HPI Comments: 36yo female [facility] significant etoh abuse/cirrhosis/pancreatitis, presents ED c/o diffuse back pain/generalized fatigue/soa.  Pt notably poor historian.  Denies fever/chills/chest pain/productive cough/melena/hematochoezia/hematemesis/dysuria.    Patient is a 37 y.o. female presenting with general illness.   Illness   Severity:  Severe  Onset quality:  Gradual  Timing:  Constant  Progression:  Worsening  Chronicity:  Recurrent  Associated symptoms: abdominal pain, fatigue and shortness of breath    Associated symptoms: no cough, no diarrhea, no nausea and no vomiting        Review of Systems   Constitutional: Positive for fatigue.   HENT: Negative.    Eyes: Negative.    Respiratory: Positive for shortness of breath. Negative for cough.    Cardiovascular: Negative.    Gastrointestinal: Positive for abdominal distention and abdominal pain. Negative for diarrhea, nausea and vomiting.   Endocrine: Negative.    Genitourinary: Negative.    Musculoskeletal: Positive for back pain.   Skin: Negative.    Allergic/Immunologic: Negative.        Past Medical History:   Diagnosis Date   • Abscess of buttock    • Acute bronchitis    • Acute maxillary sinusitis    • Acute otitis media    • Acute sinusitis    • Astigmatism    • Blister of vulva with infection    • Calf pain    • Cellulitis      both axilla   • Cough    • Cyst of ovary     history   • Diabetes mellitus screening    • Encounter for gynecological examination    • External hemorrhoids    • Hemorrhoids    • Hip pain    • Knee pain    • Migraine    • Nystagmus    • Screening for hyperlipidemia    • Seborrheic dermatitis     Left ear   • Serous otitis media    • Streptococcal sore throat    • Temporomandibular joint disorder    • Thyroid disorder screening    • Venereal disease screening        Allergies   Allergen Reactions   • Lexapro [Escitalopram Oxalate] Itching and Rash       Past Surgical History:   Procedure Laterality Date   • INJECTION OF  MEDICATION  03/12/2015    Bicillin LA 1.2 (1)      • PAP SMEAR  01/15/2008    PAP SMEAR (1)      • PARACENTESIS  07/2017    done at [facility]   • TUBAL ABDOMINAL LIGATION      Tubal ligation (1)          Family History   Problem Relation Age of Onset   • Nystagmus Mother    • Blindness Mother    • Asthma Mother    • Hypertension Mother    • Nystagmus Other    • Cancer Father    • Alcohol abuse Paternal Uncle        Social History     Social History   • Marital status: Single     Spouse name: N/A   • Number of children: N/A   • Years of education: N/A     Social History Main Topics   • Smoking status: Current Every Day Smoker     Packs/day: 0.25     Years: 12.00     Types: Cigarettes   • Smokeless tobacco: Never Used      Comment: 1/4 ppd  (3/12/15)   • Alcohol use No   • Drug use: No   • Sexual activity: Defer     Other Topics Concern   • Not on file     Social History Narrative           Objective   Physical Exam   Constitutional: She is oriented to person, place, and time.   Cachectic appearance   HENT:   Head: Normocephalic and atraumatic.   Right Ear: External ear normal.   Left Ear: External ear normal.   Nose: Nose normal.   Mouth/Throat: Oropharynx is clear and moist.   Eyes: Pupils are equal, round, and reactive to light.   Neck: Normal range of motion. Neck supple. No JVD present. No tracheal deviation present.   Cardiovascular: Regular rhythm, S1 normal, S2 normal and normal heart sounds.  Tachycardia present.    Pulmonary/Chest: Breath sounds normal. No accessory muscle usage or stridor. Tachypnea noted. She has no decreased breath sounds. She has no wheezes. She has no rhonchi. She has no rales.   Abdominal: She exhibits distension, fluid wave and ascites. She exhibits no mass. Bowel sounds are decreased. There is generalized tenderness. There is no rigidity, no rebound, no guarding and no CVA tenderness.   Musculoskeletal: Normal range of motion.   Lymphadenopathy:     She has no cervical adenopathy.  "  Neurological: She is alert and oriented to person, place, and time. GCS eye subscore is 4. GCS verbal subscore is 5. GCS motor subscore is 6.   Skin: Skin is warm and dry. She is not diaphoretic.   Nursing note and vitals reviewed.      ECG 12 Lead    Date/Time: 8/1/2017 10:59 PM  Performed by: CHRISTINE ESCOTO  Authorized by: CHRISTINE ESCOTO   Interpreted by physician  Rhythm: sinus tachycardia  Rate: tachycardic  BPM: 109  QRS axis: normal  Conduction: conduction normal  ST Segments: ST segments normal  T Waves: T waves normal  Other findings: LAE  Clinical impression: abnormal ECG      Insert Central Line At Bedside  Date/Time: 8/2/2017 12:15 AM  Performed by: CHRISTINE ESCOTO  Authorized by: CHRISTINE ESCOTO   Consent: Verbal consent obtained. Written consent obtained.  Risks and benefits: risks, benefits and alternatives were discussed  Consent given by: patient  Time out: Immediately prior to procedure a \"time out\" was called to verify the correct patient, procedure, equipment, support staff and site/side marked as required.  Indications: vascular access  Anesthesia: local infiltration    Anesthesia:  Local Anesthetic: lidocaine 1% without epinephrine  Anesthetic total: 1 mL    Sedation:  Patient sedated: no  Preparation: skin prepped with 2% chlorhexidine  Skin prep agent dried: skin prep agent completely dried prior to procedure  Sterile barriers: all five maximum sterile barriers used - cap, mask, sterile gown, sterile gloves, and large sterile sheet  Hand hygiene: hand hygiene performed prior to central venous catheter insertion  Location details: right internal jugular  Patient position: Trendelenburg  Catheter type: triple lumen  Catheter size: 7 Fr  Pre-procedure: landmarks identified  Ultrasound guidance: yes  Sterile ultrasound techniques: sterile gel and sterile probe covers were used  Number of attempts: 1  Successful placement: yes  Post-procedure: line sutured and dressing applied  Assessment: blood " return through all ports,  free fluid flow,  placement verified by x-ray and no pneumothorax on x-ray  Patient tolerance: Patient tolerated the procedure well with no immediate complications               ED Course  ED Course      Labs Reviewed   COMPREHENSIVE METABOLIC PANEL - Abnormal; Notable for the following:        Result Value    Glucose 45 (*)     BUN 26 (*)     Sodium 119 (*)     Chloride 86 (*)     Calcium 6.4 (*)     Total Protein 4.7 (*)     Albumin 2.10 (*)     ALT (SGPT) 93 (*)     AST (SGOT) 147 (*)     Alkaline Phosphatase 137 (*)     A/G Ratio 0.8 (*)     BUN/Creatinine Ratio 46.4 (*)     All other components within normal limits   AMYLASE - Abnormal; Notable for the following:     Amylase 318 (*)     All other components within normal limits   LIPASE - Abnormal; Notable for the following:     Lipase 421 (*)     All other components within normal limits   AMMONIA - Abnormal; Notable for the following:     Ammonia <9 (*)     All other components within normal limits   BLOOD GAS, ARTERIAL - Abnormal; Notable for the following:     pH, Arterial 7.542 (*)     pCO2, Arterial 25.7 (*)     pO2, Arterial 352.1 (*)     HCO3, Arterial 21.6 (*)     Hemoglobin, Blood Gas 9.5 (*)     CO2 Content 22.4 (*)     Sodium, Arterial 115.7 (*)     Ionized Calcium 3.7 (*)     All other components within normal limits   CBC WITH AUTO DIFFERENTIAL - Abnormal; Notable for the following:     WBC 14.11 (*)     RBC 2.78 (*)     Hemoglobin 8.3 (*)     Hematocrit 24.0 (*)     RDW 15.1 (*)     RDW-SD 47.5 (*)     Platelets 87 (*)     Neutrophil % 88.6 (*)     Lymphocyte % 3.5 (*)     Immature Grans % 6.4 (*)     Neutrophils, Absolute 12.50 (*)     Lymphocytes, Absolute 0.50 (*)     Immature Grans, Absolute 0.90 (*)     All other components within normal limits   PROTIME-INR - Normal    Narrative:     Therapeutic range for most indications is 2.0-3.0 INR,  or 2.5-3.5 for mechanical heart valves.   APTT - Normal    Narrative:      The recommended Heparin therapeutic range is 68-97 seconds.   HCG, SERUM, QUALITATIVE - Normal   URINALYSIS W/ CULTURE IF INDICATED   URINE DRUG SCREEN   SCAN SLIDE   TYPE AND SCREEN   CBC AND DIFFERENTIAL    Narrative:     The following orders were created for panel order CBC & Differential.  Procedure                               Abnormality         Status                     ---------                               -----------         ------                     Scan Slide[[id number]]                                       In process                 CBC Auto Differential[[id number]]        Abnormal            Final result                 Please view results for these tests on the individual orders.     Xr Chest 1 View    Result Date: 8/1/2017  Narrative: Chest single view on  8/1/2017 CLINICAL INDICATION: Shortness of breath COMPARISON: None FINDINGS: This is a low-volume inspiration film. There is linear atelectasis or scarring in the lung bases. The lungs are otherwise clear. Cardiac, hilar and mediastinal contours are within normal limits. Pulmonary vascularity is within normal limits. No bony abnormality is noted.     Impression: No acute disease. Electronically signed by:  Ge Haney  8/1/2017 10:49 PM CDT Workstation: [id number]              [facility]    Final diagnoses:   Alcoholic cirrhosis of liver with ascites   Hyponatremia   Hypoglycemia   Anemia, unspecified type   Respiratory alkalosis            Diogo Dickerson MD  08/02/17 0017       Diogo Dickerson MD  08/02/17 0018

## 2017-08-02 NOTE — PLAN OF CARE
Problem: Patient Care Overview (Adult)  Goal: Plan of Care Review  Outcome: Ongoing (interventions implemented as appropriate)    08/02/17 0505   Coping/Psychosocial Response Interventions   Plan Of Care Reviewed With patient   Patient Care Overview   Progress no change   Outcome Evaluation   Outcome Summary/Follow up Plan pt arrived via ER @ 0145. VSS for this shift. Pt remains tachycardic. Abdominal pressure causing pain relieved by PRN medication and positioning changes. Central line patent and infusing. Pt is AOx4 and lethargic.        Goal: Adult Individualization and Mutuality  Outcome: Ongoing (interventions implemented as appropriate)  Goal: Discharge Needs Assessment  Outcome: Ongoing (interventions implemented as appropriate)    Problem: Liver Failure, Acute/Chronic (Adult)  Goal: Signs and Symptoms of Listed Potential Problems Will be Absent or Manageable (Liver Failure, Acute/Chronic)  Outcome: Ongoing (interventions implemented as appropriate)    Problem: Respiratory Insufficiency (Adult)  Goal: Identify Related Risk Factors and Signs and Symptoms  Outcome: Outcome(s) achieved Date Met:  08/02/17  Goal: Acid/Base Balance  Outcome: Ongoing (interventions implemented as appropriate)  Goal: Effective Ventilation  Outcome: Ongoing (interventions implemented as appropriate)

## 2017-08-02 NOTE — PAYOR COMM NOTE
"Kaiden Parnell (37 y.o. Female)     Date of Birth Social Security Number Address Home Phone MRN    [id number]  [address] [facility] [address] [phone] [id number]    Mormon Marital Status          None Single       Admission Date Admission Type Admitting Provider Attending Provider Department, Room/Bed    8/1/17 Emergency Mo Garcia MD Patel, Harshul Amrutlal, MD [facility] CRITICAL CARE, 07/A    Discharge Date Discharge Disposition Discharge Destination                      Attending Provider: Mo Garcia MD     Allergies:  Lexapro [Escitalopram Oxalate]    Isolation:  None   Infection:  None   Code Status:  FULL    Ht:  65\" (165.1 cm)   Wt:  133 lb 9.6 oz (60.6 kg)    Admission Cmt:  None   Principal Problem:  Alcoholic cirrhosis of liver with ascites [K70.31]                 Active Insurance as of 8/1/2017     Primary Coverage     Payor Plan Insurance Group Employer/Plan Group    [employer] [place] [employer] [place]ICA[place]      Payor Plan Address Payor Plan Phone Number Effective From Effective To    [address] [address] [phone] 1/4/2017     [place] [address]       Subscriber Name Subscriber Birth Date Member ID       KAIDEN PARNELL 1980 [id number]                 Emergency Contacts      (Rel.) Home Phone Work Phone Mobile Phone    Susannah Ellison (Mother) -- -- [phone]               History & Physical      Mo Garcia MD at 8/2/2017 12:28 AM                [facility]  INPATIENT HISTORY AND PHYSICAL       Patient Care Team:  ERASMO Alvarez as PCP - General (Family Medicine)    Chief complaint   Chief Complaint   Patient presents with   • Abdominal Pain       Subjective     Patient is a 37 y.o. female presents with Complaint of having abdominal pain.  Patient is on call induced liver cirrhosis.  Patient had history of ascites.  Patient stated her abdomen was " swollen and she had severe abdominal pain which resulted in her coming to emergency room for further evaluation.  Patient does complain of having shortness of breath associated with it.  Upon arrival to emergency room patient was noted to be hyponatremic and patient was admitted for further evaluation and treatment.    Review of Systems   Review of Systems   Constitutional: Positive for appetite change, fatigue and unexpected weight change. Negative for chills, diaphoresis and fever.   HENT: Negative for congestion, rhinorrhea, sore throat and trouble swallowing.    Eyes: Negative for visual disturbance.   Respiratory: Positive for shortness of breath. Negative for cough, chest tightness and wheezing.    Cardiovascular: Positive for leg swelling. Negative for chest pain and palpitations.   Gastrointestinal: Positive for abdominal distention and abdominal pain. Negative for blood in stool, diarrhea, nausea and vomiting.   Endocrine: Negative for cold intolerance and heat intolerance.   Genitourinary: Negative for decreased urine volume and difficulty urinating.   Musculoskeletal: Negative for back pain, gait problem and neck pain.   Skin: Negative for rash.   Neurological: Negative for dizziness, syncope, weakness, light-headedness, numbness and headaches.   Psychiatric/Behavioral: The patient is not nervous/anxious.        History  Past Medical History:   Diagnosis Date   • Abscess of buttock    • Acute bronchitis    • Acute maxillary sinusitis    • Acute otitis media    • Acute sinusitis    • Astigmatism    • Blister of vulva with infection    • Calf pain    • Cellulitis      both axilla   • Cough    • Cyst of ovary     history   • Diabetes mellitus screening    • Encounter for gynecological examination    • External hemorrhoids    • Hemorrhoids    • Hip pain    • Knee pain    • Migraine    • Nystagmus    • Screening for hyperlipidemia    • Seborrheic dermatitis     Left ear   • Serous otitis media    •  Streptococcal sore throat    • Temporomandibular joint disorder    • Thyroid disorder screening    • Venereal disease screening      Past Surgical History:   Procedure Laterality Date   • INJECTION OF MEDICATION  03/12/2015    Bicillin LA 1.2 (1)      • PAP SMEAR  01/15/2008    PAP SMEAR (1)      • PARACENTESIS  07/2017    done at [facility]   • TUBAL ABDOMINAL LIGATION      Tubal ligation (1)        Family History   Problem Relation Age of Onset   • Nystagmus Mother    • Blindness Mother    • Asthma Mother    • Hypertension Mother    • Nystagmus Other    • Cancer Father    • Alcohol abuse Paternal Uncle      Social History   Substance Use Topics   • Smoking status: Current Every Day Smoker     Packs/day: 0.25     Years: 12.00     Types: Cigarettes   • Smokeless tobacco: Never Used      Comment: 1/4 ppd  (3/12/15)   • Alcohol use No       (Not in a hospital admission)  Allergies:  Lexapro [escitalopram oxalate]  Prior to Admission medications    Medication Sig Start Date End Date Taking? Authorizing Provider   cetirizine (zyrTEC) 10 MG tablet  2/1/17   Historical Provider, MD   cyclobenzaprine (FLEXERIL) 10 MG tablet  2/1/17   Historical Provider, MD   cyclobenzaprine (FLEXERIL) 10 MG tablet Take 1 tablet by mouth 2 (Two) Times a Day. 7/18/17   Alexis Dunham MD   docusate sodium (COLACE) 100 MG capsule Take 100 mg by mouth 2 (Two) Times a Day. FANNIE NGUYEN    Historical Provider, MD   fluticasone (FLONASE) 50 MCG/ACT nasal spray into each nostril Daily.    Historical Provider, MD   ibuprofen (ADVIL,MOTRIN) 800 MG tablet  11/23/16   Historical Provider, MD   meloxicam (MOBIC) 15 MG tablet take 1 tablet by mouth once daily 1/9/17   Historical Provider, MD   mirtazapine (REMERON) 15 MG tablet Take 15 mg by mouth Every Night.    Historical Provider, MD   omeprazole (priLOSEC) 20 MG capsule Take 1 capsule by mouth Daily. 7/8/17   Historical Provider, MD   PATIENT SUPPLIED MEDICATION Take 1,000 Units by mouth Daily. OTC  Vitamin D3 1000 units once daily    Historical Provider, MD   spironolactone (ALDACTONE) 50 MG tablet Take 1 tablet by mouth Daily. 7/8/17   Historical Provider, MD   traMADol (ULTRAM) 50 MG tablet take 1 tablet by mouth every 6 hours if needed 7/23/17   Historical Provider, MD   venlafaxine (EFFEXOR) 75 MG tablet Take 75 mg by mouth Every Morning.    Historical Provider, MD       Objective     Vital Signs    Temp:  [98.1 °F (36.7 °C)] 98.1 °F (36.7 °C)  Heart Rate:  [104-106] 104  Resp:  [24] 24  BP: (119)/(78) 119/78    Physical Exam:      Physical Exam   Constitutional: She is oriented to person, place, and time. She appears cachectic. She appears ill.   HENT:   Head: Normocephalic and atraumatic.   Eyes: EOM are normal. Pupils are equal, round, and reactive to light.   Neck: Normal range of motion. Neck supple. No JVD present. No tracheal deviation present. No thyromegaly present.   Cardiovascular: Regular rhythm.  Tachycardia present.    Pulmonary/Chest: She is in respiratory distress. She has no wheezes. She has rales.   Abdominal: Soft. Bowel sounds are normal. She exhibits distension and ascites. She exhibits no mass. There is tenderness. There is no guarding.   Musculoskeletal: Normal range of motion. She exhibits edema.   Neurological: She is alert and oriented to person, place, and time.   Skin: Skin is warm. She is not diaphoretic.       Results Review:       Results from last 7 days  Lab Units 08/01/17 2336 08/01/17  2223   SODIUM mmol/L 119*  --    SODIUM, ARTERIAL mmol/L  --  115.7*   POTASSIUM mmol/L 4.3  --    CHLORIDE mmol/L 86*  --    CO2 mmol/L 22.0  --    BUN mg/dL 26*  --    CREATININE mg/dL 0.56  --    GLUCOSE mg/dL 45*  --    GLUCOSE, ARTERIAL mmol/L  --  64   CALCIUM mg/dL 6.4*  --    BILIRUBIN mg/dL 1.2  --    ALK PHOS U/L 137*  --    ALT (SGPT) U/L 93*  --    AST (SGOT) U/L 147*  --                Results from last 7 days  Lab Units 08/01/17 2336   WBC 10*3/mm3 14.11*   HEMOGLOBIN g/dL  8.3*   HEMATOCRIT % 24.0*   PLATELETS 10*3/mm3 87*         Results from last 7 days  Lab Units 08/01/17  2336   INR  0.92     Imaging Results (last 7 days)     Procedure Component Value Units Date/Time    XR Chest 1 View [[id number]] Collected:  08/01/17 2231     Updated:  08/01/17 2250    Narrative:         Chest single view on  8/1/2017     CLINICAL INDICATION: Shortness of breath    COMPARISON: None    FINDINGS: This is a low-volume inspiration film. There is linear  atelectasis or scarring in the lung bases. The lungs are  otherwise clear. Cardiac, hilar and mediastinal contours are  within normal limits. Pulmonary vascularity is within normal  limits. No bony abnormality is noted.      Impression:       No acute disease.    Electronically signed by:  Ge Haney  8/1/2017 10:49 PM  CDT Workstation: [id number]    XR Chest Post CVA Port [[id number]] Updated:  08/02/17 0015          Assessment/Plan     Principal Problem:    Alcoholic cirrhosis of liver with ascites  Active Problems:    Chronic midline low back pain    Chronic pelvic pain in female    Ascites due to alcoholic cirrhosis    Moderate episode of recurrent major depressive disorder      -We'll continue with IV hydration.  -We'll get ultrasound-guided paracentesis done this morning.  -We'll get nephrology evaluation for hyponatremia.  -DVT and GI prophylaxis in place.  -We'll resume patient's home medication as prior to coming to the hospital.  -We'll continue monitoring patient hospital setting and treat patient as course dictates.    I discussed the patients findings and my recommendations with patient and nursing staff.         This document has been electronically signed by Mo Garcia MD on August 2, 2017 12:28 AM        Total Time Spent: 45 mins    EMR Dragon/Transcription disclaimer:   Dictated utilizing Dragon dictation.            Electronically signed by Mo Garcia MD at 8/2/2017  6:30 AM           Emergency  Department Notes      Diogo Dickerson MD at 8/1/2017 10:56 PM      Procedure Orders:    1. Insert Central Line At Bedside [[id number]] ordered by Diogo Dickerson MD at 08/02/17 0015     2. ECG 12 Lead [[id number]] ordered by Diogo Dickerson MD at 08/01/17 2222                Subjective   HPI Comments: 36yo female pmh significant etoh abuse/cirrhosis/pancreatitis, presents ED c/o diffuse back pain/generalized fatigue/soa.  Pt notably poor historian.  Denies fever/chills/chest pain/productive cough/melena/hematochoezia/hematemesis/dysuria.    Patient is a 37 y.o. female presenting with general illness.   Illness   Severity:  Severe  Onset quality:  Gradual  Timing:  Constant  Progression:  Worsening  Chronicity:  Recurrent  Associated symptoms: abdominal pain, fatigue and shortness of breath    Associated symptoms: no cough, no diarrhea, no nausea and no vomiting        Review of Systems   Constitutional: Positive for fatigue.   HENT: Negative.    Eyes: Negative.    Respiratory: Positive for shortness of breath. Negative for cough.    Cardiovascular: Negative.    Gastrointestinal: Positive for abdominal distention and abdominal pain. Negative for diarrhea, nausea and vomiting.   Endocrine: Negative.    Genitourinary: Negative.    Musculoskeletal: Positive for back pain.   Skin: Negative.    Allergic/Immunologic: Negative.        Past Medical History:   Diagnosis Date   • Abscess of buttock    • Acute bronchitis    • Acute maxillary sinusitis    • Acute otitis media    • Acute sinusitis    • Astigmatism    • Blister of vulva with infection    • Calf pain    • Cellulitis      both axilla   • Cough    • Cyst of ovary     history   • Diabetes mellitus screening    • Encounter for gynecological examination    • External hemorrhoids    • Hemorrhoids    • Hip pain    • Knee pain    • Migraine    • Nystagmus    • Screening for hyperlipidemia    • Seborrheic dermatitis     Left ear   • Serous otitis media    • Streptococcal sore  throat    • Temporomandibular joint disorder    • Thyroid disorder screening    • Venereal disease screening        Allergies   Allergen Reactions   • Lexapro [Escitalopram Oxalate] Itching and Rash       Past Surgical History:   Procedure Laterality Date   • INJECTION OF MEDICATION  03/12/2015    Bicillin LA 1.2 (1)      • PAP SMEAR  01/15/2008    PAP SMEAR (1)      • PARACENTESIS  07/2017    done at [facility]   • TUBAL ABDOMINAL LIGATION      Tubal ligation (1)          Family History   Problem Relation Age of Onset   • Nystagmus Mother    • Blindness Mother    • Asthma Mother    • Hypertension Mother    • Nystagmus Other    • Cancer Father    • Alcohol abuse Paternal Uncle        Social History     Social History   • Marital status: Single     Spouse name: N/A   • Number of children: N/A   • Years of education: N/A     Social History Main Topics   • Smoking status: Current Every Day Smoker     Packs/day: 0.25     Years: 12.00     Types: Cigarettes   • Smokeless tobacco: Never Used      Comment: 1/4 ppd  (3/12/15)   • Alcohol use No   • Drug use: No   • Sexual activity: Defer     Other Topics Concern   • Not on file     Social History Narrative           Objective   Physical Exam   Constitutional: She is oriented to person, place, and time.   Cachectic appearance   HENT:   Head: Normocephalic and atraumatic.   Right Ear: External ear normal.   Left Ear: External ear normal.   Nose: Nose normal.   Mouth/Throat: Oropharynx is clear and moist.   Eyes: Pupils are equal, round, and reactive to light.   Neck: Normal range of motion. Neck supple. No JVD present. No tracheal deviation present.   Cardiovascular: Regular rhythm, S1 normal, S2 normal and normal heart sounds.  Tachycardia present.    Pulmonary/Chest: Breath sounds normal. No accessory muscle usage or stridor. Tachypnea noted. She has no decreased breath sounds. She has no wheezes. She has no rhonchi. She has no rales.   Abdominal: She exhibits distension, fluid  "wave and ascites. She exhibits no mass. Bowel sounds are decreased. There is generalized tenderness. There is no rigidity, no rebound, no guarding and no CVA tenderness.   Musculoskeletal: Normal range of motion.   Lymphadenopathy:     She has no cervical adenopathy.   Neurological: She is alert and oriented to person, place, and time. GCS eye subscore is 4. GCS verbal subscore is 5. GCS motor subscore is 6.   Skin: Skin is warm and dry. She is not diaphoretic.   Nursing note and vitals reviewed.      ECG 12 Lead    Date/Time: 8/1/2017 10:59 PM  Performed by: CHRISTINE ESCOTO  Authorized by: CHRISTINE ESCOTO   Interpreted by physician  Rhythm: sinus tachycardia  Rate: tachycardic  BPM: 109  QRS axis: normal  Conduction: conduction normal  ST Segments: ST segments normal  T Waves: T waves normal  Other findings: LAE  Clinical impression: abnormal ECG      Insert Central Line At Bedside  Date/Time: 8/2/2017 12:15 AM  Performed by: CHRISTINE ESCOTO  Authorized by: CHRISTINE ESCOTO   Consent: Verbal consent obtained. Written consent obtained.  Risks and benefits: risks, benefits and alternatives were discussed  Consent given by: patient  Time out: Immediately prior to procedure a \"time out\" was called to verify the correct patient, procedure, equipment, support staff and site/side marked as required.  Indications: vascular access  Anesthesia: local infiltration    Anesthesia:  Local Anesthetic: lidocaine 1% without epinephrine  Anesthetic total: 1 mL    Sedation:  Patient sedated: no  Preparation: skin prepped with 2% chlorhexidine  Skin prep agent dried: skin prep agent completely dried prior to procedure  Sterile barriers: all five maximum sterile barriers used - cap, mask, sterile gown, sterile gloves, and large sterile sheet  Hand hygiene: hand hygiene performed prior to central venous catheter insertion  Location details: right internal jugular  Patient position: Trendelenburg  Catheter type: triple lumen  Catheter size: 7 " Fr  Pre-procedure: landmarks identified  Ultrasound guidance: yes  Sterile ultrasound techniques: sterile gel and sterile probe covers were used  Number of attempts: 1  Successful placement: yes  Post-procedure: line sutured and dressing applied  Assessment: blood return through all ports,  free fluid flow,  placement verified by x-ray and no pneumothorax on x-ray  Patient tolerance: Patient tolerated the procedure well with no immediate complications              ED Course  ED Course      Labs Reviewed   COMPREHENSIVE METABOLIC PANEL - Abnormal; Notable for the following:        Result Value    Glucose 45 (*)     BUN 26 (*)     Sodium 119 (*)     Chloride 86 (*)     Calcium 6.4 (*)     Total Protein 4.7 (*)     Albumin 2.10 (*)     ALT (SGPT) 93 (*)     AST (SGOT) 147 (*)     Alkaline Phosphatase 137 (*)     A/G Ratio 0.8 (*)     BUN/Creatinine Ratio 46.4 (*)     All other components within normal limits   AMYLASE - Abnormal; Notable for the following:     Amylase 318 (*)     All other components within normal limits   LIPASE - Abnormal; Notable for the following:     Lipase 421 (*)     All other components within normal limits   AMMONIA - Abnormal; Notable for the following:     Ammonia <9 (*)     All other components within normal limits   BLOOD GAS, ARTERIAL - Abnormal; Notable for the following:     pH, Arterial 7.542 (*)     pCO2, Arterial 25.7 (*)     pO2, Arterial 352.1 (*)     HCO3, Arterial 21.6 (*)     Hemoglobin, Blood Gas 9.5 (*)     CO2 Content 22.4 (*)     Sodium, Arterial 115.7 (*)     Ionized Calcium 3.7 (*)     All other components within normal limits   CBC WITH AUTO DIFFERENTIAL - Abnormal; Notable for the following:     WBC 14.11 (*)     RBC 2.78 (*)     Hemoglobin 8.3 (*)     Hematocrit 24.0 (*)     RDW 15.1 (*)     RDW-SD 47.5 (*)     Platelets 87 (*)     Neutrophil % 88.6 (*)     Lymphocyte % 3.5 (*)     Immature Grans % 6.4 (*)     Neutrophils, Absolute 12.50 (*)     Lymphocytes, Absolute  0.50 (*)     Immature Grans, Absolute 0.90 (*)     All other components within normal limits   PROTIME-INR - Normal    Narrative:     Therapeutic range for most indications is 2.0-3.0 INR,  or 2.5-3.5 for mechanical heart valves.   APTT - Normal    Narrative:     The recommended Heparin therapeutic range is 68-97 seconds.   HCG, SERUM, QUALITATIVE - Normal   URINALYSIS W/ CULTURE IF INDICATED   URINE DRUG SCREEN   SCAN SLIDE   TYPE AND SCREEN   CBC AND DIFFERENTIAL    Narrative:     The following orders were created for panel order CBC & Differential.  Procedure                               Abnormality         Status                     ---------                               -----------         ------                     Scan Slide[[id number]]                                       In process                 CBC Auto Differential[[id number]]        Abnormal            Final result                 Please view results for these tests on the individual orders.     Xr Chest 1 View    Result Date: 8/1/2017  Narrative: Chest single view on  8/1/2017 CLINICAL INDICATION: Shortness of breath COMPARISON: None FINDINGS: This is a low-volume inspiration film. There is linear atelectasis or scarring in the lung bases. The lungs are otherwise clear. Cardiac, hilar and mediastinal contours are within normal limits. Pulmonary vascularity is within normal limits. No bony abnormality is noted.     Impression: No acute disease. Electronically signed by:  Ge Haney  8/1/2017 10:49 PM CDT Workstation: [id number]              MDM    Final diagnoses:   Alcoholic cirrhosis of liver with ascites   Hyponatremia   Hypoglycemia   Anemia, unspecified type   Respiratory alkalosis            Diogo Dickerson MD  08/02/17 0017       Diogo Dickerson MD  08/02/17 0018       Electronically signed by Diogo Dickerson MD at 8/2/2017 12:18 AM        Vital Signs (last 24 hours)       08/01 0700  -  08/02 0659 08/02 0700  -  08/02 0954   Most  Recent    Temp (°F) 96.6 -  98.1       96.6 (35.9)    Heart Rate 104 -  (!)122       117    Resp   24       24    /78 -  141/93       132/90    SpO2 (%) 98 -  100       99          Hospital Medications (active)       Dose Frequency Start End    acetaminophen (TYLENOL) tablet 650 mg 650 mg Every 4 Hours PRN 8/2/2017     Sig - Route: Take 2 tablets by mouth Every 4 (Four) Hours As Needed for Mild Pain (1-3). - Oral    bisacodyl (DULCOLAX) suppository 10 mg 10 mg Daily PRN 8/2/2017     Sig - Route: Insert 1 suppository into the rectum Daily As Needed for Constipation. - Rectal    cyclobenzaprine (FLEXERIL) tablet 10 mg 10 mg 2 Times Daily 8/2/2017     Sig - Route: Take 1 tablet by mouth 2 (Two) Times a Day. - Oral    dextrose (D50W) solution 50 mL 50 mL Every 1 Hour PRN 8/2/2017     Sig - Route: Infuse 50 mL into a venous catheter Every 1 (One) Hour As Needed for Low Blood Sugar. - Intravenous    dextrose 5 % and sodium chloride 0.9 % with KCl 20 mEq/L infusion 125 mL/hr Continuous 8/2/2017     Sig - Route: Infuse 125 mL/hr into a venous catheter Continuous. - Intravenous    docusate sodium (COLACE) capsule 100 mg 100 mg 2 Times Daily 8/2/2017     Sig - Route: Take 1 capsule by mouth 2 (Two) Times a Day. - Oral    famotidine (PEPCID) tablet 20 mg 20 mg 2 Times Daily 8/2/2017     Sig - Route: Take 1 tablet by mouth 2 (Two) Times a Day. - Oral    heparin (porcine) 5000 UNIT/ML injection 5,000 Units 5,000 Units Every 12 Hours Scheduled 8/2/2017     Sig - Route: Inject 1 mL under the skin Every 12 (Twelve) Hours. - Subcutaneous    hydrALAZINE (APRESOLINE) injection 10 mg 10 mg Every 6 Hours PRN 8/2/2017     Sig - Route: Infuse 0.5 mL into a venous catheter Every 6 (Six) Hours As Needed for High Blood Pressure (SBP > 160). - Intravenous    LORazepam (ATIVAN) injection 1 mg 1 mg Every 6 Hours PRN 8/2/2017 8/12/2017    Sig - Route: Infuse 0.5 mL into a venous catheter Every 6 (Six) Hours As Needed for Anxiety. -  "Intravenous    magnesium hydroxide (MILK OF MAGNESIA) suspension 2400 mg/10mL 10 mL 10 mL Daily PRN 8/2/2017     Sig - Route: Take 10 mL by mouth Daily As Needed for Constipation. - Oral    mirtazapine (REMERON) tablet 15 mg 15 mg Nightly 8/2/2017     Sig - Route: Take 1 tablet by mouth Every Night. - Oral    Morphine sulfate (PF) injection 1 mg 1 mg Every 4 Hours PRN 8/2/2017 8/12/2017    Sig - Route: Infuse 0.5 mL into a venous catheter Every 4 (Four) Hours As Needed for Moderate Pain (4-6) or Severe Pain  (7-10). - Intravenous    Morphine sulfate (PF) injection 4 mg 4 mg Once 8/2/2017 8/2/2017    Sig - Route: Infuse 1 mL into a venous catheter 1 (One) Time. - Intravenous    nicotine (NICODERM CQ) 21 MG/24HR patch 1 patch 1 patch Every 24 Hours 8/2/2017     Sig - Route: Place 1 patch on the skin Daily. - Transdermal    ondansetron (ZOFRAN) injection 4 mg 4 mg Once 8/2/2017 8/2/2017    Sig - Route: Infuse 2 mL into a venous catheter 1 (One) Time. - Intravenous    ondansetron (ZOFRAN) injection 4 mg 4 mg Every 6 Hours PRN 8/2/2017     Sig - Route: Infuse 2 mL into a venous catheter Every 6 (Six) Hours As Needed for Nausea or Vomiting. - Intravenous    Linked Group 1:  \"Or\" Linked Group Details        ondansetron (ZOFRAN) tablet 4 mg 4 mg Every 6 Hours PRN 8/2/2017     Sig - Route: Take 1 tablet by mouth Every 6 (Six) Hours As Needed for Nausea or Vomiting. - Oral    Linked Group 1:  \"Or\" Linked Group Details        ondansetron ODT (ZOFRAN-ODT) disintegrating tablet 4 mg 4 mg Every 6 Hours PRN 8/2/2017     Sig - Route: Take 1 tablet by mouth Every 6 (Six) Hours As Needed for Nausea or Vomiting. - Oral    Linked Group 1:  \"Or\" Linked Group Details        pantoprazole (PROTONIX) EC tablet 40 mg 40 mg Every Morning 8/2/2017     Sig - Route: Take 1 tablet by mouth Every Morning. - Oral    sodium chloride 0.9 % bolus 500 mL 500 mL Once 8/2/2017 8/2/2017    Sig - Route: Infuse 500 mL into a venous catheter 1 (One) Time. - " "Intravenous    sodium chloride 0.9 % flush 1-10 mL 1-10 mL As Needed 8/2/2017     Sig - Route: Infuse 1-10 mL into a venous catheter As Needed for Line Care. - Intravenous    sodium chloride 0.9 % flush 10 mL 10 mL As Needed 8/1/2017     Sig - Route: Infuse 10 mL into a venous catheter As Needed for Line Care. - Intravenous    Linked Group 2:  \"And\" Linked Group Details        sodium chloride 0.9 % infusion 125 mL/hr Continuous 8/1/2017     Sig - Route: Infuse 125 mL/hr into a venous catheter Continuous. - Intravenous    sodium chloride 0.9 % infusion 100 mL/hr Continuous 8/2/2017     Sig - Route: Infuse 100 mL/hr into a venous catheter Continuous. - Intravenous    spironolactone (ALDACTONE) tablet 50 mg 50 mg Daily 8/2/2017     Sig - Route: Take 1 tablet by mouth Daily. - Oral    venlafaxine (EFFEXOR) tablet 75 mg 75 mg Every Morning 8/2/2017     Sig - Route: Take 1 tablet by mouth Every Morning. - Oral    ipratropium-albuterol (DUO-NEB) nebulizer solution 3 mL (Discontinued) 3 mL Once 8/1/2017 8/2/2017    Sig - Route: Take 3 mL by nebulization 1 (One) Time. - Nebulization          Orders (last 24 hrs)     Start     Ordered    08/03/17 0600  Comprehensive Metabolic Panel  Daily      08/02/17 0158    08/03/17 0600  Basic Metabolic Panel  Daily      08/02/17 0158    08/03/17 0600  Hepatic Function Panel  Daily      08/02/17 0158    08/03/17 0600  Magnesium  Daily      08/02/17 0158    08/03/17 0600  Urinalysis With / Microscopic If Indicated  Once      08/02/17 0158    08/02/17 0900  docusate sodium (COLACE) capsule 100 mg  2 Times Daily      08/02/17 0158    08/02/17 0900  spironolactone (ALDACTONE) tablet 50 mg  Daily      08/02/17 0158    08/02/17 0900  cyclobenzaprine (FLEXERIL) tablet 10 mg  2 Times Daily      08/02/17 0158    08/02/17 0900  famotidine (PEPCID) tablet 20 mg  2 Times Daily      08/02/17 0158    08/02/17 0800  US Paracentesis  1 Time Imaging      08/02/17 0606    08/02/17 0700  venlafaxine " (EFFEXOR) tablet 75 mg  Every Morning      08/02/17 0158    08/02/17 0700  pantoprazole (PROTONIX) EC tablet 40 mg  Every Morning      08/02/17 0158    08/02/17 0400  Vital Signs  Every 4 Hours      08/02/17 0158    08/02/17 0400  Pulse Oximetry,  Spot  Every 4 Hours      08/02/17 0158    08/02/17 0230  mirtazapine (REMERON) tablet 15 mg  Nightly      08/02/17 0158    08/02/17 0230  sodium chloride 0.9 % infusion  Continuous      08/02/17 0158    08/02/17 0230  heparin (porcine) 5000 UNIT/ML injection 5,000 Units  Every 12 Hours Scheduled      08/02/17 0158    08/02/17 0230  nicotine (NICODERM CQ) 21 MG/24HR patch 1 patch  Every 24 Hours      08/02/17 0158 08/02/17 0211  Manual Differential  Once      08/02/17 0210    08/02/17 0205  POC Glucose Fingerstick  Once      08/02/17 0204    08/02/17 0200  Strict Intake and Output  Every Hour      08/02/17 0158    08/02/17 0159  Nursing Communication Hold pain medication, anxiety medication and anti-hypertensive medications if SBP < 100.  Continuous     Comments:  Hold pain medication, anxiety medication and anti-hypertensive medications if SBP < 100.    08/02/17 0158 08/02/17 0159  Full Code  Continuous      08/02/17 0158 08/02/17 0159  Cardiac Monitoring  Continuous      08/02/17 0158 08/02/17 0159  Activity - Ad Mindy  Until Discontinued      08/02/17 0158 08/02/17 0159  Daily Weights  Daily      08/02/17 0158 08/02/17 0159  Tobacco Cessation Education  Once      08/02/17 0158    08/02/17 0159  Notify Physician (with Parameters)  Until Discontinued      08/02/17 0158 08/02/17 0159  Diet Regular; Cardiac, Consistent Carbohydrate  Diet Effective Now      08/02/17 0158    08/02/17 0159  Urinalysis With / Culture If Indicated  Once      08/02/17 0158 08/02/17 0159  Nurse to order ECG for unrelieved or new onset chest pain.  Until Discontinued      08/02/17 0158 08/02/17 0159  Insert Peripheral IV  Once      08/02/17 0158    08/02/17 0159  Saline Lock  & Maintain IV Access  Continuous      08/02/17 0158    08/02/17 0159  Tobacco Cessation Education  Prior to Discharge      08/02/17 0158    08/02/17 0159  VTE Risk Assessment - Moderate Risk  Once      08/02/17 0158    08/02/17 0159  Place Sequential Compression Device  Once      08/02/17 0158    08/02/17 0159  Maintain Sequential Compression Device  Continuous      08/02/17 0158    08/02/17 0159  Place Compression Stockings (TEDs)  Once      08/02/17 0158    08/02/17 0159  Remove & Replace Compression Stockings (TEDS) Daily  Daily      08/02/17 0158    08/02/17 0158  hydrALAZINE (APRESOLINE) injection 10 mg  Every 6 Hours PRN      08/02/17 0158    08/02/17 0158  ondansetron (ZOFRAN) tablet 4 mg  Every 6 Hours PRN      08/02/17 0158    08/02/17 0158  ondansetron ODT (ZOFRAN-ODT) disintegrating tablet 4 mg  Every 6 Hours PRN      08/02/17 0158    08/02/17 0158  ondansetron (ZOFRAN) injection 4 mg  Every 6 Hours PRN      08/02/17 0158    08/02/17 0158  LORazepam (ATIVAN) injection 1 mg  Every 6 Hours PRN      08/02/17 0158    08/02/17 0158  Morphine sulfate (PF) injection 1 mg  Every 4 Hours PRN      08/02/17 0158    08/02/17 0158  sodium chloride 0.9 % flush 1-10 mL  As Needed      08/02/17 0158    08/02/17 0158  acetaminophen (TYLENOL) tablet 650 mg  Every 4 Hours PRN      08/02/17 0158    08/02/17 0158  bisacodyl (DULCOLAX) suppository 10 mg  Daily PRN      08/02/17 0158    08/02/17 0158  magnesium hydroxide (MILK OF MAGNESIA) suspension 2400 mg/10mL 10 mL  Daily PRN      08/02/17 0158    08/02/17 0125  XR Chest 1 View  1 Time Imaging      08/02/17 0124    08/02/17 0107  PREVIOUS HISTORY  Once      08/02/17 0106    08/02/17 0025  dextrose 5 % and sodium chloride 0.9 % with KCl 20 mEq/L infusion  Continuous      08/02/17 0023    08/02/17 0023  Nephrology - BOONE (on-call MD from group unless specified)  Once     Specialty:  Nephrology  Provider:  (Not yet assigned)    08/02/17 0022    08/02/17 0018  Hospitalist  (on-call MD unless specified)  Once     Specialty:  Hospitalist  Provider:  Mo Garcia MD    08/02/17 0017    08/02/17 0018  Inpatient Admission  Once      08/02/17 0017    08/02/17 0018  ICU / CCU Bed Request  Once      08/02/17 0017    08/02/17 0016  Insert Central Line At Bedside  Once     Comments:  This order was created via procedure documentation    08/02/17 0015    08/02/17 0014  sodium chloride 0.9 % bolus 500 mL  Once      08/02/17 0012    08/02/17 0014  Morphine sulfate (PF) injection 4 mg  Once      08/02/17 0012    08/02/17 0014  ondansetron (ZOFRAN) injection 4 mg  Once      08/02/17 0012    08/02/17 0013  Type & Screen  Once      08/02/17 0012    08/02/17 0012  dextrose (D50W) solution 50 mL  Every 1 Hour PRN      08/02/17 0012    08/01/17 2356  XR Chest Post CVA Port  1 Time Imaging      08/01/17 2346    08/01/17 2352  Scan Slide  Once,   Status:  Canceled      08/01/17 2351    08/01/17 2346  XR Chest 1 View  1 Time Imaging,   Status:  Canceled      08/01/17 2346    08/01/17 2345  Comprehensive Metabolic Panel  Once      08/01/17 2222    08/01/17 2345  Amylase  Once      08/01/17 2222    08/01/17 2345  Lipase  Once      08/01/17 2222    08/01/17 2237  ipratropium-albuterol (DUO-NEB) nebulizer solution 3 mL  Once,   Status:  Discontinued      08/01/17 2235    08/01/17 2224  sodium chloride 0.9 % infusion  Continuous      08/01/17 2222    08/01/17 2223  Blood Gas, Arterial  STAT      08/01/17 2222    08/01/17 2223  XR Chest 1 View  1 Time Imaging      08/01/17 2222    08/01/17 2223  Cardiac Monitoring  Once      08/01/17 2222    08/01/17 2223  Pulse Oximetry, Continuous  Per Hospital Policy      08/01/17 2222    08/01/17 2223  Insert peripheral IV  Once      08/01/17 2222    08/01/17 2222  sodium chloride 0.9 % flush 10 mL  As Needed      08/01/17 2222 08/01/17 2222  Urine Drug Screen  Once      08/01/17 2222 08/01/17 2222  Ammonia  STAT      08/01/17 2222 08/01/17 2222  hCG,  Serum, Qualitative  Once      08/01/17 2222    08/01/17 2222  ECG 12 Lead  Once      08/01/17 2222    08/01/17 2221  CBC & Differential  Once      08/01/17 2222    08/01/17 2221  Urinalysis With / Culture If Indicated  Once      08/01/17 2222    08/01/17 2221  Protime-INR  Once      08/01/17 2222    08/01/17 2221  aPTT  Once      08/01/17 2222    08/01/17 2221  CBC Auto Differential  PROCEDURE ONCE      08/01/17 2222    Unscheduled  Oxygen Therapy- Nasal Cannula; 2 LPM; Titrate for SPO2: equal to or greater than, 92%  As Needed      08/01/17 2222          Physician Progress Notes (last 24 hours) (Notes from 8/1/2017  9:54 AM through 8/2/2017  9:54 AM)     No notes of this type exist for this encounter.        Consult Notes (last 24 hours) (Notes from 8/1/2017  9:54 AM through 8/2/2017  9:54 AM)     No notes of this type exist for this encounter.      Chey Mosquera RN [facility]  [phone]  Fax [phone]

## 2017-08-02 NOTE — H&P
[facility]  INPATIENT HISTORY AND PHYSICAL       Patient Care Team:  ERASMO Alvarez as PCP - General (Family Medicine)    Chief complaint   Chief Complaint   Patient presents with   • Abdominal Pain       Subjective     Patient is a 37 y.o. female presents with Complaint of having abdominal pain.  Patient is on call induced liver cirrhosis.  Patient had history of ascites.  Patient stated her abdomen was swollen and she had severe abdominal pain which resulted in her coming to emergency room for further evaluation.  Patient does complain of having shortness of breath associated with it.  Upon arrival to emergency room patient was noted to be hyponatremic and patient was admitted for further evaluation and treatment.    Review of Systems   Review of Systems   Constitutional: Positive for appetite change, fatigue and unexpected weight change. Negative for chills, diaphoresis and fever.   HENT: Negative for congestion, rhinorrhea, sore throat and trouble swallowing.    Eyes: Negative for visual disturbance.   Respiratory: Positive for shortness of breath. Negative for cough, chest tightness and wheezing.    Cardiovascular: Positive for leg swelling. Negative for chest pain and palpitations.   Gastrointestinal: Positive for abdominal distention and abdominal pain. Negative for blood in stool, diarrhea, nausea and vomiting.   Endocrine: Negative for cold intolerance and heat intolerance.   Genitourinary: Negative for decreased urine volume and difficulty urinating.   Musculoskeletal: Negative for back pain, gait problem and neck pain.   Skin: Negative for rash.   Neurological: Negative for dizziness, syncope, weakness, light-headedness, numbness and headaches.   Psychiatric/Behavioral: The patient is not nervous/anxious.        History  Past Medical History:   Diagnosis Date   • Abscess of buttock    • Acute bronchitis    • Acute maxillary sinusitis       • Acute otitis media    • Acute sinusitis    • Astigmatism    • Blister of vulva with infection    • Calf pain    • Cellulitis      both axilla   • Cough    • Cyst of ovary     history   • Diabetes mellitus screening    • Encounter for gynecological examination    • External hemorrhoids    • Hemorrhoids    • Hip pain    • Knee pain    • Migraine    • Nystagmus    • Screening for hyperlipidemia    • Seborrheic dermatitis     Left ear   • Serous otitis media    • Streptococcal sore throat    • Temporomandibular joint disorder    • Thyroid disorder screening    • Venereal disease screening      Past Surgical History:   Procedure Laterality Date   • INJECTION OF MEDICATION  03/12/2015    Bicillin LA 1.2 (1)      • PAP SMEAR  01/15/2008    PAP SMEAR (1)      • PARACENTESIS  07/2017    done at [facility]   • TUBAL ABDOMINAL LIGATION      Tubal ligation (1)        Family History   Problem Relation Age of Onset   • Nystagmus Mother    • Blindness Mother    • Asthma Mother    • Hypertension Mother    • Nystagmus Other    • Cancer Father    • Alcohol abuse Paternal Uncle      Social History   Substance Use Topics   • Smoking status: Current Every Day Smoker     Packs/day: 0.25     Years: 12.00     Types: Cigarettes   • Smokeless tobacco: Never Used      Comment: 1/4 ppd  (3/12/15)   • Alcohol use No       (Not in a hospital admission)  Allergies:  Lexapro [escitalopram oxalate]  Prior to Admission medications    Medication Sig Start Date End Date Taking? Authorizing Provider   cetirizine (zyrTEC) 10 MG tablet  2/1/17   Historical Provider, MD   cyclobenzaprine (FLEXERIL) 10 MG tablet  2/1/17   Historical Provider, MD   cyclobenzaprine (FLEXERIL) 10 MG tablet Take 1 tablet by mouth 2 (Two) Times a Day. 7/18/17   Alexis Dunham MD   docusate sodium (COLACE) 100 MG capsule Take 100 mg by mouth 2 (Two) Times a Day. FANNIE NGUYEN    Historical Provider, MD   fluticasone (FLONASE) 50 MCG/ACT nasal spray into each nostril Daily.     Historical Provider, MD   ibuprofen (ADVIL,MOTRIN) 800 MG tablet  11/23/16   Historical Provider, MD   meloxicam (MOBIC) 15 MG tablet take 1 tablet by mouth once daily 1/9/17   Historical Provider, MD   mirtazapine (REMERON) 15 MG tablet Take 15 mg by mouth Every Night.    Historical Provider, MD   omeprazole (priLOSEC) 20 MG capsule Take 1 capsule by mouth Daily. 7/8/17   Historical Provider, MD   PATIENT SUPPLIED MEDICATION Take 1,000 Units by mouth Daily. OTC Vitamin D3 1000 units once daily    Historical Provider, MD   spironolactone (ALDACTONE) 50 MG tablet Take 1 tablet by mouth Daily. 7/8/17   Historical Provider, MD   traMADol (ULTRAM) 50 MG tablet take 1 tablet by mouth every 6 hours if needed 7/23/17   Historical Provider, MD   venlafaxine (EFFEXOR) 75 MG tablet Take 75 mg by mouth Every Morning.    Historical Provider, MD       Objective     Vital Signs    Temp:  [98.1 °F (36.7 °C)] 98.1 °F (36.7 °C)  Heart Rate:  [104-106] 104  Resp:  [24] 24  BP: (119)/(78) 119/78    Physical Exam:      Physical Exam   Constitutional: She is oriented to person, place, and time. She appears cachectic. She appears ill.   HENT:   Head: Normocephalic and atraumatic.   Eyes: EOM are normal. Pupils are equal, round, and reactive to light.   Neck: Normal range of motion. Neck supple. No JVD present. No tracheal deviation present. No thyromegaly present.   Cardiovascular: Regular rhythm.  Tachycardia present.    Pulmonary/Chest: She is in respiratory distress. She has no wheezes. She has rales.   Abdominal: Soft. Bowel sounds are normal. She exhibits distension and ascites. She exhibits no mass. There is tenderness. There is no guarding.   Musculoskeletal: Normal range of motion. She exhibits edema.   Neurological: She is alert and oriented to person, place, and time.   Skin: Skin is warm. She is not diaphoretic.       Results Review:       Results from last 7 days  Lab Units 08/01/17  2336 08/01/17  2223   SODIUM mmol/L 119*   --    SODIUM, ARTERIAL mmol/L  --  115.7*   POTASSIUM mmol/L 4.3  --    CHLORIDE mmol/L 86*  --    CO2 mmol/L 22.0  --    BUN mg/dL 26*  --    CREATININE mg/dL 0.56  --    GLUCOSE mg/dL 45*  --    GLUCOSE, ARTERIAL mmol/L  --  64   CALCIUM mg/dL 6.4*  --    BILIRUBIN mg/dL 1.2  --    ALK PHOS U/L 137*  --    ALT (SGPT) U/L 93*  --    AST (SGOT) U/L 147*  --                Results from last 7 days  Lab Units 08/01/17  2336   WBC 10*3/mm3 14.11*   HEMOGLOBIN g/dL 8.3*   HEMATOCRIT % 24.0*   PLATELETS 10*3/mm3 87*         Results from last 7 days  Lab Units 08/01/17  2336   INR  0.92     Imaging Results (last 7 days)     Procedure Component Value Units Date/Time    XR Chest 1 View [[id number]] Collected:  08/01/17 2231     Updated:  08/01/17 2250    Narrative:         Chest single view on  8/1/2017     CLINICAL INDICATION: Shortness of breath    COMPARISON: None    FINDINGS: This is a low-volume inspiration film. There is linear  atelectasis or scarring in the lung bases. The lungs are  otherwise clear. Cardiac, hilar and mediastinal contours are  within normal limits. Pulmonary vascularity is within normal  limits. No bony abnormality is noted.      Impression:       No acute disease.    Electronically signed by:  Ge Haney  8/1/2017 10:49 PM  CDT Workstation: [id number]    XR Chest Post CVA Port [[id number]] Updated:  08/02/17 0015          Assessment/Plan     Principal Problem:    Alcoholic cirrhosis of liver with ascites  Active Problems:    Chronic midline low back pain    Chronic pelvic pain in female    Ascites due to alcoholic cirrhosis    Moderate episode of recurrent major depressive disorder      -We'll continue with IV hydration.  -We'll get ultrasound-guided paracentesis done this morning.  -We'll get nephrology evaluation for hyponatremia.  -DVT and GI prophylaxis in place.  -We'll resume patient's home medication as prior to coming to the hospital.  -We'll continue monitoring patient hospital  setting and treat patient as course dictates.    I discussed the patients findings and my recommendations with patient and nursing staff.         This document has been electronically signed by Mo Garcia MD on August 2, 2017 12:28 AM        Total Time Spent: 45 mins    EMR Dragon/Transcription disclaimer:   Dictated utilizing Dragon dictation.

## 2017-08-02 NOTE — CONSULTS
[facility]  [address] [place] [address]. [address]   - [phone]  F  [phone]     Consultation         PATIENT  DEMOGRAPHICS   PATIENT NAME: Joseline Parnell                      PHYSICIAN: Roc Barnes MD  : 1980  MRN: [id number]    Subjective   SUBJECTIVE   Referring Provider: Dr Dickerson, Dr Valentin, Dr Gu  Reason for Consultation: hyponatremia  History of present illness:     Ms. Parnell is a 37-year-old female who has history of alcoholic liver cirrhosis.  She has a paracentesis done about a month ago.  At that time she is started on spironolactone.  She is also on Effexor and Remeron.  She has nearly 30 pounds in the last couple months.    Patient mainly presented with the left lower rib cage pain radiating radiating down to the left side of the abdomen.  She denies any diaphoresis.  She has marked ascites.  She has some swelling in the lower extremity more so on the left side than right.    On further workup she was found to have a sodium of 119.  She did receive normal saline up to 500 cc and currently running at 50 cc/h.  She denies any prior problem with low sodium.  Her renal function is stable.    Past Medical History:   Diagnosis Date   • Abscess of buttock    • Acute bronchitis    • Acute maxillary sinusitis    • Acute otitis media    • Acute sinusitis    • Astigmatism    • Blister of vulva with infection    • Calf pain    • Cellulitis      both axilla   • Cough    • Cyst of ovary     history   • Diabetes mellitus screening    • Encounter for gynecological examination    • External hemorrhoids    • Hemorrhoids    • Hip pain    • Knee pain    • Migraine    • Nystagmus    • Pancreatitis    • Screening for hyperlipidemia    • Seborrheic dermatitis     Left ear   • Serous otitis media    • Streptococcal sore throat    • Temporomandibular joint disorder    • Thyroid disorder screening    • Venereal disease screening      Past Surgical History:   Procedure  "Laterality Date   • INJECTION OF MEDICATION  03/12/2015    Bicillin LA 1.2 (1)      • PAP SMEAR  01/15/2008    PAP SMEAR (1)      • PARACENTESIS  07/2017    done at [facility]   • TUBAL ABDOMINAL LIGATION      Tubal ligation (1)        Family History   Problem Relation Age of Onset   • Nystagmus Mother    • Blindness Mother    • Asthma Mother    • Hypertension Mother    • Nystagmus Other    • Cancer Father    • Alcohol abuse Paternal Uncle      Social History   Substance Use Topics   • Smoking status: Current Every Day Smoker     Packs/day: 0.25     Years: 12.00     Types: Cigarettes   • Smokeless tobacco: Never Used      Comment: 1/4 ppd  (3/12/15)   • Alcohol use No     Allergies:  Lexapro [escitalopram oxalate]     REVIEW OF SYSTEMS    Review of Systems   Constitutional: Negative for chills and fever.   Respiratory: Positive for shortness of breath. Negative for chest tightness.    Cardiovascular: Negative for chest pain and leg swelling.   Gastrointestinal: Positive for abdominal distention and abdominal pain. Negative for diarrhea and nausea.   Genitourinary: Negative for dysuria, flank pain and hematuria.   Neurological: Negative for dizziness, syncope and weakness.       Objective   OBJECTIVE   Vital Signs  Temp:  [96.6 °F (35.9 °C)-98.1 °F (36.7 °C)] 96.6 °F (35.9 °C)  Heart Rate:  [104-122] 117  Resp:  [24] 24  BP: (115-141)/() 132/90    Flowsheet Rows         First Filed Value    Admission Height  65\" (165.1 cm) Documented at 08/02/2017 0046    Admission Weight  120 lb (54.4 kg) Documented at 08/02/2017 0046           I/O last 3 completed shifts:  In: 304 [I.V.:304]  Out: 200 [Urine:200]    PHYSICAL EXAM    Physical Exam   Constitutional: She is oriented to person, place, and time. She appears well-developed.   HENT:   Head: Normocephalic.   Eyes: Pupils are equal, round, and reactive to light.   Cardiovascular: Normal rate, regular rhythm and normal heart sounds.    Pulmonary/Chest: Breath sounds normal. " She is in respiratory distress.   Abdominal: Soft. Bowel sounds are normal. She exhibits distension.   Musculoskeletal: She exhibits edema.   Neurological: She is alert and oriented to person, place, and time.       RESULTS   Results Review:      Results from last 7 days  Lab Units 08/01/17  2336 08/01/17  2223   SODIUM mmol/L 119*  --    SODIUM, ARTERIAL mmol/L  --  115.7*   POTASSIUM mmol/L 4.3  --    CHLORIDE mmol/L 86*  --    CO2 mmol/L 22.0  --    BUN mg/dL 26*  --    CREATININE mg/dL 0.56  --    CALCIUM mg/dL 6.4*  --    BILIRUBIN mg/dL 1.2  --    ALK PHOS U/L 137*  --    ALT (SGPT) U/L 93*  --    AST (SGOT) U/L 147*  --    GLUCOSE mg/dL 45*  --    GLUCOSE, ARTERIAL mmol/L  --  64       Estimated Creatinine Clearance: 130.3 mL/min (by C-G formula based on Cr of 0.56).                  Results from last 7 days  Lab Units 08/01/17  2336   WBC 10*3/mm3 14.11*   HEMOGLOBIN g/dL 8.3*   PLATELETS 10*3/mm3 87*         Results from last 7 days  Lab Units 08/01/17  2336   INR  0.92        MEDICATIONS      cyclobenzaprine 10 mg Oral BID   docusate sodium 100 mg Oral BID   famotidine 20 mg Oral BID   heparin (porcine) 5,000 Units Subcutaneous Q12H   nicotine 1 patch Transdermal Q24H   pantoprazole 40 mg Oral QAM   venlafaxine 75 mg Oral QAM       sodium chloride 50 mL/hr Last Rate: 100 mL/hr (08/02/17 0957)     Prescriptions Prior to Admission   Medication Sig Dispense Refill Last Dose   • mirtazapine (REMERON) 15 MG tablet Take 15 mg by mouth Every Night.   Taking   • cetirizine (zyrTEC) 10 MG tablet   0 Taking   • cyclobenzaprine (FLEXERIL) 10 MG tablet   0 Taking   • cyclobenzaprine (FLEXERIL) 10 MG tablet Take 1 tablet by mouth 2 (Two) Times a Day. 60 tablet 0 Not Taking   • docusate sodium (COLACE) 100 MG capsule Take 100 mg by mouth 2 (Two) Times a Day. RA COL-RITE   Taking   • fluticasone (FLONASE) 50 MCG/ACT nasal spray into each nostril Daily.   Taking   • ibuprofen (ADVIL,MOTRIN) 800 MG tablet   0 Taking   •  meloxicam (MOBIC) 15 MG tablet take 1 tablet by mouth once daily  0 Not Taking   • omeprazole (priLOSEC) 20 MG capsule Take 1 capsule by mouth Daily.  0 Taking   • PATIENT SUPPLIED MEDICATION Take 1,000 Units by mouth Daily. OTC Vitamin D3 1000 units once daily   Taking   • spironolactone (ALDACTONE) 50 MG tablet Take 1 tablet by mouth Daily.  0 Taking   • traMADol (ULTRAM) 50 MG tablet take 1 tablet by mouth every 6 hours if needed  0 Taking   • venlafaxine (EFFEXOR) 75 MG tablet Take 75 mg by mouth Every Morning.   Taking     Assessment/Plan   ASSESSMENT / PLAN    Principal Problem:    Alcoholic cirrhosis of liver with ascites  Active Problems:    Chronic midline low back pain    Chronic pelvic pain in female    Ascites due to alcoholic cirrhosis    Moderate episode of recurrent major depressive disorder    1.hyponatremia.  This is likely secondary to hypervolemic state in the setting of chronic liver disease.  Also she is on spironolactone which is recently started and that can drop the sodium.  She is also on mirtazapine which can cause SIADH.    I will get urine sodium and urine osmolality.  I will check serum uric acid and TSH to workup for hyponatremia.  I will keep the normal saline running at 50 cc per hour.  I will add Lasix 20 mg IV twice a day and check the sodium every 6 hourly for the next 3-4 times.  Tolvaptan is contraindicated because of liver cirrhosis.  we can't give salt tablet due to marked anasarca.  Also hypertonic saline is not indicated due to marked ascites but if above fails she may need it for short term    2.alcoholic liver cirrhosis with marked ascites recent paracentesis about a month ago.  Patient has now abdominal distention with left lower chest pain.  She will need paracentesis again.    3.chronic back pain/depression         I discussed the patients findings and my recommendations with patient         This document has been electronically signed by Roc Barnes MD on August 2,  2017 10:30 AM

## 2017-08-03 NOTE — PROGRESS NOTES
"[facility]  [address] [place] [address]. [address]  T - [phone]  F - [phone]     Progress Note          PATIENT  DEMOGRAPHICS   PATIENT NAME: Joselnie Parnell                      PHYSICIAN: Roc Barnes MD  : 1980  MRN: [id number]   LOS: 1 day    Patient Care Team:  ERASMO Alvarez as PCP - General (Family Medicine)  Subjective   SUBJECTIVE   S/p paracentesis feels better         Objective   OBJECTIVE   Vital Signs  Temp:  [97.6 °F (36.4 °C)-98.3 °F (36.8 °C)] 97.8 °F (36.6 °C)  Heart Rate:  [108-122] 118  Resp:  [16-20] 16  BP: ()/(60-95) 119/81    Flowsheet Rows         First Filed Value    Admission Height  65\" (165.1 cm) Documented at 2017 004    Admission Weight  120 lb (54.4 kg) Documented at 2017 0046           I/O last 3 completed shifts:  In: 1219 [I.V.:1219]  Out: 92425 [Urine:2100; Other:92825; Stool:250]    PHYSICAL EXAM    Physical Exam   Constitutional: She is oriented to person, place, and time. She appears well-developed.   HENT:   Head: Normocephalic.   Eyes: Pupils are equal, round, and reactive to light.   Cardiovascular: Normal rate, regular rhythm and normal heart sounds.    Pulmonary/Chest: Effort normal and breath sounds normal.   Abdominal: Soft. Bowel sounds are normal. She exhibits distension.   Neurological: She is alert and oriented to person, place, and time.       RESULTS   Results Review:      Results from last 7 days  Lab Units 17  0401 17  2004 17  1617  17  2336 17  2223   SODIUM mmol/L 125* 120* 119*  < > 119*  --    SODIUM, ARTERIAL mmol/L  --   --   --   --   --  115.7*   POTASSIUM mmol/L 3.1*  --   --   --  4.3  --    CHLORIDE mmol/L 88*  --   --   --  86*  --    CO2 mmol/L 30.0  --   --   --  22.0  --    BUN mg/dL 14  --   --   --  26*  --    CREATININE mg/dL 0.53  --   --   --  0.56  --    CALCIUM mg/dL 6.6*  --   --   --  6.4*  --    BILIRUBIN mg/dL 1.1  --   --   --  1.2  --  "   ALK PHOS U/L 77  --   --   --  137*  --    ALT (SGPT) U/L 68*  --   --   --  93*  --    AST (SGOT) U/L 93*  --   --   --  147*  --    GLUCOSE mg/dL 70  --   --   --  45*  --    GLUCOSE, ARTERIAL mmol/L  --   --   --   --   --  64   < > = values in this interval not displayed.    Estimated Creatinine Clearance: 118.1 mL/min (by C-G formula based on Cr of 0.53).      Results from last 7 days  Lab Units 08/03/17  0401   MAGNESIUM mg/dL 1.8         Results from last 7 days  Lab Units 08/02/17  1115   URIC ACID mg/dL 6.8         Results from last 7 days  Lab Units 08/03/17  0804 08/03/17  0401 08/01/17  2336   WBC 10*3/mm3 4.60 5.18 14.11*   HEMOGLOBIN g/dL 6.9* 6.8* 8.3*   PLATELETS 10*3/mm3 55* 57* 87*         Results from last 7 days  Lab Units 08/01/17  2336   INR  0.92         Imaging Results (last 24 hours)     Procedure Component Value Units Date/Time    US Paracentesis [[id number]] Collected:  08/02/17 1435    Specimen:  Body Fluid Updated:  08/02/17 1654    Narrative:         PROCEDURE: Ultrasound-guided paracentesis    COMPARISON: None    HISTORY: Ascites    TECHNIQUE:  The risks, benefits and alternatives were explained to the  patient who had ample opportunity to ask questions. The patient  agreed to proceed and informed consent was obtained.    An adequate fluid pocket was identified in the right lower  quadrant. The site was marked then prepped and draped in sterile  fashion. Approximately 5 mL of 2% lidocaine solution was used for  local superficial and deep anesthesia. A 5 Albanian Yueh needle was  inserted into the peritoneal cavity under sonographic guidance.    FINDINGS:   Approximately 10.8 liters of clear, serous fluid was removed.  There were no immediate post procedure complications.      Impression:       CONCLUSION:    Successful ultrasound-guided paracentesis, as described above.    Electronically signed by:  Rakan Moraes MD  8/2/2017 4:52 PM CDT  Workstation: [id number]           MEDICATIONS        albumin human 25 g Intravenous Daily   cyclobenzaprine 10 mg Oral BID   docusate sodium 100 mg Oral BID   famotidine 20 mg Oral BID   furosemide 20 mg Intravenous Q12H   heparin (porcine) 5,000 Units Subcutaneous Q12H   midodrine 2.5 mg Oral TID AC   nicotine 1 patch Transdermal Q24H   octreotide 50 mcg Subcutaneous TID   pantoprazole 40 mg Oral QAM   venlafaxine 75 mg Oral QAM       sodium chloride 25 mL/hr Last Rate: 25 mL/hr (08/03/17 0104)       Assessment/Plan   ASSESSMENT / PLAN    Principal Problem:    Alcoholic cirrhosis of liver with ascites  Active Problems:    Chronic midline low back pain    Chronic pelvic pain in female    Ascites due to alcoholic cirrhosis    Moderate episode of recurrent major depressive disorder    1.hyponatremia.  This is likely secondary to hypervolemic state in the setting of chronic liver disease.  Also she is on spironolactone which is recently started and that can drop the sodium.  She is also on mirtazapine which can cause SIADH.     Urine na is low suggesting heparo renal / volume depletion. Didn't respond with 0.9 and therefore 3% now. Check na later.  tsh and uric acid normal . keep Lasix 20 mg IV twice a day. Tolvaptan is contraindicated because of liver cirrhosis.      2.alcoholic liver cirrhosis with marked ascites recent paracentesis about a month ago.  Patient has now abdominal distention with left lower chest pain.  s/p paracentesis had 10 L removed. Keep sandostatin midodrine and albumin.      3.chronic back pain/depression    4. Anemia plan for 2 U PRBC today    5. Hypokalemia replace today    6. Hypocalcemia with low albumin - check vitamin d              This document has been electronically signed by Roc Barnes MD on August 3, 2017 10:38 AM

## 2017-08-03 NOTE — PROGRESS NOTES
[facility] Medicine Services  INPATIENT PROGRESS NOTE    Length of Stay: 1  Date of Admission: 8/1/2017  Primary Care Physician: ERASMO Alvarez    Subjective   Chief Complaint:   Chief Complaint   Patient presents with   • Abdominal Pain       HPI:  HPI      Review of Systems   Constitutional: Positive for appetite change and fatigue. Negative for activity change, chills, diaphoresis, fever and unexpected weight change.   HENT: Negative.  Negative for congestion, dental problem, drooling, ear discharge, ear pain, facial swelling, hearing loss, mouth sores, nosebleeds, postnasal drip, rhinorrhea, sinus pressure, sneezing, tinnitus, trouble swallowing and voice change.    Eyes: Negative for photophobia and discharge.   Respiratory: Negative for apnea, cough, choking, shortness of breath, wheezing and stridor.    Cardiovascular: Negative for chest pain, palpitations and leg swelling.   Gastrointestinal: Positive for abdominal distention. Negative for abdominal pain, anal bleeding, blood in stool, constipation, diarrhea, nausea, rectal pain and vomiting.   Endocrine: Negative for cold intolerance, heat intolerance, polydipsia, polyphagia and polyuria.   Genitourinary: Negative for dysuria, enuresis, frequency, hematuria and urgency.   Musculoskeletal: Negative for arthralgias, back pain, joint swelling, myalgias, neck pain and neck stiffness.   Skin: Negative for color change, pallor, rash and wound.   Allergic/Immunologic: Negative for food allergies and immunocompromised state.   Neurological: Positive for weakness. Negative for dizziness, tremors, seizures, syncope, facial asymmetry, speech difficulty, light-headedness, numbness and headaches.   Hematological: Negative for adenopathy.   Psychiatric/Behavioral: Negative for agitation, behavioral problems, confusion, hallucinations, sleep disturbance and suicidal ideas. The patient is not nervous/anxious.         All  pertinent negatives and positives are as above. All other systems have been reviewed and are negative unless otherwise stated.     Objective    Temp:  [97.6 °F (36.4 °C)-98.7 °F (37.1 °C)] 97.8 °F (36.6 °C)  Heart Rate:  [108-140] 129  Resp:  [16-20] 16  BP: ()/(58-86) 111/73  Physical Exam   Constitutional: She is oriented to person, place, and time. She appears well-developed and well-nourished.   HENT:   Head: Normocephalic and atraumatic.   Eyes: EOM are normal. Pupils are equal, round, and reactive to light.   Cardiovascular: Regular rhythm.  Exam reveals no gallop and no friction rub.    No murmur heard.  Pulmonary/Chest: Effort normal and breath sounds normal. She has no wheezes. She has no rales.   Abdominal: Soft. Bowel sounds are normal. She exhibits distension.   Ascites    Musculoskeletal: She exhibits edema.   Lower ext edema bilat    Neurological: She is oriented to person, place, and time. No cranial nerve deficit.   Psychiatric: She has a normal mood and affect. Her behavior is normal. Judgment and thought content normal.           Results Review:  I have reviewed the labs, radiology results, and diagnostic studies.    Laboratory Data:   Lab Results (last 24 hours)     Procedure Component Value Units Date/Time    Sodium [[id number]]  (Abnormal) Collected:  08/02/17 1617    Specimen:  Blood Updated:  08/02/17 1646     Sodium 119 (C) mmol/L     Sodium, Urine, Random [[id number]]  (Abnormal) Collected:  08/02/17 1855    Specimen:  Urine from Urine, Clean Catch Updated:  08/02/17 1909     Sodium, Urine 6 (L) mmol/L     Osmolality, Urine [[id number]]  (Normal) Collected:  08/02/17 1855    Specimen:  Urine from Urine, Clean Catch Updated:  08/02/17 1912     Osmolality, Urine 621 mOsm/kg     Urinalysis With / Culture If Indicated [[id number]]  (Abnormal) Collected:  08/02/17 1854    Specimen:  Urine from Urine, Clean Catch Updated:  08/02/17 1959     Color, UA Zoë     Appearance, UA Cloudy (A)     pH,  UA 6.0     Specific Gravity, UA 1.023     Glucose, UA Negative     Ketones, UA 40 mg/dL (2+) (A)     Bilirubin, UA Moderate (2+) (A)     Blood, UA Moderate (2+) (A)     Protein, UA Trace (A)     Leuk Esterase, UA Large (3+) (A)     Nitrite, UA Negative     Urobilinogen, UA 4.0 E.U./dL (A)    Antinuclear Antibodies Direct [[id number]] Collected:  08/02/17 2004    Specimen:  Blood Updated:  08/02/17 2006    Alpha - 1 - Antitrypsin [[id number]] Collected:  08/02/17 2004    Specimen:  Blood Updated:  08/02/17 2006    Alpha - 1 - Antitrypsin Phenotype [[id number]] Collected:  08/02/17 2004    Specimen:  Blood Updated:  08/02/17 2006    Anti-Smooth Muscle Antibody Titer [[id number]] Collected:  08/02/17 2004    Specimen:  Blood Updated:  08/02/17 2006    Ceruloplasmin [[id number]] Collected:  08/02/17 2004    Specimen:  Blood Updated:  08/02/17 2006    AFP Tumor Marker [[id number]] Collected:  08/02/17 2004    Specimen:  Blood Updated:  08/02/17 2006    Sodium [[id number]]  (Abnormal) Collected:  08/02/17 2004    Specimen:  Blood Updated:  08/02/17 2024     Sodium 120 (C) mmol/L     Urine Drug Screen [[id number]]  (Abnormal) Collected:  08/02/17 1854    Specimen:  Urine from Urine, Clean Catch Updated:  08/02/17 2028     Amphetamine Screen, Urine Negative     Barbiturates Screen, Urine Negative     Benzodiazepine Screen, Urine Negative     Cocaine Screen, Urine Negative     Methadone Screen, Urine Negative     Opiate Screen Positive (A)     Oxycodone Screen, Urine Negative     THC, Screen, Urine Positive (A)    Narrative:       Negative Thresholds For Drugs Screened in Urine:     Amphetamines          500 ng/ml  Barbiturates          200 ng/ml  Benzodiazepines       200 ng/ml  Cocaine               150 ng/ml  Methadone             300 ng/mL  Opiates               300 ng/mL  Oxycodone             100 ng/mL  THC                   20 ng/mL    The normal value for all drugs tested is negative. This report includes final unconfirmed  screening results.  A positive result by this assay can be, at your request, sent to the Reference Lab for confirmation by gas chromatography. Unconfirmed results must not be used for non-medical purposes, such as employment or legal testing. Clinical consideration should be applied to any drug of abuse test result, particularly when unconfirmed results are used.    Urinalysis, Microscopic Only [[id number]]  (Abnormal) Collected:  08/02/17 1854    Specimen:  Urine from Urine, Clean Catch Updated:  08/02/17 2050     RBC, UA Too Numerous to Count (A) /HPF      WBC, UA Too Numerous to Count (A) /HPF      Bacteria, UA 2+ (A) /HPF      Squamous Epithelial Cells, UA 6-12 (A) /HPF      Hyaline Casts, UA 21-30 /LPF      Methodology Manual Light Microscopy    Extra Tubes [[id number]] Collected:  08/02/17 2004    Specimen:  Blood from Blood, Venous Line Updated:  08/02/17 2201    Narrative:       The following orders were created for panel order Extra Tubes.  Procedure                               Abnormality         Status                     ---------                               -----------         ------                     Lavender Top[[id number]]                                     Final result               Gold Top - SST[[id number]]                                   Final result                 Please view results for these tests on the individual orders.    Lavender Top [[id number]] Collected:  08/02/17 2004    Specimen:  Blood Updated:  08/02/17 2201     Extra Tube hold for add-on      Auto resulted       Gold Top - SST [[id number]] Collected:  08/02/17 2004    Specimen:  Blood Updated:  08/02/17 2201     Extra Tube Hold for add-ons.      Auto resulted.       CBC Auto Differential [[id number]]  (Abnormal) Collected:  08/03/17 0401    Specimen:  Blood Updated:  08/03/17 0446     WBC 5.18 10*3/mm3      RBC 2.24 (L) 10*6/mm3      Hemoglobin 6.8 (L) g/dL      Hematocrit 19.8 (L) %      MCV 88.4 fL      MCH 30.4 pg      MCHC  34.3 g/dL      RDW 15.3 (H) %      RDW-SD 49.6 (H) fl      MPV 10.9 fL      Platelets 57 (L) 10*3/mm3      Neutrophil % 93.1 (H) %      Lymphocyte % 4.4 (L) %      Monocyte % 1.0 %      Eosinophil % 0.0 %      Basophil % 0.0 %      Immature Grans % 1.5 (H) %      Neutrophils, Absolute 4.82 10*3/mm3      Lymphocytes, Absolute 0.23 (L) 10*3/mm3      Monocytes, Absolute 0.05 10*3/mm3      Eosinophils, Absolute 0.00 10*3/mm3      Basophils, Absolute 0.00 10*3/mm3      Immature Grans, Absolute 0.08 (H) 10*3/mm3     CBC & Differential [[id number]] Collected:  08/03/17 0401    Specimen:  Blood Updated:  08/03/17 0446    Narrative:       The following orders were created for panel order CBC & Differential.  Procedure                               Abnormality         Status                     ---------                               -----------         ------                     Scan Slide[[id number]]                                                                  CBC Auto Differential[[id number]]        Abnormal            Final result                 Please view results for these tests on the individual orders.    Magnesium [[id number]]  (Normal) Collected:  08/03/17 0401    Specimen:  Blood Updated:  08/03/17 0453     Magnesium 1.8 mg/dL     Bilirubin, Direct [[id number]]  (Normal) Collected:  08/03/17 0401    Specimen:  Blood Updated:  08/03/17 0453     Bilirubin, Direct 0.0 mg/dL     Iron Profile [[id number]]  (Abnormal) Collected:  08/03/17 0401    Specimen:  Blood Updated:  08/03/17 0455     Iron <10 (L) mcg/dL      TIBC 141 (L) mcg/dL      Iron Saturation -- %       Unable to calculate.       Comprehensive Metabolic Panel [[id number]]  (Abnormal) Collected:  08/03/17 0401    Specimen:  Blood Updated:  08/03/17 0457     Glucose 70 mg/dL      BUN 14 mg/dL      Creatinine 0.53 mg/dL      Sodium 125 (L) mmol/L      Potassium 3.1 (L) mmol/L      Chloride 88 (L) mmol/L      CO2 30.0 mmol/L      Calcium 6.6 (L) mg/dL      Total  Protein 4.2 (L) g/dL      Albumin 2.00 (L) g/dL      ALT (SGPT) 68 (H) U/L      AST (SGOT) 93 (H) U/L      Alkaline Phosphatase 77 U/L      Total Bilirubin 1.1 mg/dL      eGFR  African Amer 156 (H) mL/min/1.73      Globulin 2.2 (L) gm/dL      A/G Ratio 0.9 (L) g/dL      BUN/Creatinine Ratio 26.4 (H)     Anion Gap 7.0 mmol/L     Urine Culture [[id number]]  (Normal) Collected:  08/02/17 1854    Specimen:  Urine from Urine, Clean Catch Updated:  08/03/17 0623     Urine Culture Culture in progress    CBC & Differential [[id number]] Collected:  08/03/17 0804    Specimen:  Blood Updated:  08/03/17 0823    Narrative:       The following orders were created for panel order CBC & Differential.  Procedure                               Abnormality         Status                     ---------                               -----------         ------                     Scan Slide[[id number]]                                                                  CBC Auto Differential[[id number]]        Abnormal            Final result                 Please view results for these tests on the individual orders.    CBC Auto Differential [[id number]]  (Abnormal) Collected:  08/03/17 0804    Specimen:  Blood Updated:  08/03/17 0823     WBC 4.60 10*3/mm3      RBC 2.30 (L) 10*6/mm3      Hemoglobin 6.9 (L) g/dL       RESULTS CALLED TO SKYLA WELCH/SHILA        Hematocrit 20.2 (L) %      MCV 87.8 fL      MCH 30.0 pg      MCHC 34.2 g/dL      RDW 15.4 (H) %      RDW-SD 49.1 (H) fl      MPV 10.8 fL      Platelets 55 (L) 10*3/mm3      Neutrophil % 90.6 (H) %      Lymphocyte % 5.9 (L) %      Monocyte % 1.1 %      Eosinophil % 0.2 %      Basophil % 0.2 %      Immature Grans % 2.0 (H) %      Neutrophils, Absolute 4.17 10*3/mm3      Lymphocytes, Absolute 0.27 (L) 10*3/mm3      Monocytes, Absolute 0.05 10*3/mm3      Eosinophils, Absolute 0.01 10*3/mm3      Basophils, Absolute 0.01 10*3/mm3      Immature Grans, Absolute 0.09 (H) 10*3/mm3     Urinalysis With /  Microscopic If Indicated [[id number]]  (Abnormal) Collected:  08/03/17 0831    Specimen:  Urine from Urine, Clean Catch Updated:  08/03/17 0841     Color, UA Yellow     Appearance, UA Clear     pH, UA 6.5     Specific Gravity, UA 1.012     Glucose, UA Negative     Ketones, UA 15 mg/dL (1+) (A)     Bilirubin, UA Small (1+) (A)     Blood, UA Negative     Protein, UA Negative     Leuk Esterase, UA Trace (A)     Nitrite, UA Negative     Urobilinogen, UA 2.0 E.U./dL (A)    Urinalysis, Microscopic Only [[id number]]  (Abnormal) Collected:  08/03/17 0831    Specimen:  Urine from Urine, Clean Catch Updated:  08/03/17 0841     RBC, UA 0-2 (A) /HPF      WBC, UA 3-5 /HPF      Bacteria, UA None Seen /HPF      Squamous Epithelial Cells, UA None Seen /HPF      Hyaline Casts, UA 0-2 /LPF      Methodology Automated Microscopy    Extra Tubes [[id number]] Collected:  08/03/17 0804    Specimen:  Blood from Blood, Venous Line Updated:  08/03/17 1001    Narrative:       The following orders were created for panel order Extra Tubes.  Procedure                               Abnormality         Status                     ---------                               -----------         ------                     Green Top (Gel)[[id number]]                                  Final result                 Please view results for these tests on the individual orders.    Green Top (Gel) [[id number]] Collected:  08/03/17 0804    Specimen:  Blood Updated:  08/03/17 1001     Extra Tube Hold for add-ons.      Auto resulted.       Sodium [[id number]]  (Abnormal) Collected:  08/03/17 1153    Specimen:  Blood Updated:  08/03/17 1209     Sodium 125 (L) mmol/L     CBC Auto Differential [[id number]]  (Abnormal) Collected:  08/03/17 1153    Specimen:  Blood Updated:  08/03/17 1212     WBC 3.82 10*3/mm3      RBC 2.01 (L) 10*6/mm3      Hemoglobin 5.9 (C) g/dL      Hematocrit 17.7 (L) %      MCV 88.1 fL      MCH 29.4 pg      MCHC 33.3 g/dL      RDW 15.4 (H) %      RDW-SD  49.7 (H) fl      MPV 10.6 fL      Platelets 50 (L) 10*3/mm3      Neutrophil % 92.2 (H) %      Lymphocyte % 5.5 (L) %      Monocyte % 0.5 %      Eosinophil % 0.0 %      Basophil % 0.0 %      Immature Grans % 1.8 (H) %      Neutrophils, Absolute 3.52 10*3/mm3      Lymphocytes, Absolute 0.21 (L) 10*3/mm3      Monocytes, Absolute 0.02 10*3/mm3      Eosinophils, Absolute 0.00 10*3/mm3      Basophils, Absolute 0.00 10*3/mm3      Immature Grans, Absolute 0.07 (H) 10*3/mm3      nRBC 0.0 /100 WBC     CBC & Differential [[id number]] Collected:  08/03/17 1153    Specimen:  Blood Updated:  08/03/17 1307    Narrative:       The following orders were created for panel order CBC & Differential.  Procedure                               Abnormality         Status                     ---------                               -----------         ------                     Manual Differential[[id number]]                                                         Scan Slide[[id number]]                                       Final result               CBC Auto Differential[[id number]]        Abnormal            Final result                 Please view results for these tests on the individual orders.    Scan Slide [[id number]] Collected:  08/03/17 1153    Specimen:  Blood Updated:  08/03/17 1307     Anisocytosis Slight/1+     Hypochromia Slight/1+      Few target cells observed        WBC Morphology Normal     Platelet Estimate Decreased          Culture Data:   No results found for: BLOODCX  Urine Culture   Date Value Ref Range Status   08/02/2017 Culture in progress  Preliminary     No results found for: RESPCX  No results found for: WOUNDCX  No results found for: STOOLCX  No components found for: BODYFLD    Radiology Data:   Imaging Results (last 24 hours)     Procedure Component Value Units Date/Time    US Paracentesis [[id number]] Collected:  08/02/17 1435    Specimen:  Body Fluid Updated:  08/02/17 8691    Narrative:         PROCEDURE:  Ultrasound-guided paracentesis    COMPARISON: None    HISTORY: Ascites    TECHNIQUE:  The risks, benefits and alternatives were explained to the  patient who had ample opportunity to ask questions. The patient  agreed to proceed and informed consent was obtained.    An adequate fluid pocket was identified in the right lower  quadrant. The site was marked then prepped and draped in sterile  fashion. Approximately 5 mL of 2% lidocaine solution was used for  local superficial and deep anesthesia. A 5 Korean Yueh needle was  inserted into the peritoneal cavity under sonographic guidance.    FINDINGS:   Approximately 10.8 liters of clear, serous fluid was removed.  There were no immediate post procedure complications.      Impression:       CONCLUSION:    Successful ultrasound-guided paracentesis, as described above.    Electronically signed by:  Rakan Moraes MD  8/2/2017 4:52 PM CDT  Workstation: [id number]          I have reviewed the patient current medications.     Assessment/Plan   Principal Problem:    Alcoholic cirrhosis of liver with ascites  Active Problems:    Chronic midline low back pain    Chronic pelvic pain in female    Ascites due to alcoholic cirrhosis    Moderate episode of recurrent major depressive disorder  Anemia of chronic disease   Hypokaliemia         -We'll continue with IV hydration.  -We'll get ultrasound-guided paracentesis done this morning.got drained .  -We'll get nephrology evaluation for hyponatremia. emory hypertonic saline fluid restiction and  Lasix . sodiun is better today   Started on sandostatin and midodrine   Will transfuse 2 units of PRBC .will monitor H&H  -DVT and GI prophylaxis in place.  -We'll resume patient's home medication as prior to coming to the hospital.  -We'll continue monitoring patient hospital setting and treat patient as course dictates  Poor pg overall   Lobo Gu MD   08/03/17   2:14 PM

## 2017-08-03 NOTE — PLAN OF CARE
Problem: Patient Care Overview (Adult)  Goal: Plan of Care Review  Outcome: Ongoing (interventions implemented as appropriate)    08/02/17 0505 08/02/17 1909   Coping/Psychosocial Response Interventions   Plan Of Care Reviewed With patient --    Patient Care Overview   Progress no change --    Outcome Evaluation   Outcome Summary/Follow up Plan --  paracentesis, removed 10.8 L, pt vss, no other complaints at this time.       Goal: Adult Individualization and Mutuality  Outcome: Ongoing (interventions implemented as appropriate)  Goal: Discharge Needs Assessment  Outcome: Ongoing (interventions implemented as appropriate)    Problem: Liver Failure, Acute/Chronic (Adult)  Goal: Signs and Symptoms of Listed Potential Problems Will be Absent or Manageable (Liver Failure, Acute/Chronic)  Outcome: Ongoing (interventions implemented as appropriate)    Problem: Respiratory Insufficiency (Adult)  Goal: Acid/Base Balance  Outcome: Ongoing (interventions implemented as appropriate)  Goal: Effective Ventilation  Outcome: Ongoing (interventions implemented as appropriate)

## 2017-08-03 NOTE — NURSING NOTE
Pt to ultrasound via bed and monitor and this nurse  Pt back from [facility]

## 2017-08-03 NOTE — PLAN OF CARE
Problem: Patient Care Overview (Adult)  Goal: Plan of Care Review  Outcome: Ongoing (interventions implemented as appropriate)    08/03/17 0259   Coping/Psychosocial Response Interventions   Plan Of Care Reviewed With patient   Patient Care Overview   Progress no change   Outcome Evaluation   Outcome Summary/Follow up Plan pt has been restless throughout the night - x10+ up to the bathroom to void, x1 diarhea. Complaints of pain. Pt has been stable throughout the night        Goal: Adult Individualization and Mutuality  Outcome: Ongoing (interventions implemented as appropriate)  Goal: Discharge Needs Assessment  Outcome: Ongoing (interventions implemented as appropriate)    Problem: Liver Failure, Acute/Chronic (Adult)  Goal: Signs and Symptoms of Listed Potential Problems Will be Absent or Manageable (Liver Failure, Acute/Chronic)  Outcome: Ongoing (interventions implemented as appropriate)    Problem: Respiratory Insufficiency (Adult)  Goal: Acid/Base Balance  Outcome: Ongoing (interventions implemented as appropriate)  Goal: Effective Ventilation  Outcome: Ongoing (interventions implemented as appropriate)

## 2017-08-03 NOTE — CONSULTS
Tam Ayon DO,[facility]  Gastroenterology  Hepatology  Endoscopy  Board Certified in Internal Medicine and gastroenterology  [address], [address]  [place]. [address]  - [phone]   F - [phone]     GASTROENTEROLOGY CONSULT NOTE   TAM AYON DO.         SUBJECTIVE:   8/2/2017    Name: Joseline Parnell  DOD: 1980    REASON FOR CONSULT: Ascites    Chief Complaint:     Chief Complaint   Patient presents with   • Abdominal Pain       Subjective : Generalized abdominal pain and distention of the abdomen requiring paracentesis    Patient is 37 y.o. 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  The patient has generalized abdominal pain.  There has been no other complications of the patient's liver disease.    The patient has recently had a large-volume paracentesis performed by Dr Pendleton in [place].  Today the patient had a large volume paracentesis.    I am uncertain if the patient has had a EGD to evaluate for esophageal    She has been actively drinking until 2 months ago.  She said that she was a very heavy drinker and would not quantify the amount of alcohol that she would drink..      ROS/HISTORY/ CURRENT MEDICATIONS/OBJECTIVE/VS/PE:   Review of Systems:   Review of Systems   Constitutional: Positive for activity change, appetite change, fatigue and unexpected weight change.   HENT: Negative.    Eyes: Negative.    Respiratory: Positive for shortness of breath and wheezing.    Cardiovascular: Positive for chest pain, palpitations and leg swelling.   Gastrointestinal: Positive for abdominal pain and nausea.   Endocrine: Positive for cold intolerance and heat intolerance.   Genitourinary: Negative.    Musculoskeletal: Positive for arthralgias, back pain, joint swelling and neck pain.   Skin: Positive for pallor.   Allergic/Immunologic: Positive for  immunocompromised state.   Neurological: Positive for dizziness and light-headedness.   Hematological: Bruises/bleeds easily.   Psychiatric/Behavioral: The patient is nervous/anxious.        History:     Past Medical History:   Diagnosis Date   • Abscess of buttock    • Acute bronchitis    • Acute maxillary sinusitis    • Acute otitis media    • Acute sinusitis    • Astigmatism    • Blister of vulva with infection    • Calf pain    • Cellulitis      both axilla   • Cough    • Cyst of ovary     history   • Diabetes mellitus screening    • Encounter for gynecological examination    • External hemorrhoids    • Hemorrhoids    • Hip pain    • Knee pain    • Migraine    • Nystagmus    • Pancreatitis    • Screening for hyperlipidemia    • Seborrheic dermatitis     Left ear   • Serous otitis media    • Streptococcal sore throat    • Temporomandibular joint disorder    • Thyroid disorder screening    • Venereal disease screening      Past Surgical History:   Procedure Laterality Date   • INJECTION OF MEDICATION  03/12/2015    Bicillin LA 1.2 (1)      • PAP SMEAR  01/15/2008    PAP SMEAR (1)      • PARACENTESIS  07/2017    done at [facility]   • TUBAL ABDOMINAL LIGATION      Tubal ligation (1)        Family History   Problem Relation Age of Onset   • Nystagmus Mother    • Blindness Mother    • Asthma Mother    • Hypertension Mother    • Nystagmus Other    • Cancer Father    • Alcohol abuse Paternal Uncle      Social History   Substance Use Topics   • Smoking status: Current Every Day Smoker     Packs/day: 0.25     Years: 12.00     Types: Cigarettes   • Smokeless tobacco: Never Used      Comment: 1/4 ppd  (3/12/15)   • Alcohol use No     Prescriptions Prior to Admission   Medication Sig Dispense Refill Last Dose   • mirtazapine (REMERON) 15 MG tablet Take 15 mg by mouth Every Night.   Taking   • cetirizine (zyrTEC) 10 MG tablet   0 Taking   • cyclobenzaprine (FLEXERIL) 10 MG tablet   0 Taking   • cyclobenzaprine (FLEXERIL) 10 MG  tablet Take 1 tablet by mouth 2 (Two) Times a Day. 60 tablet 0 Not Taking   • docusate sodium (COLACE) 100 MG capsule Take 100 mg by mouth 2 (Two) Times a Day. RA COL-RITE   Taking   • fluticasone (FLONASE) 50 MCG/ACT nasal spray into each nostril Daily.   Taking   • ibuprofen (ADVIL,MOTRIN) 800 MG tablet   0 Taking   • meloxicam (MOBIC) 15 MG tablet take 1 tablet by mouth once daily  0 Not Taking   • omeprazole (priLOSEC) 20 MG capsule Take 1 capsule by mouth Daily.  0 Taking   • PATIENT SUPPLIED MEDICATION Take 1,000 Units by mouth Daily. OTC Vitamin D3 1000 units once daily   Taking   • spironolactone (ALDACTONE) 50 MG tablet Take 1 tablet by mouth Daily.  0 Taking   • traMADol (ULTRAM) 50 MG tablet take 1 tablet by mouth every 6 hours if needed  0 Taking   • venlafaxine (EFFEXOR) 75 MG tablet Take 75 mg by mouth Every Morning.   Taking     Allergies:  Lexapro [escitalopram oxalate]    I have reviewed the patients medical history, surgical history and family history in the available medical record system.     Current Medications:     Current Facility-Administered Medications   Medication Dose Route Frequency Provider Last Rate Last Dose   • acetaminophen (TYLENOL) tablet 650 mg  650 mg Oral Q4H PRN Mo Garcia MD       • bisacodyl (DULCOLAX) suppository 10 mg  10 mg Rectal Daily PRN Mo Garcia MD       • cyclobenzaprine (FLEXERIL) tablet 10 mg  10 mg Oral BID Mo Garcia MD   10 mg at 08/02/17 1828   • dextrose (D50W) solution 50 mL  50 mL Intravenous Q1H PRN Diogo Dickerson MD   50 mL at 08/02/17 0034   • docusate sodium (COLACE) capsule 100 mg  100 mg Oral BID Mo Garcia MD       • famotidine (PEPCID) tablet 20 mg  20 mg Oral BID Mo Garcia MD   20 mg at 08/02/17 1828   • furosemide (LASIX) injection 20 mg  20 mg Intravenous Q12H Roc Barnes MD   20 mg at 08/02/17 1345   • heparin (porcine) 5000 UNIT/ML injection 5,000 Units  5,000 Units  Subcutaneous Q12H Mo Garcia MD   5,000 Units at 08/02/17 0957   • hydrALAZINE (APRESOLINE) injection 10 mg  10 mg Intravenous Q6H PRN Mo Garcia MD       • LORazepam (ATIVAN) injection 1 mg  1 mg Intravenous Q6H PRN Mo Garcia MD       • magnesium hydroxide (MILK OF MAGNESIA) suspension 2400 mg/10mL 10 mL  10 mL Oral Daily PRN Mo Garcia MD       • Morphine sulfate (PF) injection 1 mg  1 mg Intravenous Q4H PRN Mo Garcia MD   1 mg at 08/02/17 1011   • nicotine (NICODERM CQ) 21 MG/24HR patch 1 patch  1 patch Transdermal Q24H Mo Garcia MD       • ondansetron (ZOFRAN) tablet 4 mg  4 mg Oral Q6H PRN Mo Garcia MD        Or   • ondansetron ODT (ZOFRAN-ODT) disintegrating tablet 4 mg  4 mg Oral Q6H PRN Mo Garcia MD        Or   • ondansetron (ZOFRAN) injection 4 mg  4 mg Intravenous Q6H PRN Mo Gracia MD       • pantoprazole (PROTONIX) EC tablet 40 mg  40 mg Oral JULIAN Garcia MD   40 mg at 08/02/17 0632   • sodium chloride (HYPERTONIC) 3 % infusion  20 mL/hr Intravenous Continuous Roc Barnes MD 20 mL/hr at 08/02/17 1730 20 mL/hr at 08/02/17 1730   • sodium chloride 0.9 % flush 1-10 mL  1-10 mL Intravenous PRN Mo Garcia MD       • sodium chloride 0.9 % flush 10 mL  10 mL Intravenous PRN Diogo Dickerson MD       • sodium chloride 0.9 % infusion  50 mL/hr Intravenous Continuous Roc Barnes MD   Stopped at 08/02/17 1659   • venlafaxine (EFFEXOR) tablet 75 mg  75 mg Oral QAELIZABETH Garcia MD   75 mg at 08/02/17 0631   • zolpidem (AMBIEN) tablet 5 mg  5 mg Oral Nightly PRN Roc Barnes MD           Objective     Physical Exam:   Temp:  [96.6 °F (35.9 °C)-98.3 °F (36.8 °C)] 98.1 °F (36.7 °C)  Heart Rate:  [104-126] 110  Resp:  [18-24] 20  BP: (111-141)/() 119/86    Physical Exam:  General Appearance:    Alert, cooperative, in no acute distress   Head:     Normocephalic, without obvious abnormality, atraumatic   Eyes:            Lids and lashes normal, conjunctivae and sclerae normal, no   icterus, no pallor, corneas clear, PERRLA   Ears:    Ears appear intact with no abnormalities noted   Throat:   No oral lesions, no thrush, oral mucosa moist   Neck:   No adenopathy, supple, trachea midline, no thyromegaly, no     carotid bruit, no JVD   Back:     No kyphosis present, no scoliosis present, no skin lesions,       erythema or scars, no tenderness to percussion or                   palpation,   range of motion normal   Lungs:     Clear to auscultation,respirations regular, even and                   unlabored    Heart:    Regular rhythm and normal rate, normal S1 and S2, no            murmur, no gallop, no rub, no click   Breast Exam:    Deferred   Abdomen:     Normal bowel sounds, no masses, no organomegaly, soft        non-tender, non-distended, no guarding, no rebound                 tenderness   Genitalia:    Deferred   Extremities:   Moves all extremities well, no edema, no cyanosis, no              redness   Pulses:   Pulses palpable and equal bilaterally   Skin:   No bleeding, bruising or rash   Lymph nodes:   No palpable adenopathy   Neurologic:   Cranial nerves 2 - 12 grossly intact, sensation intact, DTR        present and equal bilaterally      Results Review:     Lab Results   Component Value Date    WBC 14.11 (H) 08/01/2017    HGB 8.3 (L) 08/01/2017    HCT 24.0 (L) 08/01/2017    PLT 87 (L) 08/01/2017       Results from last 7 days  Lab Units 08/01/17  2336   ALK PHOS U/L 137*   ALT (SGPT) U/L 93*   AST (SGOT) U/L 147*       Results from last 7 days  Lab Units 08/01/17  2336   BILIRUBIN mg/dL 1.2   ALK PHOS U/L 137*     Lipase   Date Value Ref Range Status   08/01/2017 421 (H) 23 - 300 U/L Final     Lab Results   Component Value Date    INR 0.92 08/01/2017          Radiology Review:  Imaging Results (last 72 hours)     Procedure Component Value Units  Date/Time    XR Chest 1 View [[id number]] Collected:  08/01/17 2231     Updated:  08/01/17 2250    Narrative:         Chest single view on  8/1/2017     CLINICAL INDICATION: Shortness of breath    COMPARISON: None    FINDINGS: This is a low-volume inspiration film. There is linear  atelectasis or scarring in the lung bases. The lungs are  otherwise clear. Cardiac, hilar and mediastinal contours are  within normal limits. Pulmonary vascularity is within normal  limits. No bony abnormality is noted.      Impression:       No acute disease.    Electronically signed by:  Ge Haney  8/1/2017 10:49 PM  CDT Workstation: [id number]    XR Chest Post CVA Port [[id number]] Collected:  08/02/17 0005     Updated:  08/02/17 0036    Narrative:         Chest single view on  8/1/2017     CLINICAL INDICATION: Central line placement    COMPARISON: 8/1/2017    FINDINGS: New right IJ central venous catheter tip is in the  right atrium. There is no pneumothorax. There is continued linear  atelectasis or scarring in the lung bases. The lungs are  otherwise clear. Cardiac, hilar and mediastinal contours are  within normal limits. Pulmonary vascularity is within normal  limits.      Impression:       New right IJ catheter tip in the right atrium with no  pneumothorax and otherwise no significant change.    Electronically signed by:  Ge Haney  8/2/2017 12:35 AM  CDT Workstation: [id number]    XR Chest 1 View [[id number]] Collected:  08/02/17 0128     Updated:  08/02/17 0148    Narrative:         Chest single view on  8/2/2017     CLINICAL INDICATION: Central line adjustment    COMPARISON: 8/1/2017    FINDINGS: Right IJ central venous catheter tip now is at the  cavoatrial junction. This remains a low-volume inspiration film  with mild linear atelectasis or scarring in the lung bases. Lungs  are otherwise clear. Cardiac, hilar and mediastinal contours are  within normal limits.      Impression:       Proximal adjustment  of the right IJ catheter as above  with otherwise no significant change.    Electronically signed by:  Ge Haney  8/2/2017 1:47 AM CDT  Workstation: [id number]     Paracentesis [[id number]] Collected:  08/02/17 1435    Specimen:  Body Fluid Updated:  08/02/17 1654    Narrative:         PROCEDURE: Ultrasound-guided paracentesis    COMPARISON: None    HISTORY: Ascites    TECHNIQUE:  The risks, benefits and alternatives were explained to the  patient who had ample opportunity to ask questions. The patient  agreed to proceed and informed consent was obtained.    An adequate fluid pocket was identified in the right lower  quadrant. The site was marked then prepped and draped in sterile  fashion. Approximately 5 mL of 2% lidocaine solution was used for  local superficial and deep anesthesia. A 5 Syriac Yueh needle was  inserted into the peritoneal cavity under sonographic guidance.    FINDINGS:   Approximately 10.8 liters of clear, serous fluid was removed.  There were no immediate post procedure complications.      Impression:       CONCLUSION:    Successful ultrasound-guided paracentesis, as described above.    Electronically signed by:  Rakan Moraes MD  8/2/2017 4:52 PM CDT  Workstation: [id number]           I reviewed the patient's new clinical results.  I reviewed the patient's new imaging results and agree with the interpretation.     ASSESSMENT/PLAN:   ASSESSMENT:   1.  Cirrhosis secondary to alcohol presumed.  Michael classification C.  Meld score 7  2.  Ascites secondary to cirrhosis.  Rule out portal vein thrombosis  3.  Suspect type II hepatorenal disease  4.  Anemia secondary to chronic disease as well as probable gastrointestinal blood loss  5.  Thrombocytopenia secondary to splenic sequestration  6.  Protein calorie malnutrition    PLAN:   1.  The patient's hypertonic saline will increase the patient's ascites but that needs to be done to try to normalize the patients sodium.  The patient will  need to have repeated paracentesis done  2.  I would start the patient on Midodrine as well as octreotide  3.  Albumin infusions  4.  Will need EGD in the future to look for esophageal varices  5.  Workup of chronic liver disease.  I suspect that that has been done by Dr. Pendleton but we will make sure about this.  6.  Doppler ultrasound of the portal vein to exclude the possibility of portal vein thrombosis      Chandrakant You DO  08/02/17  7:19 PM

## 2017-08-03 NOTE — PROGRESS NOTES
Discharge Planning Assessment  [facility]     Patient Name: Joseline Parnell  MRN: [id number]  Today's Date: 8/3/2017    Admit Date: 8/1/2017          Discharge Needs Assessment       08/03/17 1037    Living Environment    Lives With alone    Living Arrangements apartment    Transportation Available car;family or friend will provide    Living Environment Comment Pt typically lives at home alone. pt has three children ages 23, 20, 18. They stay with pt periodically. Pt has been staying at her mother's home for the past week due to her condition/weakness.    Living Environment    Provides Primary Care For no one    Primary Care Provided By parent(s)    Quality Of Family Relationships supportive;helpful    Living Arrangement Comments According to pt's mother, pt will return to her home once d/c from hospital.    Discharge Needs Assessment    Concerns To Be Addressed adjustment to diagnosis/illness concerns;substance/tobacco abuse/use concerns;compliance issue concerns    Concerns Comments Pt would not disclose how much alcohol she consumes.    Outpatient/Agency/Support Group Needs outpatient substance abuse treatment (specify)    Community Agency Name(S) Pt has used [facility] in past, no services currently.    Equipment Currently Used at Home cane, straight;crutches    Equipment Needed After Discharge --   Pt request BSC, W/C    Discharge Facility/Level Of Care Needs home with home health    Current Discharge Risk chronically ill;substance abuse    Discharge Disposition home healthcare service            Discharge Plan       08/03/17 1053    Case Management/Social Work Plan    Plan Home with Home Health    Patient/Family In Agreement With Plan yes    Additional Comments LSW assesment complete. pt has been residing with her mother for the past week due to her current condition. Pt reports having good family support that is willing to assist with needs. According to her mother pt will return to her  home at d/c. pt is agreeable to home health follow up. pt also request a BSC and W/C. LSW awaiting additional recomendations from MD and therapy. LSW/case mgt will follow up as consulted and complete arrangements as ordered.      08/03/17 1028    Case Management/Social Work Plan    Additional Comments Team rounding completed- Pt had 10.8 L ascites removed yesterday  Hgb 8.3 on admit and down to 6.9 transfuse PRBC's   GI and Nephrology consulted   Kaitlyn AVILA RNCM        Discharge Placement     No information found                Demographic Summary       08/03/17 1033    Referral Information    Admission Type inpatient    Referral Source physician    Reason For Consult discharge planning    Record Reviewed medical record    Contact Information    Permission Granted to Share Information With     Primary Care Physician Information    Name Dr Dunham            Functional Status       08/03/17 1034    Functional Status Prior    Ambulation 1-->assistive equipment    Transferring 1-->assistive equipment    Toileting 0-->independent    Bathing 0-->independent    Dressing 0-->independent    Eating 0-->independent    Communication 0-->understands/communicates without difficulty    Swallowing 0-->swallows foods/liquids without difficulty    IADL    Medications independent    Meal Preparation independent;assistive person    Housekeeping independent;assistive person    Laundry independent;assistive person    Shopping independent;assistive person    Oral Care independent    IADL Comments Pt is typically independent with IADL however for the past week she has been staying at her mother's residence and she has been assisting with needs.    Activity Tolerance    Current Activity Limitations weakness severe, generalized    Usual Activity Tolerance moderate    Current Activity Tolerance poor    Cognitive/Perceptual/Developmental    Current Mental Status/Cognitive Functioning no deficits noted    Recent Changes in Mental  Status/Cognitive Functioning no changes    Developmental Stage (Eriksson's Stages of Development) Stage 7 (35-65 years/Middle Adulthood) Generativity vs. Stagnation            Psychosocial     None            Abuse/Neglect     None            Legal     None            Substance Abuse     None            Patient Forms     None          TRACY Cheema

## 2017-08-04 NOTE — PLAN OF CARE
Problem: Patient Care Overview (Adult)  Goal: Plan of Care Review  Outcome: Ongoing (interventions implemented as appropriate)    08/04/17 1346   Coping/Psychosocial Response Interventions   Plan Of Care Reviewed With patient;caregiver   Patient Care Overview   Progress no change   Outcome Evaluation   Outcome Summary/Follow up Plan Pt with hx of Liver Cirrhosis. She was admitted with significant Ascites and a Paracentesis was done with the removal of 10.8 liters. Poor po intake and appetite. Will add mighty shakes bid and monitor. Her Nh3 was wnl         Problem: Liver Failure, Acute/Chronic (Adult)  Goal: Signs and Symptoms of Listed Potential Problems Will be Absent or Manageable (Liver Failure, Acute/Chronic)  Outcome: Ongoing (interventions implemented as appropriate)    08/04/17 1346   Liver Failure, Acute/Chronic   Problems Assessed (Liver Failure, Acute/Chronic) malnutrition   Problems Present (Liver Failure, Acute/Chronic) malnutrition

## 2017-08-04 NOTE — CONSULTS
"Adult Nutrition  Assessment    Patient Name:  Joseline Parnell  YOB: 1980  MRN: [id number]  Admit Date:  8/1/2017    Assessment Date:  8/4/2017          Reason for Assessment       08/04/17 1340    Reason for Assessment    Reason For Assessment/Visit multidisciplinary rounds;nurse/nurse practitioner consult    Hepatic Cirrhosis                Nutrition/Diet History       08/04/17 1341    Nutrition/Diet History    Typical Food/Fluid Intake Pt claims that she has not been eating well for several weeks.  She does not know about wt loss              Labs/Tests/Procedures/Meds       08/04/17 1341    Labs/Tests/Procedures/Meds    Labs/Tests Review Reviewed;Na+;K+;Creat;T bilirubin;Alb;NH3    Medication Review Reviewed, pertinent            Physical Findings       08/04/17 1341    Physical Findings/Assessment    Additional Documentation Physical Appearance (Group)    Physical Appearance    Gastrointestinal ascites;abdominal distention;nausea            Estimated/Assessed Needs       08/04/17 1343    Calculation Measurements    Weight Used For Calculations 54.4 kg (120 lb)    Height Used for Calculations 1.651 m (5' 5\")    Estimated/Assessed Energy Needs    Energy Need Method Kcal/kg    kcal/kg 30    30 Kcal/Kg (kcal) 1632.96    Estimated Kcal Range  1650    Estimated/Assessed Protein Needs    Weight Used for Protein Calculation 54.4 kg (120 lb)    Protein (gm/kg) 1.0    1.0 Gm Protein (gm) 54.43    Estimated Protein Range 54    Estimated/Assessed Fluid Needs    Fluid Need Method RDA method    RDA Method (mL)  1650            Nutrition Prescription Ordered       08/04/17 1344    Nutrition Prescription PO    Current PO Diet Regular    Fluid Consistency Thin    Common Modifiers Cardiac            Evaluation of Received Nutrient/Fluid Intake       08/04/17 1344    EN Evaluation    Number of Days EN Intake Evaluated Insufficient Data            Comments:  Pt with hx of Liver Cirrhosis. She was admitted with " significant Ascites and a Paracentesis was done with the removal of 10.8 liters. Poor po intake and appetite. Will add mighty shakes bid and monitor. Her Nh3 was wnl.  Bilirubin elevated at 1.4.  Pt noted to have Anemia with Alcohol induced bone marrow suppression as well as blood loss.   RD will monitor.          Electronically signed by:  Akosua Motley RD  08/04/17 1:48 PM

## 2017-08-04 NOTE — PLAN OF CARE
Problem: Patient Care Overview (Adult)  Goal: Plan of Care Review  Outcome: Ongoing (interventions implemented as appropriate)    08/04/17 0625   Coping/Psychosocial Response Interventions   Plan Of Care Reviewed With patient   Outcome Evaluation   Outcome Summary/Follow up Plan pt has been stable throughout night. Pt voided 1800. stated she feels stronger today. continues with distended abdomen. potassium replacement initiated this AM - platelts continue to stay low.- MD notified       Goal: Discharge Needs Assessment  Outcome: Ongoing (interventions implemented as appropriate)    Problem: Liver Failure, Acute/Chronic (Adult)  Goal: Signs and Symptoms of Listed Potential Problems Will be Absent or Manageable (Liver Failure, Acute/Chronic)  Outcome: Outcome(s) achieved Date Met:  08/04/17    Problem: Respiratory Insufficiency (Adult)  Goal: Acid/Base Balance  Outcome: Ongoing (interventions implemented as appropriate)  Goal: Effective Ventilation  Outcome: Ongoing (interventions implemented as appropriate)

## 2017-08-04 NOTE — PROGRESS NOTES
"[facility]  [address] [place] [address]. [address]   - [phone]  F - [phone]     Progress Note          PATIENT  DEMOGRAPHICS   PATIENT NAME: Joseline Parnell                      PHYSICIAN: Roc Barnes MD  : 1980  MRN: [id number]   LOS: 2 days    Patient Care Team:  ERASMO Alvarez as PCP - General (Family Medicine)  Subjective   SUBJECTIVE   Back pain         Objective   OBJECTIVE   Vital Signs  Temp:  [97.7 °F (36.5 °C)-98.7 °F (37.1 °C)] 98.6 °F (37 °C)  Heart Rate:  [122-144] 144  Resp:  [16-18] 18  BP: ()/(57-87) 113/77    Flowsheet Rows         First Filed Value    Admission Height  65\" (165.1 cm) Documented at [address]    Admission Weight  120 lb (54.4 kg) Documented at 2017 0046           I/O last 3 completed shifts:  In: 1390.2 [P.O.:125; I.V.:478; Blood:587.2; IV Piggyback:200]  Out: 5650 [Urine:5400; Stool:250]    PHYSICAL EXAM    Physical Exam   Constitutional: She is oriented to person, place, and time. She appears well-developed.   HENT:   Head: Normocephalic.   Eyes: Pupils are equal, round, and reactive to light.   Cardiovascular: Normal rate, regular rhythm and normal heart sounds.    Pulmonary/Chest: Effort normal and breath sounds normal.   Abdominal: Soft. Bowel sounds are normal. She exhibits distension.   Neurological: She is alert and oriented to person, place, and time.       RESULTS   Results Review:      Results from last 7 days  Lab Units 17  0518 17  1855 17  1153 17  0401  17  2336   SODIUM mmol/L 127* 126* 125* 125*  < > 119*   POTASSIUM mmol/L 3.0* 3.7  --  3.1*  --  4.3   CHLORIDE mmol/L 92*  --   --  88*  --  86*   CO2 mmol/L 30.0  --   --  30.0  --  22.0   BUN mg/dL 10  --   --  14  --  26*   CREATININE mg/dL 0.44*  --   --  0.53  --  0.56   CALCIUM mg/dL 7.0*  --   --  6.6*  --  6.4*   BILIRUBIN mg/dL 1.4*  --   --  1.1  --  1.2   ALK PHOS U/L 78  --   --  77  --  137*   ALT (SGPT) " U/L 63*  --   --  68*  --  93*   AST (SGOT) U/L 72*  --   --  93*  --  147*   GLUCOSE mg/dL 73  --   --  70  --  45*   < > = values in this interval not displayed.    Estimated Creatinine Clearance: 142.9 mL/min (by C-G formula based on Cr of 0.44).      Results from last 7 days  Lab Units 08/04/17  0518 08/03/17  0401   MAGNESIUM mg/dL 1.4* 1.8         Results from last 7 days  Lab Units 08/02/17  1115   URIC ACID mg/dL 6.8         Results from last 7 days  Lab Units 08/04/17  0518 08/03/17  1855 08/03/17  1153 08/03/17  0804 08/03/17  0401   WBC 10*3/mm3 5.16 4.69 3.82 4.60 5.18   HEMOGLOBIN g/dL 9.6* 9.3* 5.9* 6.9* 6.8*   PLATELETS 10*3/mm3 40* 45* 50* 55* 57*         Results from last 7 days  Lab Units 08/01/17  2336   INR  0.92         Imaging Results (last 24 hours)     Procedure Component Value Units Date/Time    US Abdominal Vascular Limited [[id number]] Collected:  08/03/17 1508     Updated:  08/04/17 0809    Narrative:       DATE OF EXAM: 8/3/2017 3:07 PM CDT    PROCEDURE: US ABDOMEN    INDICATION FOR PROCEDURE: 37 years old patient presents for  evaluation of alcoholic cirrhosis with ascites.  ICD 10 codes:   K70.31.    TECHNIQUE: Multiple static grayscale images are obtained  transabdominally.    COMPARISON:  No comparable studies are available for comparison  at the time of dictation.    FINDINGS: Images of the liver reveal heterogeneous echogenicity  consistent with history of cirrhosis..  There are no dilated  intrahepatic biliary ducts. Color Doppler documents hepatopedal  blood flow within the portal vein. Peak systolic velocity within  the main portal vein is 27.2 cm/s.    The gallbladder is distended..  No gallstones are visible.  There  is no pericholecystic fluid or gallbladder wall thickening.   Common bile duct measures 3.7 mm  in greatest transverse  dimension.    The right kidney has a normal sonographic appearance without  evidence for perinephric fluid or hydronephrosis. The right  kidney  measures 5.5 x 6.0 cm in transverse dimension. Right  kidney was not identified in its entirety..    The pancreas is obscured by bowel gas..    The left kidney has a grossly normal appearance without evidence  for perinephric fluid, mass or hydronephrosis. Left kidney  measures  10.1 x 5.3 x 5.2 cm.    The spleen has a grossly normal appearance..    Abdominal aorta is partially visualized and has a normal tapered  appearance..    Inferior vena cava is obscured..    Moderate amount of ascites is visualized.      Impression:       CONCLUSION:      1.  Liver is echogenic suggesting cirrhosis.   2.  Large amount of ascites.   3.  Nonvisualization of pancreas and incomplete visualization of  the right kidney.   4.  Patent portal vein.    Electronically signed by:  Beth Polanco MD  8/3/2017 4:26 PM CDT  Workstation: [employer]    US Abdomen Complete [[id number]] Collected:  08/03/17 1507     Updated:  08/04/17 0809    Narrative:       DATE OF EXAM: 8/3/2017 3:07 PM CDT    PROCEDURE: US ABDOMEN    INDICATION FOR PROCEDURE: 37 years old patient presents for  evaluation of alcoholic cirrhosis with ascites.  ICD 10 codes:   K70.31.    TECHNIQUE: Multiple static grayscale images are obtained  transabdominally.    COMPARISON:  No comparable studies are available for comparison  at the time of dictation.    FINDINGS: Images of the liver reveal heterogeneous echogenicity  consistent with history of cirrhosis..  There are no dilated  intrahepatic biliary ducts. Color Doppler documents hepatopedal  blood flow within the portal vein. Peak systolic velocity within  the main portal vein is 27.2 cm/s.    The gallbladder is distended..  No gallstones are visible.  There  is no pericholecystic fluid or gallbladder wall thickening.   Common bile duct measures 3.7 mm  in greatest transverse  dimension.    The right kidney has a normal sonographic appearance without  evidence for perinephric fluid or hydronephrosis. The right  kidney  measures 5.5 x 6.0 cm in transverse dimension. Right  kidney was not identified in its entirety..    The pancreas is obscured by bowel gas..    The left kidney has a grossly normal appearance without evidence  for perinephric fluid, mass or hydronephrosis. Left kidney  measures  10.1 x 5.3 x 5.2 cm.    The spleen has a grossly normal appearance..    Abdominal aorta is partially visualized and has a normal tapered  appearance..    Inferior vena cava is obscured..    Moderate amount of ascites is visualized.      Impression:       CONCLUSION:      1.  Liver is echogenic suggesting cirrhosis.   2.  Large amount of ascites.   3.  Nonvisualization of pancreas and incomplete visualization of  the right kidney.   4.  Patent portal vein.    Electronically signed by:  Beth Polanco MD  8/3/2017 4:26 PM CDT  Workstation: [facility]           MEDICATIONS      cyclobenzaprine 10 mg Oral BID   docusate sodium 100 mg Oral BID   famotidine 20 mg Oral BID   furosemide 20 mg Intravenous TID   heparin (porcine) 5,000 Units Subcutaneous Q12H   midodrine 2.5 mg Oral TID AC   nicotine 1 patch Transdermal Q24H   octreotide 50 mcg Intravenous TID   pantoprazole 40 mg Oral QAM   venlafaxine 75 mg Oral QAM       sodium chloride 25 mL/hr Last Rate: 25 mL/hr (08/03/17 0514)       Assessment/Plan   ASSESSMENT / PLAN    Principal Problem:    Alcoholic cirrhosis of liver with ascites  Active Problems:    Chronic midline low back pain    Chronic pelvic pain in female    Ascites due to alcoholic cirrhosis    Moderate episode of recurrent major depressive disorder    1.hyponatremia.  This is likely secondary to hypervolemic state in the setting of chronic liver disease.  Also she is on spironolactone which is recently started and that can drop the sodium.  She is also on mirtazapine which can cause SIADH.     Urine na is low suggesting heparo renal / volume depletion. Didn't respond with 0.9 and therefore 3% now. Increase 3% to 25 cc/hr. Check na  later.  tsh and uric acid normal . keep Lasix 20 mg IV twice a day. Tolvaptan is contraindicated because of liver cirrhosis.      2.alcoholic liver cirrhosis with marked ascites recent paracentesis about a month ago.  Patient has now abdominal distention with left lower chest pain.  s/p paracentesis had 10 L removed. Keep sandostatin midodrine and albumin.      3.chronic back pain/depression    4. Anemia s/p  2 U PRBC today    5. Hypokalemia replace today replace mg    6. Hypocalcemia with low albumin - check vitamin d              This document has been electronically signed by Roc Barnes MD on August 4, 2017 11:14 AM

## 2017-08-04 NOTE — PROGRESS NOTES
[facility]  INPATIENT PROGRESS NOTE    Length of Stay: 2  Date of Admission: 8/1/2017  Primary Care Physician: ERASMO Alvarez    Subjective   Chief Complaint:   Chief Complaint   Patient presents with   • Abdominal Pain       HPI:  HPI      Review of Systems   Fatigue .weakness    All pertinent negatives and positives are as above. All other systems have been reviewed and are negative unless otherwise stated.     Objective    Temp:  [97.7 °F (36.5 °C)-98.7 °F (37.1 °C)] 98.6 °F (37 °C)  Heart Rate:  [122-144] 144  Resp:  [16-18] 18  BP: ()/(57-87) 113/77  Physical Exam   Constitutional: She appears lethargic. She appears cachectic. She appears ill.   Cardiovascular: S1 normal and normal heart sounds.  Exam reveals no gallop.    No murmur heard.  Pulmonary/Chest: Effort normal and breath sounds normal. She has no wheezes. She has no rhonchi. She has no rales.   Abdominal: Soft. Bowel sounds are normal. She exhibits distension and ascites.       Vascular Status -  Her exam exhibits right foot edema. Her exam exhibits left foot edema.  Neurological: She appears lethargic. A sensory deficit is present. No cranial nerve deficit.           Results Review:  I have reviewed the labs, radiology results, and diagnostic studies.    Laboratory Data:   Lab Results (last 24 hours)     Procedure Component Value Units Date/Time    CBC & Differential [[id number]] Collected:  08/03/17 1153    Specimen:  Blood Updated:  08/03/17 1307    Narrative:       The following orders were created for panel order CBC & Differential.  Procedure                               Abnormality         Status                     ---------                               -----------         ------                     Manual Differential[[id number]]                                                         Scan Slide[[id number]]                                       Final result                 CBC Auto Differential[[id number]]        Abnormal            Final result                 Please view results for these tests on the individual orders.    Scan Slide [[id number]] Collected:  08/03/17 1153    Specimen:  Blood Updated:  08/03/17 1307     Anisocytosis Slight/1+     Hypochromia Slight/1+      Few target cells observed        WBC Morphology Normal     Platelet Estimate Decreased    Potassium [[id number]]  (Normal) Collected:  08/03/17 1855    Specimen:  Blood from Blood, Venous Line Updated:  08/03/17 1921     Potassium 3.7 mmol/L     Sodium [[id number]]  (Abnormal) Collected:  08/03/17 1855    Specimen:  Blood from Blood, Venous Line Updated:  08/03/17 1921     Sodium 126 (L) mmol/L     CBC (No Diff) [[id number]]  (Abnormal) Collected:  08/03/17 1855    Specimen:  Blood from Blood, Venous Line Updated:  08/03/17 1933     WBC 4.69 10*3/mm3      RBC 3.08 (L) 10*6/mm3      Hemoglobin 9.3 (L) g/dL       PT RECEIVED 2 UNITS RBCS TODAY        Hematocrit 27.2 (L) %      MCV 88.3 fL      MCH 30.2 pg      MCHC 34.2 g/dL      RDW 14.7 (H) %      RDW-SD 47.1 (H) fl      MPV 10.5 fL      Platelets 45 (L) 10*3/mm3       PREVIOUSLY CALLED RESULTS. SLIDE SCAN WITHIN THE LAST 3 DAYS       Vitamin D 25 Hydroxy [[id number]] Collected:  08/04/17 0518    Specimen:  Blood Updated:  08/04/17 0518    Magnesium [[id number]]  (Abnormal) Collected:  08/04/17 0518    Specimen:  Blood Updated:  08/04/17 0537     Magnesium 1.4 (L) mg/dL     CBC & Differential [[id number]] Collected:  08/04/17 0518    Specimen:  Blood Updated:  08/04/17 0537    Narrative:       The following orders were created for panel order CBC & Differential.  Procedure                               Abnormality         Status                     ---------                               -----------         ------                     Scan Slide[[id number]]                                                                  CBC Auto Differential[[id number]]        Abnormal             Final result                 Please view results for these tests on the individual orders.    CBC Auto Differential [[id number]]  (Abnormal) Collected:  08/04/17 0518    Specimen:  Blood Updated:  08/04/17 0537     WBC 5.16 10*3/mm3      RBC 3.20 (L) 10*6/mm3      Hemoglobin 9.6 (L) g/dL      Hematocrit 27.9 (L) %      MCV 87.2 fL      MCH 30.0 pg      MCHC 34.4 g/dL      RDW 14.8 (H) %      RDW-SD 46.5 (H) fl      MPV 11.4 fL      Platelets 40 (L) 10*3/mm3      Neutrophil % 93.2 (H) %      Lymphocyte % 5.4 (L) %      Monocyte % 0.4 %      Eosinophil % 0.2 %      Basophil % 0.2 %      Immature Grans % 0.6 (H) %      Neutrophils, Absolute 4.81 10*3/mm3      Lymphocytes, Absolute 0.28 (L) 10*3/mm3      Monocytes, Absolute 0.02 10*3/mm3      Eosinophils, Absolute 0.01 10*3/mm3      Basophils, Absolute 0.01 10*3/mm3      Immature Grans, Absolute 0.03 (H) 10*3/mm3     Comprehensive Metabolic Panel [[id number]]  (Abnormal) Collected:  08/04/17 0518    Specimen:  Blood Updated:  08/04/17 0537     Glucose 73 mg/dL      BUN 10 mg/dL      Creatinine 0.44 (L) mg/dL      Sodium 127 (L) mmol/L      Potassium 3.0 (L) mmol/L      Chloride 92 (L) mmol/L      CO2 30.0 mmol/L      Calcium 7.0 (L) mg/dL      Total Protein 4.4 (L) g/dL      Albumin 2.10 (L) g/dL      ALT (SGPT) 63 (H) U/L      AST (SGOT) 72 (H) U/L      Alkaline Phosphatase 78 U/L      Total Bilirubin 1.4 (H) mg/dL      eGFR  African Amer 194 (H) mL/min/1.73      Globulin 2.3 gm/dL      A/G Ratio 0.9 (L) g/dL      BUN/Creatinine Ratio 22.7     Anion Gap 5.0 mmol/L     Bilirubin, Direct [[id number]]  (Normal) Collected:  08/04/17 0518    Specimen:  Blood Updated:  08/04/17 0537     Bilirubin, Direct 0.0 mg/dL     Urine Culture [[id number]] Collected:  08/02/17 1854    Specimen:  Urine from Urine, Clean Catch Updated:  08/04/17 0602     Urine Culture Mixed Culture    Narrative:         Specimen contains mixed organisms of questionable pathogenicity which indicates  contamination with commensal sacha.  Further identification is unlikely to provide clinically useful information.  Suggest recollection.    Alpha - 1 - Antitrypsin [[id number]]  (Abnormal) Collected:  08/02/17 2004    Specimen:  Blood Updated:  08/04/17 1114     A-1 Antitrypsin 275 (H) mg/dL     Narrative:       Performed at:  [address] [place] [address]  [id number]  : Adria Howe PhD, Phone:  [phone]    Antinuclear Antibodies Direct [[id number]] Collected:  08/02/17 2004    Specimen:  Blood Updated:  08/04/17 1114     RANDY Direct Negative    Narrative:       Performed at:  [address] [place] [address]  [id number]  : Adria Howe PhD, Phone:  [phone]    Scan Slide [[id number]] Collected:  08/04/17 1146    Specimen:  Blood Updated:  08/04/17 1154    CBC & Differential [[id number]] Collected:  08/04/17 1146    Specimen:  Blood Updated:  08/04/17 1159    Narrative:       The following orders were created for panel order CBC & Differential.  Procedure                               Abnormality         Status                     ---------                               -----------         ------                     Scan Slide[[id number]]                                       In process                 CBC Auto Differential[[id number]]        Abnormal            Final result                 Please view results for these tests on the individual orders.    CBC Auto Differential [[id number]]  (Abnormal) Collected:  08/04/17 1146    Specimen:  Blood Updated:  08/04/17 1159     WBC 5.67 10*3/mm3      RBC 3.45 (L) 10*6/mm3      Hemoglobin 10.3 (L) g/dL      Hematocrit 29.8 (L) %      MCV 86.4 fL      MCH 29.9 pg      MCHC 34.6 g/dL      RDW 14.9 (H) %      RDW-SD 46.7 (H) fl      MPV 10.5 fL      Platelets 48 (L) 10*3/mm3      Neutrophil % 92.5 (H) %      Lymphocyte % 5.1 (L) %      Monocyte % 1.1 %      Eosinophil % 0.2 %      Basophil % 0.2 %       Immature Grans % 0.9 (H) %      Neutrophils, Absolute 5.25 10*3/mm3      Lymphocytes, Absolute 0.29 (L) 10*3/mm3      Monocytes, Absolute 0.06 10*3/mm3      Eosinophils, Absolute 0.01 10*3/mm3      Basophils, Absolute 0.01 10*3/mm3      Immature Grans, Absolute 0.05 (H) 10*3/mm3     Anti-Smooth Muscle Antibody Titer [[id number]] Collected:  08/02/17 2004    Specimen:  Blood Updated:  08/04/17 1212     Smooth Muscle Ab 13 Units                        Negative                     0 - 19                   Weak positive               20 - 30                   Moderate to strong positive     >30   Actin Antibodies are found in 52-85% of patients with   autoimmune hepatitis or chronic active hepatitis and   in 22% of patients with primary biliary cirrhosis.       Narrative:       Performed at:  [address] [place] [address]  [id number]  : Adria Howe PhD, Phone:  [phone]    Ceruloplasmin [[id number]]  (Abnormal) Collected:  08/02/17 2004    Specimen:  Blood Updated:  08/04/17 1212     Ceruloplasmin 13.0 (L) mg/dL     Narrative:       Performed at:  [address] [place] [address]  [id number]  : Adria Howe PhD, Phone:  [phone]    AFP Tumor Marker [[id number]]  (Abnormal) Collected:  08/02/17 2004    Specimen:  Blood Updated:  08/04/17 1212     AFP Tumor Marker 8.8 (H) ng/mL       Roche ECLIA methodology       Narrative:       Performed at:  [address] [place] [address]  [id number]  : Adria Howe PhD, Phone:  [phone]          Culture Data:   No results found for: BLOODCX  Urine Culture   Date Value Ref Range Status   08/02/2017 Mixed Culture  Final     No results found for: RESPCX  No results found for: WOUNDCX  No results found for: STOOLCX  No components found for: BODYFLD    Radiology Data:   Imaging Results (last 24 hours)     Procedure Component Value Units Date/Time    US Abdominal Vascular Limited  [[id number]] Collected:  08/03/17 1508     Updated:  08/04/17 0809    Narrative:       DATE OF EXAM: 8/3/2017 3:07 PM CDT    PROCEDURE: US ABDOMEN    INDICATION FOR PROCEDURE: 37 years old patient presents for  evaluation of alcoholic cirrhosis with ascites.  ICD 10 codes:   K70.31.    TECHNIQUE: Multiple static grayscale images are obtained  transabdominally.    COMPARISON:  No comparable studies are available for comparison  at the time of dictation.    FINDINGS: Images of the liver reveal heterogeneous echogenicity  consistent with history of cirrhosis..  There are no dilated  intrahepatic biliary ducts. Color Doppler documents hepatopedal  blood flow within the portal vein. Peak systolic velocity within  the main portal vein is 27.2 cm/s.    The gallbladder is distended..  No gallstones are visible.  There  is no pericholecystic fluid or gallbladder wall thickening.   Common bile duct measures 3.7 mm  in greatest transverse  dimension.    The right kidney has a normal sonographic appearance without  evidence for perinephric fluid or hydronephrosis. The right  kidney measures 5.5 x 6.0 cm in transverse dimension. Right  kidney was not identified in its entirety..    The pancreas is obscured by bowel gas..    The left kidney has a grossly normal appearance without evidence  for perinephric fluid, mass or hydronephrosis. Left kidney  measures  10.1 x 5.3 x 5.2 cm.    The spleen has a grossly normal appearance..    Abdominal aorta is partially visualized and has a normal tapered  appearance..    Inferior vena cava is obscured..    Moderate amount of ascites is visualized.      Impression:       CONCLUSION:      1.  Liver is echogenic suggesting cirrhosis.   2.  Large amount of ascites.   3.  Nonvisualization of pancreas and incomplete visualization of  the right kidney.   4.  Patent portal vein.    Electronically signed by:  Beth Polanco MD  8/3/2017 4:26 PM CDT  Workstation: [email or web address]    US Abdomen Complete  [[id number]] Collected:  08/03/17 1507     Updated:  08/04/17 0809    Narrative:       DATE OF EXAM: 8/3/2017 3:07 PM CDT    PROCEDURE: US ABDOMEN    INDICATION FOR PROCEDURE: 37 years old patient presents for  evaluation of alcoholic cirrhosis with ascites.  ICD 10 codes:   K70.31.    TECHNIQUE: Multiple static grayscale images are obtained  transabdominally.    COMPARISON:  No comparable studies are available for comparison  at the time of dictation.    FINDINGS: Images of the liver reveal heterogeneous echogenicity  consistent with history of cirrhosis..  There are no dilated  intrahepatic biliary ducts. Color Doppler documents hepatopedal  blood flow within the portal vein. Peak systolic velocity within  the main portal vein is 27.2 cm/s.    The gallbladder is distended..  No gallstones are visible.  There  is no pericholecystic fluid or gallbladder wall thickening.   Common bile duct measures 3.7 mm  in greatest transverse  dimension.    The right kidney has a normal sonographic appearance without  evidence for perinephric fluid or hydronephrosis. The right  kidney measures 5.5 x 6.0 cm in transverse dimension. Right  kidney was not identified in its entirety..    The pancreas is obscured by bowel gas..    The left kidney has a grossly normal appearance without evidence  for perinephric fluid, mass or hydronephrosis. Left kidney  measures  10.1 x 5.3 x 5.2 cm.    The spleen has a grossly normal appearance..    Abdominal aorta is partially visualized and has a normal tapered  appearance..    Inferior vena cava is obscured..    Moderate amount of ascites is visualized.      Impression:       CONCLUSION:      1.  Liver is echogenic suggesting cirrhosis.   2.  Large amount of ascites.   3.  Nonvisualization of pancreas and incomplete visualization of  the right kidney.   4.  Patent portal vein.    Electronically signed by:  Beth Polanco MD  8/3/2017 4:26 PM CDT  Workstation: [email or web address]          ERNIE have reviewed the  patient current medications.     Assessment/Plan   Principal Problem:    Alcoholic cirrhosis of liver with ascites  Active Problems:    Chronic midline low back pain    Chronic pelvic pain in female    Ascites due to alcoholic cirrhosis    Moderate episode of recurrent major depressive disorder  Anemia of chronic disease   Hypokaliemia   Thrombocytopenia due to liver disease    Sinus tachycardia   Hyponatremia       -We'll continue with IV hydration.  -We'll get ultrasound-guided paracentesis done this morning.got drained .  -We'll get nephrology evaluation for hyponatremia. Is  on hypertonic saline fluid restiction and  Lasix . sodium is better today   Started on sandostatin and midodrine   Will transfuse 2 units of PRBC .will monitor H&H  Hb is stable today     Will add ciwa protocol to see if  It helps with tachycardia   -DVT and GI prophylaxis in place.  -We'll resume patient's home medication as prior to coming to the hospital.  -We'll continue monitoring patient hospital setting and treat patient as course dictates  Poor pg overall   Lobo Gu MD   08/04/17   12:20 PM

## 2017-08-04 NOTE — PROGRESS NOTES
Tam You DO,[facility]  Gastroenterology  Hepatology  Endoscopy  Board Certified in Internal Medicine and gastroenterology  [address], [address]  [place]. [address]  - [phone]   F - [phone]     GASTROENTEROLOGY PROGRESS NOTE   TAM YOU DO.         SUBJECTIVE:   8/3/2017  Chief Complaint:     Subjective  : Less problems with abdominal pain.  The patient only notices the pain whenever she moves over where she has had the paracentesis.  No evidence of any active ongoing gastrointestinal bleeding.  However, the patient has had a decline in the hemoglobin requiring blood transfusion.  The hemoglobin was 6.9 yesterday and is 5.9 today.  After blood transfusion the patient's hemoglobin went to 9.3.  Still significant thrombocytopenia secondary to the patient's advanced cirrhosis of the liver.    No evidence of any delirium tremors.  No evidence of any complications related to the paracentesis.  No evidence to suggest spontaneous bacterial peritonitis.  Nothing to suggest variceal bleeding.  Her graft the patient has been started on octreotide and Dr. dose on he changed it to intravenous octreotide since she would not allow for subcutaneous usage.         CURRENT MEDICATIONS/OBJECTIVE/VS/PE:     Current Medications:     Current Facility-Administered Medications   Medication Dose Route Frequency Provider Last Rate Last Dose   • acetaminophen (TYLENOL) tablet 650 mg  650 mg Oral Q4H PRN Mo Garcia MD       • bisacodyl (DULCOLAX) suppository 10 mg  10 mg Rectal Daily PRN Mo Garcia MD       • cyclobenzaprine (FLEXERIL) tablet 10 mg  10 mg Oral BID Mo Garcia MD   10 mg at 08/03/17 1805   • dextrose (D50W) solution 50 mL  50 mL Intravenous Q1H PRN Diogo Dickerson MD   50 mL at 08/02/17 0034   • docusate sodium (COLACE) capsule 100 mg  100 mg Oral BID Mo Garcia MD       • famotidine (PEPCID) tablet 20 mg  20 mg Oral BID Mo Astorga  MD Jose   20 mg at 08/03/17 1803   • furosemide (LASIX) injection 20 mg  20 mg Intravenous TID Roc Barnes MD   20 mg at 08/03/17 2155   • heparin (porcine) 5000 UNIT/ML injection 5,000 Units  5,000 Units Subcutaneous Q12H Mo Garcia MD   5,000 Units at 08/02/17 0957   • hydrALAZINE (APRESOLINE) injection 10 mg  10 mg Intravenous Q6H PRN Mo Garcia MD       • LORazepam (ATIVAN) injection 1 mg  1 mg Intravenous Q6H PRN Mo Garcia MD       • magnesium hydroxide (MILK OF MAGNESIA) suspension 2400 mg/10mL 10 mL  10 mL Oral Daily PRN Mo Garcia MD       • midodrine (PROAMATINE) tablet 2.5 mg  2.5 mg Oral TID AC Chandrakant You DO   2.5 mg at 08/03/17 1804   • Morphine sulfate (PF) injection 1 mg  1 mg Intravenous Q4H PRN Mo Garcia MD   1 mg at 08/03/17 1841   • nicotine (NICODERM CQ) 21 MG/24HR patch 1 patch  1 patch Transdermal Q24H Mo Garcia MD       • octreotide (sandoSTATIN) injection 50 mcg  50 mcg Intravenous TID Roc Barnes MD   50 mcg at 08/03/17 2155   • ondansetron (ZOFRAN) tablet 4 mg  4 mg Oral Q6H PRN Mo Garcia MD        Or   • ondansetron ODT (ZOFRAN-ODT) disintegrating tablet 4 mg  4 mg Oral Q6H PRN Mo Garcia MD        Or   • ondansetron (ZOFRAN) injection 4 mg  4 mg Intravenous Q6H PRN Mo Garcia MD       • pantoprazole (PROTONIX) EC tablet 40 mg  40 mg Oral QAM Mo Garcia MD   40 mg at 08/03/17 0821   • potassium chloride (MICRO-K) CR capsule 40 mEq  40 mEq Oral PRN Mo Garcia MD   40 mEq at 08/03/17 0607    Or   • potassium chloride (KLOR-CON) packet 40 mEq  40 mEq Oral PRN Mo Garcia MD        Or   • potassium chloride 10 mEq in 100 mL IVPB  10 mEq Intravenous Q1H PRN Mo Garcia  mL/hr at 08/03/17 1445 10 mEq at 08/03/17 1445   • potassium chloride 10 mEq in 100 mL IVPB  10 mEq Intravenous Q1H PRN Lobo Gu MD        • sodium chloride (HYPERTONIC) 3 % infusion  25 mL/hr Intravenous Continuous Roc Barnes MD 25 mL/hr at 08/03/17 1815 25 mL/hr at 08/03/17 1815   • sodium chloride 0.9 % flush 1-10 mL  1-10 mL Intravenous PRN Mo Garcia MD   10 mL at 08/03/17 1104   • sodium chloride 0.9 % flush 10 mL  10 mL Intravenous PRN Diogo Dickerson MD       • venlafaxine (EFFEXOR) tablet 75 mg  75 mg Oral QAM Mo Garcia MD   75 mg at 08/03/17 0815   • zolpidem (AMBIEN) tablet 5 mg  5 mg Oral Nightly PRN Roc Barnes MD   5 mg at 08/03/17 1922       Objective     Physical Exam:   Temp:  [97.7 °F (36.5 °C)-98.7 °F (37.1 °C)] 98.5 °F (36.9 °C)  Heart Rate:  [109-140] 127  Resp:  [16-18] 16  BP: ()/(57-84) 102/68     Physical Exam:  General Appearance:  Chronically ill-appearing   Head:    Normocephalic, without obvious abnormality, atraumatic   Eyes:            Lids and lashes normal, conjunctivae and sclerae normal, no   icterus, no pallor, corneas clear, PERRLA   Ears:    Ears appear intact with no abnormalities noted   Throat:   No oral lesions, no thrush, oral mucosa moist   Neck:   No adenopathy, supple, trachea midline, no thyromegaly, no     carotid bruit, no JVD   Back:     No kyphosis present, no scoliosis present, no skin lesions,       erythema or scars, no tenderness to percussion or                   palpation,   range of motion normal   Lungs:     Clear to auscultation,respirations regular, even and                   unlabored    Heart:    Regular rhythm and normal rate, normal S1 and S2, no            murmur, no gallop, no rub, no click   Breast Exam:    Deferred   Abdomen:     Normal bowel sounds, no masses, no organomegaly, soft        non-tender, non-distended, no guarding, no rebound                 tenderness-ascites, mild    Genitalia:    Deferred   Extremities:   Moves all extremities well, no edema, no cyanosis, no              redness   Pulses:   Pulses palpable and equal bilaterally    Skin:   No bleeding, bruising or rash   Lymph nodes:   No palpable adenopathy   Neurologic:   Cranial nerves 2 - 12 grossly intact, sensation intact, DTR        present and equal bilaterally      Results Review:     Lab Results (last 24 hours)     Procedure Component Value Units Date/Time    Extra Tubes [[id number]] Collected:  08/02/17 2004    Specimen:  Blood from Blood, Venous Line Updated:  08/02/17 2201    Narrative:       The following orders were created for panel order Extra Tubes.  Procedure                               Abnormality         Status                     ---------                               -----------         ------                     Lavender Top[[id number]]                                     Final result               Gold Top - SST[[id number]]                                   Final result                 Please view results for these tests on the individual orders.    Lavender Top [[id number]] Collected:  08/02/17 2004    Specimen:  Blood Updated:  08/02/17 2201     Extra Tube hold for add-on      Auto resulted       Gold Top - SST [[id number]] Collected:  08/02/17 2004    Specimen:  Blood Updated:  08/02/17 2201     Extra Tube Hold for add-ons.      Auto resulted.       CBC Auto Differential [[id number]]  (Abnormal) Collected:  08/03/17 0401    Specimen:  Blood Updated:  08/03/17 0446     WBC 5.18 10*3/mm3      RBC 2.24 (L) 10*6/mm3      Hemoglobin 6.8 (L) g/dL      Hematocrit 19.8 (L) %      MCV 88.4 fL      MCH 30.4 pg      MCHC 34.3 g/dL      RDW 15.3 (H) %      RDW-SD 49.6 (H) fl      MPV 10.9 fL      Platelets 57 (L) 10*3/mm3      Neutrophil % 93.1 (H) %      Lymphocyte % 4.4 (L) %      Monocyte % 1.0 %      Eosinophil % 0.0 %      Basophil % 0.0 %      Immature Grans % 1.5 (H) %      Neutrophils, Absolute 4.82 10*3/mm3      Lymphocytes, Absolute 0.23 (L) 10*3/mm3      Monocytes, Absolute 0.05 10*3/mm3      Eosinophils, Absolute 0.00 10*3/mm3      Basophils, Absolute 0.00 10*3/mm3        Immature Grans, Absolute 0.08 (H) 10*3/mm3     CBC & Differential [[id number]] Collected:  08/03/17 0401    Specimen:  Blood Updated:  08/03/17 0446    Narrative:       The following orders were created for panel order CBC & Differential.  Procedure                               Abnormality         Status                     ---------                               -----------         ------                     Scan Slide[[id number]]                                                                  CBC Auto Differential[[id number]]        Abnormal            Final result                 Please view results for these tests on the individual orders.    Magnesium [[id number]]  (Normal) Collected:  08/03/17 0401    Specimen:  Blood Updated:  08/03/17 0453     Magnesium 1.8 mg/dL     Bilirubin, Direct [[id number]]  (Normal) Collected:  08/03/17 0401    Specimen:  Blood Updated:  08/03/17 0453     Bilirubin, Direct 0.0 mg/dL     Iron Profile [[id number]]  (Abnormal) Collected:  08/03/17 0401    Specimen:  Blood Updated:  08/03/17 0455     Iron <10 (L) mcg/dL      TIBC 141 (L) mcg/dL      Iron Saturation -- %       Unable to calculate.       Comprehensive Metabolic Panel [[id number]]  (Abnormal) Collected:  08/03/17 0401    Specimen:  Blood Updated:  08/03/17 0457     Glucose 70 mg/dL      BUN 14 mg/dL      Creatinine 0.53 mg/dL      Sodium 125 (L) mmol/L      Potassium 3.1 (L) mmol/L      Chloride 88 (L) mmol/L      CO2 30.0 mmol/L      Calcium 6.6 (L) mg/dL      Total Protein 4.2 (L) g/dL      Albumin 2.00 (L) g/dL      ALT (SGPT) 68 (H) U/L      AST (SGOT) 93 (H) U/L      Alkaline Phosphatase 77 U/L      Total Bilirubin 1.1 mg/dL      eGFR  African Amer 156 (H) mL/min/1.73      Globulin 2.2 (L) gm/dL      A/G Ratio 0.9 (L) g/dL      BUN/Creatinine Ratio 26.4 (H)     Anion Gap 7.0 mmol/L     Urine Culture [[id number]]  (Normal) Collected:  08/02/17 7274    Specimen:  Urine from Urine, Clean Catch Updated:  08/03/17 8388      Urine Culture Culture in progress    CBC & Differential [[id number]] Collected:  08/03/17 0804    Specimen:  Blood Updated:  08/03/17 0823    Narrative:       The following orders were created for panel order CBC & Differential.  Procedure                               Abnormality         Status                     ---------                               -----------         ------                     Scan Slide[[id number]]                                                                  CBC Auto Differential[[id number]]        Abnormal            Final result                 Please view results for these tests on the individual orders.    CBC Auto Differential [[id number]]  (Abnormal) Collected:  08/03/17 0804    Specimen:  Blood Updated:  08/03/17 0823     WBC 4.60 10*3/mm3      RBC 2.30 (L) 10*6/mm3      Hemoglobin 6.9 (L) g/dL       RESULTS CALLED TO SKYLA WELCH/SHILA        Hematocrit 20.2 (L) %      MCV 87.8 fL      MCH 30.0 pg      MCHC 34.2 g/dL      RDW 15.4 (H) %      RDW-SD 49.1 (H) fl      MPV 10.8 fL      Platelets 55 (L) 10*3/mm3      Neutrophil % 90.6 (H) %      Lymphocyte % 5.9 (L) %      Monocyte % 1.1 %      Eosinophil % 0.2 %      Basophil % 0.2 %      Immature Grans % 2.0 (H) %      Neutrophils, Absolute 4.17 10*3/mm3      Lymphocytes, Absolute 0.27 (L) 10*3/mm3      Monocytes, Absolute 0.05 10*3/mm3      Eosinophils, Absolute 0.01 10*3/mm3      Basophils, Absolute 0.01 10*3/mm3      Immature Grans, Absolute 0.09 (H) 10*3/mm3     Urinalysis With / Microscopic If Indicated [[id number]]  (Abnormal) Collected:  08/03/17 0831    Specimen:  Urine from Urine, Clean Catch Updated:  08/03/17 0841     Color, UA Yellow     Appearance, UA Clear     pH, UA 6.5     Specific Gravity, UA 1.012     Glucose, UA Negative     Ketones, UA 15 mg/dL (1+) (A)     Bilirubin, UA Small (1+) (A)     Blood, UA Negative     Protein, UA Negative     Leuk Esterase, UA Trace (A)     Nitrite, UA Negative     Urobilinogen, UA 2.0 E.U./dL  (A)    Urinalysis, Microscopic Only [[id number]]  (Abnormal) Collected:  08/03/17 0831    Specimen:  Urine from Urine, Clean Catch Updated:  08/03/17 0841     RBC, UA 0-2 (A) /HPF      WBC, UA 3-5 /HPF      Bacteria, UA None Seen /HPF      Squamous Epithelial Cells, UA None Seen /HPF      Hyaline Casts, UA 0-2 /LPF      Methodology Automated Microscopy    Extra Tubes [[id number]] Collected:  08/03/17 0804    Specimen:  Blood from Blood, Venous Line Updated:  08/03/17 1001    Narrative:       The following orders were created for panel order Extra Tubes.  Procedure                               Abnormality         Status                     ---------                               -----------         ------                     Green Top (Gel)[[id number]]                                  Final result                 Please view results for these tests on the individual orders.    Green Top (Gel) [[id number]] Collected:  08/03/17 0804    Specimen:  Blood Updated:  08/03/17 1001     Extra Tube Hold for add-ons.      Auto resulted.       Sodium [[id number]]  (Abnormal) Collected:  08/03/17 1153    Specimen:  Blood Updated:  08/03/17 1209     Sodium 125 (L) mmol/L     CBC Auto Differential [[id number]]  (Abnormal) Collected:  08/03/17 1153    Specimen:  Blood Updated:  08/03/17 1212     WBC 3.82 10*3/mm3      RBC 2.01 (L) 10*6/mm3      Hemoglobin 5.9 (C) g/dL      Hematocrit 17.7 (L) %      MCV 88.1 fL      MCH 29.4 pg      MCHC 33.3 g/dL      RDW 15.4 (H) %      RDW-SD 49.7 (H) fl      MPV 10.6 fL      Platelets 50 (L) 10*3/mm3      Neutrophil % 92.2 (H) %      Lymphocyte % 5.5 (L) %      Monocyte % 0.5 %      Eosinophil % 0.0 %      Basophil % 0.0 %      Immature Grans % 1.8 (H) %      Neutrophils, Absolute 3.52 10*3/mm3      Lymphocytes, Absolute 0.21 (L) 10*3/mm3      Monocytes, Absolute 0.02 10*3/mm3      Eosinophils, Absolute 0.00 10*3/mm3      Basophils, Absolute 0.00 10*3/mm3      Immature Grans, Absolute 0.07 (H)  10*3/mm3      nRBC 0.0 /100 WBC     CBC & Differential [[id number]] Collected:  08/03/17 1153    Specimen:  Blood Updated:  08/03/17 1307    Narrative:       The following orders were created for panel order CBC & Differential.  Procedure                               Abnormality         Status                     ---------                               -----------         ------                     Manual Differential[[id number]]                                                         Scan Slide[[id number]]                                       Final result               CBC Auto Differential[[id number]]        Abnormal            Final result                 Please view results for these tests on the individual orders.    Scan Slide [[id number]] Collected:  08/03/17 1153    Specimen:  Blood Updated:  08/03/17 1307     Anisocytosis Slight/1+     Hypochromia Slight/1+      Few target cells observed        WBC Morphology Normal     Platelet Estimate Decreased    Potassium [[id number]]  (Normal) Collected:  08/03/17 1855    Specimen:  Blood from Blood, Venous Line Updated:  08/03/17 1921     Potassium 3.7 mmol/L     Sodium [[id number]]  (Abnormal) Collected:  08/03/17 1855    Specimen:  Blood from Blood, Venous Line Updated:  08/03/17 1921     Sodium 126 (L) mmol/L     CBC (No Diff) [[id number]]  (Abnormal) Collected:  08/03/17 1855    Specimen:  Blood from Blood, Venous Line Updated:  08/03/17 1933     WBC 4.69 10*3/mm3      RBC 3.08 (L) 10*6/mm3      Hemoglobin 9.3 (L) g/dL       PT RECEIVED 2 UNITS RBCS TODAY        Hematocrit 27.2 (L) %      MCV 88.3 fL      MCH 30.2 pg      MCHC 34.2 g/dL      RDW 14.7 (H) %      RDW-SD 47.1 (H) fl      MPV 10.5 fL      Platelets 45 (L) 10*3/mm3       PREVIOUSLY CALLED RESULTS. SLIDE SCAN WITHIN THE LAST 3 DAYS              I reviewed the patient's new clinical results.  I reviewed the patient's new imaging results and agree with the interpretation.     ASSESSMENT/PLAN:   ASSESSMENT:      1.  Cirrhosis secondary to alcohol  2.  Ascites secondary to cirrhosis.  No evidence of any portal vein thrombosis  3.  Anemia secondary to chronic disease, alcohol-induced bone marrow suppression as well as blood loss  4.  Unexplained tachycardia   PLAN:   1.  Observation for down  2.  Continue the octreotide for at least 72 hours as well as continue the Midodrin for 72 hours  3.  Poor prognosis  4.  If evidence of active gastrointestinal bleeding, would do esophagogastroduodenoscopy  5.  At this time, no need for transplantation consultation     Chandrakant You DO  08/03/17  9:56 PM

## 2017-08-05 NOTE — PLAN OF CARE
"Problem: Patient Care Overview (Adult)  Goal: Plan of Care Review  Outcome: Ongoing (interventions implemented as appropriate)    08/05/17 1045   Coping/Psychosocial Response Interventions   Plan Of Care Reviewed With patient   Patient Care Overview   Progress no change   Outcome Evaluation   Outcome Summary/Follow up Plan pt. very weak and \"not feeling well\" today; poor appetite; abd pain poorly controlled with meds; pt. hypotensive this afternoon after IV Lasix; UOP good; abd tender and distended       Goal: Adult Individualization and Mutuality  Outcome: Ongoing (interventions implemented as appropriate)  Goal: Discharge Needs Assessment  Outcome: Ongoing (interventions implemented as appropriate)    Problem: Liver Failure, Acute/Chronic (Adult)  Goal: Signs and Symptoms of Listed Potential Problems Will be Absent or Manageable (Liver Failure, Acute/Chronic)  Outcome: Ongoing (interventions implemented as appropriate)    Problem: Pressure Ulcer Risk (Sean Scale) (Adult,Obstetrics,Pediatric)  Goal: Identify Related Risk Factors and Signs and Symptoms  Outcome: Ongoing (interventions implemented as appropriate)  Goal: Skin Integrity  Outcome: Ongoing (interventions implemented as appropriate)    Problem: Pain, Acute (Adult)  Goal: Identify Related Risk Factors and Signs and Symptoms  Outcome: Ongoing (interventions implemented as appropriate)  Goal: Acceptable Pain Control/Comfort Level  Outcome: Ongoing (interventions implemented as appropriate)      "

## 2017-08-05 NOTE — PLAN OF CARE
Problem: Patient Care Overview (Adult)  Goal: Plan of Care Review  Outcome: Ongoing (interventions implemented as appropriate)  Goal: Adult Individualization and Mutuality  Outcome: Ongoing (interventions implemented as appropriate)  Goal: Discharge Needs Assessment  Outcome: Ongoing (interventions implemented as appropriate)    Problem: Liver Failure, Acute/Chronic (Adult)  Goal: Signs and Symptoms of Listed Potential Problems Will be Absent or Manageable (Liver Failure, Acute/Chronic)  Outcome: Ongoing (interventions implemented as appropriate)    Problem: Respiratory Insufficiency (Adult)  Goal: Acid/Base Balance  Outcome: Ongoing (interventions implemented as appropriate)  Goal: Effective Ventilation  Outcome: Ongoing (interventions implemented as appropriate)

## 2017-08-05 NOTE — PROGRESS NOTES
"[facility]  [address] [place] [address]. [address]  T - [phone]  F - [phone]     Progress Note          PATIENT  DEMOGRAPHICS   PATIENT NAME: Joseline Parnell                      PHYSICIAN: Roc Barnes MD  : 1980  MRN: [id number]   LOS: 3 days    Patient Care Team:  ERASMO Alvarez as PCP - General (Family Medicine)  Subjective   SUBJECTIVE   Back pain no soa         Objective   OBJECTIVE   Vital Signs  Temp:  [98.8 °F (37.1 °C)-98.9 °F (37.2 °C)] 98.8 °F (37.1 °C)  Heart Rate:  [123-139] 133  BP: ()/(60-78) 108/76    Flowsheet Rows         First Filed Value    Admission Height  65\" (165.1 cm) Documented at 2017    Admission Weight  120 lb (54.4 kg) Documented at 2017           I/O last 3 completed shifts:  In: 1229.3 [P.O.:325; I.V.:904.3]  Out: 2600 [Urine:2600]    PHYSICAL EXAM    Physical Exam   Constitutional: She is oriented to person, place, and time. She appears well-developed.   HENT:   Head: Normocephalic.   Eyes: Pupils are equal, round, and reactive to light.   Cardiovascular: Normal rate, regular rhythm and normal heart sounds.    Pulmonary/Chest: Effort normal and breath sounds normal.   Abdominal: Soft. Bowel sounds are normal. She exhibits distension.   Neurological: She is alert and oriented to person, place, and time.       RESULTS   Results Review:      Results from last 7 days  Lab Units 17  0641 17  0518 17  1855  17  0401   SODIUM mmol/L 129* 127* 126*  < > 125*   POTASSIUM mmol/L 3.2* 3.0* 3.7  --  3.1*   CHLORIDE mmol/L 94* 92*  --   --  88*   CO2 mmol/L 30.0 30.0  --   --  30.0   BUN mg/dL 11 10  --   --  14   CREATININE mg/dL 0.43* 0.44*  --   --  0.53   CALCIUM mg/dL 6.7* 7.0*  --   --  6.6*   BILIRUBIN mg/dL 1.6* 1.4*  --   --  1.1   ALK PHOS U/L 81 78  --   --  77   ALT (SGPT) U/L 63* 63*  --   --  68*   AST (SGOT) U/L 59* 72*  --   --  93*   GLUCOSE mg/dL 66 73  --   --  70   < > = " values in this interval not displayed.    Estimated Creatinine Clearance: 133.3 mL/min (by C-G formula based on Cr of 0.43).      Results from last 7 days  Lab Units 08/05/17  0641 08/04/17  0518 08/03/17  0401   MAGNESIUM mg/dL 1.4* 1.4* 1.8         Results from last 7 days  Lab Units 08/02/17  1115   URIC ACID mg/dL 6.8         Results from last 7 days  Lab Units 08/05/17  0641 08/05/17  0018 08/04/17  1801 08/04/17  1146 08/04/17  0518   WBC 10*3/mm3 6.11 9.52 6.85 5.67 5.16   HEMOGLOBIN g/dL 9.5* 9.6* 10.2* 10.3* 9.6*   PLATELETS 10*3/mm3 34* 35* 46* 48* 40*         Results from last 7 days  Lab Units 08/01/17  2336   INR  0.92         Imaging Results (last 24 hours)     ** No results found for the last 24 hours. **           MEDICATIONS      cyclobenzaprine 10 mg Oral BID   docusate sodium 100 mg Oral BID   famotidine 20 mg Oral BID   furosemide 20 mg Intravenous TID   midodrine 2.5 mg Oral TID AC   nicotine 1 patch Transdermal Q24H   octreotide 50 mcg Intravenous TID   pantoprazole 40 mg Oral QAM   propranolol 20 mg Oral Q8H   venlafaxine 37.5 mg Oral QAM       sodium chloride 30 mL/hr Last Rate: 30 mL/hr (08/05/17 0805)       Assessment/Plan   ASSESSMENT / PLAN    Principal Problem:    Alcoholic cirrhosis of liver with ascites  Active Problems:    Chronic midline low back pain    Chronic pelvic pain in female    Ascites due to alcoholic cirrhosis    Moderate episode of recurrent major depressive disorder    1.hyponatremia.  This is likely secondary to hypervolemic state in the setting of chronic liver disease.  Also she is on spironolactone which is recently started and that can drop the sodium.  She is also on mirtazapine and effexor which can cause SIADH.     Urine na is low suggesting heparo renal / volume depletion. Didn't respond with 0.9 and therefore 3% now. 3% now 30 cc/hr. Check na later.  tsh and uric acid normal . keep Lasix 20 mg IV twice a day. Tolvaptan is contraindicated because of liver  cirrhosis.      2.alcoholic liver cirrhosis with marked ascites recent paracentesis about a month ago.  Patient has now abdominal distention with left lower chest pain.  s/p paracentesis had 10 L removed. Keep sandostatin midodrine and albumin. Keep lasix. Will need aldactone once na improved     3.chronic back pain/depression add ultram po and iv morphine. Avoid nsaids due to risk of variceal bleeding    4. Anemia s/p  2 U PRBC    5. Hypokalemia replace today replace mg    6. Hypocalcemia with low albumin - vitamin d deficiency start replacement              This document has been electronically signed by Roc Barnes MD on August 5, 2017 10:46 AM

## 2017-08-05 NOTE — PROGRESS NOTES
[facility] Medicine Services  INPATIENT PROGRESS NOTE    Length of Stay: 3  Date of Admission: 8/1/2017  Primary Care Physician: ERASMO Alvarez    Subjective   Chief Complaint:   Chief Complaint   Patient presents with   • Abdominal Pain       HPI:  HPI      Review of Systems   Constitutional: Negative for activity change, appetite change, chills, diaphoresis, fatigue, fever and unexpected weight change.   HENT: Negative.  Negative for congestion, dental problem, drooling, ear discharge, ear pain, facial swelling, hearing loss, mouth sores, nosebleeds, postnasal drip, rhinorrhea, sinus pressure, sneezing, tinnitus, trouble swallowing and voice change.    Eyes: Negative for photophobia and discharge.   Respiratory: Negative for apnea, cough, choking, shortness of breath, wheezing and stridor.    Cardiovascular: Negative for chest pain, palpitations and leg swelling.   Gastrointestinal: Positive for abdominal distention. Negative for abdominal pain, anal bleeding, blood in stool, constipation, diarrhea, nausea, rectal pain and vomiting.   Endocrine: Negative for cold intolerance, heat intolerance, polydipsia, polyphagia and polyuria.   Genitourinary: Negative for dysuria, enuresis, frequency, hematuria and urgency.   Musculoskeletal: Positive for back pain. Negative for arthralgias, joint swelling, myalgias, neck pain and neck stiffness.   Skin: Negative for color change, pallor, rash and wound.   Allergic/Immunologic: Negative for food allergies and immunocompromised state.   Neurological: Positive for weakness. Negative for dizziness, tremors, seizures, syncope, facial asymmetry, speech difficulty, light-headedness, numbness and headaches.   Hematological: Negative for adenopathy.   Psychiatric/Behavioral: Negative for agitation, behavioral problems, confusion, hallucinations, sleep disturbance and suicidal ideas. The patient is not nervous/anxious.         All  pertinent negatives and positives are as above. All other systems have been reviewed and are negative unless otherwise stated.     Objective    Temp:  [97.2 °F (36.2 °C)-98.9 °F (37.2 °C)] 97.2 °F (36.2 °C)  Heart Rate:  [110-139] 110  Resp:  [16-20] 16  BP: ()/(60-82) 93/68  Physical Exam   Constitutional: She is oriented to person, place, and time.   Cachectic    Eyes: EOM are normal. Pupils are equal, round, and reactive to light.   Neck: Normal range of motion. Neck supple.   Cardiovascular: Normal rate, regular rhythm, normal heart sounds and intact distal pulses.  Exam reveals no gallop and no friction rub.    No murmur heard.  Pulmonary/Chest: Effort normal and breath sounds normal. No respiratory distress. She has no wheezes. She has no rales. She exhibits no tenderness.   Abdominal: Soft. Bowel sounds are normal. She exhibits distension.   Ascites    Musculoskeletal: She exhibits edema.   Lower ext edema bilat    Neurological: She is alert and oriented to person, place, and time.   Skin: Skin is warm and dry.   Psychiatric: She has a normal mood and affect. Her behavior is normal. Judgment and thought content normal.           Results Review:  I have reviewed the labs, radiology results, and diagnostic studies.    Laboratory Data:   Lab Results (last 24 hours)     Procedure Component Value Units Date/Time    Vitamin D 25 Hydroxy [[id number]]  (Abnormal) Collected:  08/04/17 0518    Specimen:  Blood Updated:  08/04/17 1659     25 Hydroxy, Vitamin D <12.8 (L) ng/ml     CBC Auto Differential [[id number]]  (Abnormal) Collected:  08/04/17 1801    Specimen:  Blood Updated:  08/04/17 1812     WBC 6.85 10*3/mm3      RBC 3.45 (L) 10*6/mm3      Hemoglobin 10.2 (L) g/dL      Hematocrit 30.0 (L) %      MCV 87.0 fL      MCH 29.6 pg      MCHC 34.0 g/dL      RDW 15.0 (H) %      RDW-SD 47.0 (H) fl      MPV 10.9 fL      Platelets 46 (L) 10*3/mm3     CBC & Differential [[id number]] Collected:  08/04/17 1801    Specimen:   Blood Updated:  08/04/17 1915    Narrative:       The following orders were created for panel order CBC & Differential.  Procedure                               Abnormality         Status                     ---------                               -----------         ------                     Manual Differential[[id number]]                                                         Scan Slide[[id number]]                                       Final result               CBC Auto Differential[[id number]]        Abnormal            Final result                 Please view results for these tests on the individual orders.    Scan Slide [[id number]] Collected:  08/04/17 1801    Specimen:  Blood Updated:  08/04/17 1915     Anisocytosis Slight/1+     Hypochromia Slight/1+     Target Cells Mod/2+     WBC Morphology Normal     Platelet Estimate Decreased     Clumped Platelets --      NONE SEEN       CBC Auto Differential [[id number]]  (Abnormal) Collected:  08/05/17 0018    Specimen:  Blood Updated:  08/05/17 0059     WBC 9.52 10*3/mm3      RBC 3.28 (L) 10*6/mm3      Hemoglobin 9.6 (L) g/dL      Hematocrit 28.7 (L) %      MCV 87.5 fL      MCH 29.3 pg      MCHC 33.4 g/dL      RDW 15.0 (H) %      RDW-SD 47.5 (H) fl      MPV 12.1 (H) fL      Platelets 35 (L) 10*3/mm3      Neutrophil % 87.9 (H) %      Lymphocyte % 5.6 (L) %      Monocyte % 4.6 %      Eosinophil % 0.5 %      Basophil % 0.3 %      Immature Grans % 1.1 (H) %      Neutrophils, Absolute 8.37 10*3/mm3      Lymphocytes, Absolute 0.53 (L) 10*3/mm3      Monocytes, Absolute 0.44 10*3/mm3      Eosinophils, Absolute 0.05 10*3/mm3      Basophils, Absolute 0.03 10*3/mm3      Immature Grans, Absolute 0.10 (H) 10*3/mm3      nRBC 0.0 /100 WBC     CBC & Differential [[id number]] Collected:  08/05/17 0018    Specimen:  Blood Updated:  08/05/17 0253    Narrative:       The following orders were created for panel order CBC & Differential.  Procedure                               Abnormality          Status                     ---------                               -----------         ------                     Scan Slide[[id number]]                                       Final result               CBC Auto Differential[[id number]]        Abnormal            Final result                 Please view results for these tests on the individual orders.    Scan Slide [[id number]] Collected:  08/05/17 0018    Specimen:  Blood Updated:  08/05/17 0253     Anisocytosis Slight/1+     Toxic Granulation Slight/1+     Vacuolated Neutrophils Slight/1+     Platelet Estimate Decreased    Scan Slide [[id number]] Collected:  08/05/17 0641    Specimen:  Blood Updated:  08/05/17 0648    CBC & Differential [[id number]] Collected:  08/05/17 0641    Specimen:  Blood Updated:  08/05/17 0653    Narrative:       The following orders were created for panel order CBC & Differential.  Procedure                               Abnormality         Status                     ---------                               -----------         ------                     Scan Slide[[id number]]                                       In process                 CBC Auto Differential[[id number]]        Abnormal            Final result                 Please view results for these tests on the individual orders.    CBC Auto Differential [[id number]]  (Abnormal) Collected:  08/05/17 0641    Specimen:  Blood Updated:  08/05/17 0653     WBC 6.11 10*3/mm3      RBC 3.18 (L) 10*6/mm3      Hemoglobin 9.5 (L) g/dL      Hematocrit 27.7 (L) %      MCV 87.1 fL      MCH 29.9 pg      MCHC 34.3 g/dL      RDW 14.9 (H) %      RDW-SD 46.8 (H) fl      MPV 11.7 fL      Platelets 34 (L) 10*3/mm3      Neutrophil % 87.4 (H) %      Lymphocyte % 6.4 (L) %      Monocyte % 5.1 %      Eosinophil % 0.2 %      Basophil % 0.2 %      Immature Grans % 0.7 (H) %      Neutrophils, Absolute 5.35 10*3/mm3      Lymphocytes, Absolute 0.39 (L) 10*3/mm3      Monocytes, Absolute 0.31 10*3/mm3       Eosinophils, Absolute 0.01 10*3/mm3      Basophils, Absolute 0.01 10*3/mm3      Immature Grans, Absolute 0.04 (H) 10*3/mm3     Comprehensive Metabolic Panel [[id number]]  (Abnormal) Collected:  08/05/17 0641    Specimen:  Blood Updated:  08/05/17 0726     Glucose 66 mg/dL      BUN 11 mg/dL      Creatinine 0.43 (L) mg/dL      Sodium 129 (L) mmol/L      Potassium 3.2 (L) mmol/L      Chloride 94 (L) mmol/L      CO2 30.0 mmol/L      Calcium 6.7 (L) mg/dL      Total Protein 4.2 (L) g/dL      Albumin 1.90 (L) g/dL      ALT (SGPT) 63 (H) U/L      AST (SGOT) 59 (H) U/L      Alkaline Phosphatase 81 U/L      Total Bilirubin 1.6 (H) mg/dL      eGFR   Amer 199 (H) mL/min/1.73      Globulin 2.3 gm/dL      A/G Ratio 0.8 (L) g/dL      BUN/Creatinine Ratio 25.6 (H)     Anion Gap 5.0 mmol/L     Magnesium [[id number]]  (Abnormal) Collected:  08/05/17 0641    Specimen:  Blood Updated:  08/05/17 0726     Magnesium 1.4 (L) mg/dL     Bilirubin, Direct [[id number]]  (Normal) Collected:  08/05/17 0641    Specimen:  Blood Updated:  08/05/17 0726     Bilirubin, Direct 0.3 mg/dL     CBC & Differential [[id number]] Collected:  08/05/17 1201    Specimen:  Blood Updated:  08/05/17 1223    Narrative:       The following orders were created for panel order CBC & Differential.  Procedure                               Abnormality         Status                     ---------                               -----------         ------                     CBC Auto Differential[[id number]]                            In process                   Please view results for these tests on the individual orders.    CBC Auto Differential [[id number]] Collected:  08/05/17 1201    Specimen:  Blood Updated:  08/05/17 1223    Extra Tubes [[id number]] Collected:  08/05/17 1201    Specimen:  Blood from Blood, Venous Line Updated:  08/05/17 1226    Narrative:       The following orders were created for panel order Extra Tubes.  Procedure                                Abnormality         Status                     ---------                               -----------         ------                     Green Top (Gel)[[id number]]                                  In process                   Please view results for these tests on the individual orders.    Green Top (Gel) [[id number]] Collected:  08/05/17 1201    Specimen:  Blood Updated:  08/05/17 1226          Culture Data:   No results found for: BLOODCX  Urine Culture   Date Value Ref Range Status   08/02/2017 Mixed Culture  Final     No results found for: RESPCX  No results found for: WOUNDCX  No results found for: STOOLCX  No components found for: BODYFLD    Radiology Data:   Imaging Results (last 24 hours)     ** No results found for the last 24 hours. **          I have reviewed the patient current medications.     Assessment/Plan     Principal Problem:    Alcoholic cirrhosis of liver with ascites  Active Problems:    Chronic midline low back pain    Chronic pelvic pain in female    Ascites due to alcoholic cirrhosis    Moderate episode of recurrent major depressive disorder  Anemia of chronic disease   Hypokaliemia   Thrombocytopenia due to liver disease    Sinus tachycardia   Hyponatremia       -We'll continue with IV hydration.  -We'll get ultrasound-guided paracentesis done this morning.got drained .10 liters drained   -We'll get nephrology evaluation for hyponatremia. Is  on hypertonic saline fluid restiction and  Lasix . sodium is better today   Started on sandostatin and midodrine   Will transfuse 2 units of PRBC .will monitor H&H  Hb is stable today   08/05 hb still stable      Will add ciwa protocol to see if  It helps with tachycardia   08/05 will add propranolol  Order cta rule out PE   -DVT and GI prophylaxis in place.  -We'll resume patient's home medication as prior to coming to the hospital.  -We'll continue monitoring patient hospital setting and treat patient as course dictates  Poor pg overall     Lobo JAMES  MD Riccardo   08/05/17   12:44 PM

## 2017-08-06 NOTE — PLAN OF CARE
Problem: Patient Care Overview (Adult)  Goal: Plan of Care Review  Outcome: Ongoing (interventions implemented as appropriate)    08/06/17 0306   Coping/Psychosocial Response Interventions   Plan Of Care Reviewed With patient   Patient Care Overview   Progress no change   Outcome Evaluation   Outcome Summary/Follow up Plan Pt has rested most of this shift. Requested ambien for sleep. B/P has remained low but maintained a good MAP. Urine output good this shift. Abdomen remains painful and distended this shift.       Goal: Adult Individualization and Mutuality  Outcome: Ongoing (interventions implemented as appropriate)  Goal: Discharge Needs Assessment  Outcome: Ongoing (interventions implemented as appropriate)    Problem: Liver Failure, Acute/Chronic (Adult)  Goal: Signs and Symptoms of Listed Potential Problems Will be Absent or Manageable (Liver Failure, Acute/Chronic)  Outcome: Ongoing (interventions implemented as appropriate)    Problem: Pressure Ulcer Risk (Sean Scale) (Adult,Obstetrics,Pediatric)  Goal: Identify Related Risk Factors and Signs and Symptoms  Outcome: Ongoing (interventions implemented as appropriate)  Goal: Skin Integrity  Outcome: Ongoing (interventions implemented as appropriate)    Problem: Pain, Acute (Adult)  Goal: Identify Related Risk Factors and Signs and Symptoms  Outcome: Ongoing (interventions implemented as appropriate)  Goal: Acceptable Pain Control/Comfort Level  Outcome: Ongoing (interventions implemented as appropriate)

## 2017-08-06 NOTE — PROGRESS NOTES
Patient has a 30 % pneumothorax on same side of central line      Traumatic pneumothorax Dr Webb called  No indication for chest tube at this point   Family informed   Will order chest xray I n am if desturates then will insert chest tube

## 2017-08-06 NOTE — PLAN OF CARE
Problem: Patient Care Overview (Adult)  Goal: Plan of Care Review    08/06/17 6339   Outcome Evaluation   Outcome Summary/Follow up Plan pt was confused, disoriented today Pt was incontinent in urine       Goal: Adult Individualization and Mutuality  Outcome: Ongoing (interventions implemented as appropriate)    Problem: Pressure Ulcer Risk (Sean Scale) (Adult,Obstetrics,Pediatric)  Goal: Identify Related Risk Factors and Signs and Symptoms  Outcome: Ongoing (interventions implemented as appropriate)  Goal: Skin Integrity  Outcome: Ongoing (interventions implemented as appropriate)    Problem: Pain, Acute (Adult)  Goal: Identify Related Risk Factors and Signs and Symptoms  Outcome: Ongoing (interventions implemented as appropriate)  Goal: Acceptable Pain Control/Comfort Level  Outcome: Ongoing (interventions implemented as appropriate)

## 2017-08-06 NOTE — PLAN OF CARE
Problem: Patient Care Overview (Adult)  Goal: Plan of Care Review  Outcome: Ongoing (interventions implemented as appropriate)  Goal: Adult Individualization and Mutuality  Outcome: Ongoing (interventions implemented as appropriate)  Goal: Discharge Needs Assessment  Outcome: Ongoing (interventions implemented as appropriate)    Problem: Liver Failure, Acute/Chronic (Adult)  Goal: Signs and Symptoms of Listed Potential Problems Will be Absent or Manageable (Liver Failure, Acute/Chronic)  Outcome: Ongoing (interventions implemented as appropriate)    Problem: Pressure Ulcer Risk (Sean Scale) (Adult,Obstetrics,Pediatric)  Goal: Identify Related Risk Factors and Signs and Symptoms  Outcome: Ongoing (interventions implemented as appropriate)  Goal: Skin Integrity  Outcome: Ongoing (interventions implemented as appropriate)    Problem: Pain, Acute (Adult)  Goal: Identify Related Risk Factors and Signs and Symptoms  Outcome: Ongoing (interventions implemented as appropriate)  Goal: Acceptable Pain Control/Comfort Level  Outcome: Ongoing (interventions implemented as appropriate)

## 2017-08-06 NOTE — PROGRESS NOTES
Chandrakant You DO,[facility]  Gastroenterology  Hepatology  Endoscopy  Board Certified in Internal Medicine and gastroenterology  [address], [address]  [place]. [address]  - [phone]   F - [phone]     GASTROENTEROLOGY PROGRESS NOTE   CHANDRAKANT YOU DO.         SUBJECTIVE:   8/6/2017  Chief Complaint:     Subjective  : Confusion and currently being treated for active delirium tremors.  The tachycardia seems to be less.  No abdominal pain that is present when pressed on the abdomen.     CURRENT MEDICATIONS/OBJECTIVE/VS/PE:     Current Medications:     Current Facility-Administered Medications   Medication Dose Route Frequency Provider Last Rate Last Dose   • acetaminophen (TYLENOL) tablet 650 mg  650 mg Oral Q4H PRN Mo Garcia MD       • bisacodyl (DULCOLAX) suppository 10 mg  10 mg Rectal Daily PRN Mo Garcia MD       • cyclobenzaprine (FLEXERIL) tablet 10 mg  10 mg Oral BID Mo Garcia MD   10 mg at 08/06/17 0901   • dextrose (D50W) solution 50 mL  50 mL Intravenous Q1H PRN Diogo Dickerson MD   50 mL at 08/02/17 0034   • docusate sodium (COLACE) capsule 100 mg  100 mg Oral BID Mo Garcia MD   100 mg at 08/06/17 0901   • famotidine (PEPCID) tablet 20 mg  20 mg Oral BID Mo Garcia MD   20 mg at 08/06/17 0851   • furosemide (LASIX) injection 20 mg  20 mg Intravenous Q12H Roc Barnes MD       • hydrALAZINE (APRESOLINE) injection 10 mg  10 mg Intravenous Q6H PRN Mo Garcia MD       • ibuprofen (ADVIL,MOTRIN) tablet 600 mg  600 mg Oral Q6H PRN Lobo Gu MD       • LORazepam (ATIVAN) tablet 0.5 mg  0.5 mg Oral Q2H PRN Lobo Gu MD        Or   • LORazepam (ATIVAN) injection 0.5 mg  0.5 mg Intravenous Q2H PRN Lobo Gu MD        Or   • LORazepam (ATIVAN) tablet 1 mg  1 mg Oral Q1H PRN Lobo Gu MD        Or   • LORazepam (ATIVAN) injection 1 mg  1 mg Intravenous Q1H PRN Lobo Gu MD          Or   • LORazepam (ATIVAN) injection 1 mg  1 mg Intravenous Q15 Min PRN Lobo Gu MD        Or   • LORazepam (ATIVAN) injection 1 mg  1 mg Intramuscular Q15 Min PRN Lobo Gu MD        Or   • LORazepam (ATIVAN) tablet 2 mg  2 mg Oral Q1H PRN Lobo Gu MD        Or   • LORazepam (ATIVAN) injection 2 mg  2 mg Intravenous Q1H PRN Lobo Gu MD   2 mg at 08/06/17 0844   • LORazepam (ATIVAN) injection 1 mg  1 mg Intravenous Q6H PRN Mo Garcia MD       • magnesium hydroxide (MILK OF MAGNESIA) suspension 2400 mg/10mL 10 mL  10 mL Oral Daily PRN Mo Garcia MD       • magnesium sulfate 1 g in sodium chloride 0.9 % 50 mL IVPB  1 g Intravenous BID Lobo Gu  mL/hr at 08/06/17 0902 1 g at 08/06/17 0902   • midodrine (PROAMATINE) tablet 10 mg  10 mg Oral TID AC Roc Barnes MD       • Morphine sulfate (PF) injection 1 mg  1 mg Intravenous Q4H PRN Roc Barnes MD   1 mg at 08/05/17 1834   • nicotine (NICODERM CQ) 21 MG/24HR patch 1 patch  1 patch Transdermal Q24H Mo Garcia MD   1 patch at 08/06/17 0855   • norepinephrine (LEVOPHED) 8,000 mcg in sodium chloride 0.9 % 250 mL (32 mcg/mL) infusion  0.02-0.3 mcg/kg/min Intravenous Titrated Lobo Gu MD       • octreotide (sandoSTATIN) injection 50 mcg  50 mcg Intravenous TID Roc Barnes MD   50 mcg at 08/06/17 0851   • ondansetron (ZOFRAN) tablet 4 mg  4 mg Oral Q6H PRN Mo Garcia MD        Or   • ondansetron ODT (ZOFRAN-ODT) disintegrating tablet 4 mg  4 mg Oral Q6H PRN Mo Garcia MD        Or   • ondansetron (ZOFRAN) injection 4 mg  4 mg Intravenous Q6H PRN Mo Garcia MD   4 mg at 08/05/17 1532   • pantoprazole (PROTONIX) EC tablet 40 mg  40 mg Oral QAM Mo Garcia MD   40 mg at 08/06/17 0617   • potassium chloride (MICRO-K) CR capsule 40 mEq  40 mEq Oral PRN Mo Garcia MD   40 mEq at 08/05/17 1755    Or   • potassium chloride  (KLOR-CON) packet 40 mEq  40 mEq Oral PRN Mo Garcia MD        Or   • potassium chloride 10 mEq in 100 mL IVPB  10 mEq Intravenous Q1H PRN Mo Garcia  mL/hr at 08/03/17 1445 10 mEq at 08/03/17 1445   • potassium chloride 10 mEq in 100 mL IVPB  10 mEq Intravenous Q1H PRN Lobo Gu MD       • propranolol (INDERAL) tablet 20 mg  20 mg Oral Q8H Lobo Gu MD   20 mg at 08/06/17 0617   • sodium chloride 0.9 % flush 1-10 mL  1-10 mL Intravenous PRN Mo Garcia MD   10 mL at 08/05/17 1000   • sodium chloride 0.9 % flush 10 mL  10 mL Intravenous PRN Diogo Dickerson MD       • traMADol (ULTRAM) tablet 50 mg  50 mg Oral Q8H PRN Roc Barnes MD   50 mg at 08/05/17 1117   • venlafaxine (EFFEXOR) tablet 37.5 mg  37.5 mg Oral QAM Roc Barnes MD   37.5 mg at 08/06/17 0617   • zolpidem (AMBIEN) tablet 5 mg  5 mg Oral Nightly PRN Roc Barnes MD   5 mg at 08/05/17 2158       Objective     Physical Exam:   Temp:  [97.6 °F (36.4 °C)-98.2 °F (36.8 °C)] 98.2 °F (36.8 °C)  Heart Rate:  [] 102  Resp:  [18-22] 18  BP: ()/() 90/64     Physical Exam:  General Appearance:    Alert, cooperative, in no acute distress   Head:    Normocephalic, without obvious abnormality, atraumatic   Eyes:            Lids and lashes normal, conjunctivae and sclerae normal, no   icterus, no pallor, corneas clear, PERRLA   Ears:    Ears appear intact with no abnormalities noted   Throat:   No oral lesions, no thrush, oral mucosa moist   Neck:   No adenopathy, supple, trachea midline, no thyromegaly, no     carotid bruit, no JVD   Back:     No kyphosis present, no scoliosis present, no skin lesions,       erythema or scars, no tenderness to percussion or                   palpation,   range of motion normal   Lungs:     Clear to auscultation,respirations regular, even and                   unlabored    Heart:    Regular rhythm and normal rate, normal S1 and S2, no            murmur, no  gallop, no rub, no click   Breast Exam:    Deferred   Abdomen:     Normal bowel sounds, no masses, no organomegaly, soft        non-tender,Moderate ascites, no guarding, no rebound                 tenderness   Genitalia:    Deferred   Extremities:   Moves all extremities well, no edema, no cyanosis, no              redness   Pulses:   Pulses palpable and equal bilaterally   Skin:   No bleeding, bruising or rash   Lymph nodes:   No palpable adenopathy   Neurologic:   Cranial nerves 2 - 12 grossly intact, sensation intact, DTR        present and equal bilaterally      Results Review:     Lab Results (last 24 hours)     Procedure Component Value Units Date/Time    Sodium [[id number]]  (Abnormal) Collected:  08/05/17 1540    Specimen:  Blood Updated:  08/05/17 1557     Sodium 127 (L) mmol/L     CBC Auto Differential [[id number]]  (Abnormal) Collected:  08/05/17 1846    Specimen:  Blood Updated:  08/05/17 1852     WBC 5.89 10*3/mm3      RBC 2.96 (L) 10*6/mm3      Hemoglobin 8.9 (L) g/dL      Hematocrit 26.0 (L) %      MCV 87.8 fL      MCH 30.1 pg      MCHC 34.2 g/dL      RDW 15.0 (H) %      RDW-SD 47.3 (H) fl      MPV 11.7 fL      Platelets 35 (L) 10*3/mm3      Neutrophil % 90.2 (H) %      Lymphocyte % 6.3 (L) %      Monocyte % 2.0 %      Eosinophil % 0.3 %      Basophil % 0.2 %      Immature Grans % 1.0 (H) %      Neutrophils, Absolute 5.31 10*3/mm3      Lymphocytes, Absolute 0.37 (L) 10*3/mm3      Monocytes, Absolute 0.12 10*3/mm3      Eosinophils, Absolute 0.02 10*3/mm3      Basophils, Absolute 0.01 10*3/mm3      Immature Grans, Absolute 0.06 (H) 10*3/mm3     CBC & Differential [[id number]] Collected:  08/05/17 1846    Specimen:  Blood Updated:  08/05/17 1937    Narrative:       The following orders were created for panel order CBC & Differential.  Procedure                               Abnormality         Status                     ---------                               -----------         ------                        Scan Slide[[id number]]                                       Final result               CBC Auto Differential[[id number]]        Abnormal            Final result                 Please view results for these tests on the individual orders.    Scan Slide [[id number]] Collected:  08/05/17 1846    Specimen:  Blood Updated:  08/05/17 1937     Anisocytosis Slight/1+     Hypochromia Slight/1+     Target Cells Mod/2+     Toxic Granulation Mod/2+     Vacuolated Neutrophils Slight/1+     Platelet Estimate Decreased     Clumped Platelets --      NONE SEEN       CBC Auto Differential [[id number]]  (Abnormal) Collected:  08/05/17 2141    Specimen:  Blood Updated:  08/05/17 2147     WBC 6.29 10*3/mm3      RBC 2.95 (L) 10*6/mm3      Hemoglobin 9.0 (L) g/dL      Hematocrit 25.5 (L) %      MCV 86.4 fL      MCH 30.5 pg      MCHC 35.3 g/dL      RDW 15.3 (H) %      RDW-SD 47.8 (H) fl      MPV 11.1 fL      Platelets 29 (L) 10*3/mm3      Neutrophil % 88.7 (H) %      Lymphocyte % 6.2 (L) %      Monocyte % 4.6 %      Eosinophil % 0.0 %      Basophil % 0.2 %      Immature Grans % 0.3 %      Neutrophils, Absolute 5.58 10*3/mm3      Lymphocytes, Absolute 0.39 (L) 10*3/mm3      Monocytes, Absolute 0.29 10*3/mm3      Eosinophils, Absolute 0.00 10*3/mm3      Basophils, Absolute 0.01 10*3/mm3      Immature Grans, Absolute 0.02 10*3/mm3     Basic Metabolic Panel [[id number]]  (Abnormal) Collected:  08/05/17 2140    Specimen:  Blood Updated:  08/05/17 2156     Glucose 106 (H) mg/dL      BUN 11 mg/dL      Creatinine 0.41 (L) mg/dL      Sodium 129 (L) mmol/L      Potassium 3.5 mmol/L      Chloride 96 mmol/L      CO2 29.0 mmol/L      Calcium 6.9 (L) mg/dL      eGFR  African Amer 210 (H) mL/min/1.73      BUN/Creatinine Ratio 26.8 (H)     Anion Gap 4.0 (L) mmol/L     CBC & Differential [[id number]] Collected:  08/05/17 2141    Specimen:  Blood Updated:  08/05/17 2213    Narrative:       The following orders were created for panel order CBC &  Differential.  Procedure                               Abnormality         Status                     ---------                               -----------         ------                     Scan Slide[[id number]]                                       Final result               CBC Auto Differential[[id number]]        Abnormal            Final result                 Please view results for these tests on the individual orders.    Scan Slide [[id number]] Collected:  08/05/17 2141    Specimen:  Blood Updated:  08/05/17 2213     Anisocytosis Mod/2+     Hypochromia Mod/2+     Dohle Bodies Present     Toxic Granulation Mod/2+     Vacuolated Neutrophils Mod/2+     Platelet Estimate Decreased    CBC Auto Differential [[id number]]  (Abnormal) Collected:  08/06/17 0535    Specimen:  Blood Updated:  08/06/17 0546     WBC 5.94 10*3/mm3      RBC 3.00 (L) 10*6/mm3      Hemoglobin 9.2 (L) g/dL      Hematocrit 26.4 (L) %      MCV 88.0 fL      MCH 30.7 pg      MCHC 34.8 g/dL      RDW 15.2 (H) %      RDW-SD 48.4 (H) fl      MPV 12.3 (H) fL      Platelets 32 (L) 10*3/mm3      Neutrophil % 90.5 (H) %      Lymphocyte % 5.6 (L) %      Monocyte % 3.0 %      Eosinophil % 0.3 %      Basophil % 0.3 %      Immature Grans % 0.3 %      Neutrophils, Absolute 5.37 10*3/mm3      Lymphocytes, Absolute 0.33 (L) 10*3/mm3      Monocytes, Absolute 0.18 10*3/mm3      Eosinophils, Absolute 0.02 10*3/mm3      Basophils, Absolute 0.02 10*3/mm3      Immature Grans, Absolute 0.02 10*3/mm3     CBC & Differential [[id number]] Collected:  08/06/17 0535    Specimen:  Blood Updated:  08/06/17 0547    Narrative:       The following orders were created for panel order CBC & Differential.  Procedure                               Abnormality         Status                     ---------                               -----------         ------                     Scan Slide[[id number]]                                                                  CBC Auto  Differential[[id number]]        Abnormal            Final result                 Please view results for these tests on the individual orders.    Comprehensive Metabolic Panel [[id number]]  (Abnormal) Collected:  08/06/17 0535    Specimen:  Blood Updated:  08/06/17 0603     Glucose 98 mg/dL      BUN 11 mg/dL      Creatinine 0.41 (L) mg/dL      Sodium 134 (L) mmol/L      Potassium 3.5 mmol/L      Chloride 100 mmol/L      CO2 27.0 mmol/L      Calcium 6.9 (L) mg/dL      Total Protein 4.1 (L) g/dL      Albumin 1.90 (L) g/dL      ALT (SGPT) 51 U/L      AST (SGOT) 33 U/L      Alkaline Phosphatase 75 U/L      Total Bilirubin 1.5 (H) mg/dL      eGFR  African Amer 210 (H) mL/min/1.73      Globulin 2.2 (L) gm/dL      A/G Ratio 0.9 (L) g/dL      BUN/Creatinine Ratio 26.8 (H)     Anion Gap 7.0 mmol/L     Magnesium [[id number]]  (Abnormal) Collected:  08/06/17 0535    Specimen:  Blood Updated:  08/06/17 0603     Magnesium 1.5 (L) mg/dL     Bilirubin, Direct [[id number]]  (Normal) Collected:  08/06/17 0535    Specimen:  Blood Updated:  08/06/17 0603     Bilirubin, Direct 0.2 mg/dL            I reviewed the patient's new clinical results.  I reviewed the patient's new imaging results and agree with the interpretation.     ASSESSMENT/PLAN:   ASSESSMENT:   1.  Cirrhosis secondary to alcohol  2.  Ascites secondary to cirrhosis.  No evidence of any portal vein thrombosis  3.  Anemia secondary to chronic disease as well as alcohol-induced bone marrow suppression as well as blood loss  4.  Tachycardia, improving, may be related to the patient's delirium tremors  5.  Active delirium tremors    PLAN:   1.  Supportive care is in place  2.  Repeat paracentesis as needed      Chandrakant You DO  08/06/17  1:14 PM

## 2017-08-06 NOTE — PROGRESS NOTES
"[facility]  [address] [place]. [address]  T - [phone]  F - [phone]     Progress Note          PATIENT  DEMOGRAPHICS   PATIENT NAME: Joseline Parnell                      PHYSICIAN: Roc Barnes MD  : 1980  MRN: [id number]   LOS: 4 days    Patient Care Team:  ERASMO Alvarez as PCP - General (Family Medicine)  Subjective   SUBJECTIVE     Confused concerned for DTs. S/p parham       Objective   OBJECTIVE   Vital Signs  Temp:  [97.2 °F (36.2 °C)-98.2 °F (36.8 °C)] 98.2 °F (36.8 °C)  Heart Rate:  [] 102  Resp:  [16-22] 18  BP: ()/() 90/64    Flowsheet Rows         First Filed Value    Admission Height  65\" (165.1 cm) Documented at 2017    Admission Weight  120 lb (54.4 kg) Documented at 2017 004           I/O last 3 completed shifts:  In: 1593.3 [P.O.:200; I.V.:1143.3; IV Piggyback:250]  Out: 3425 [Urine:1925; Stool:1500]    PHYSICAL EXAM    Physical Exam   Constitutional: She is oriented to person, place, and time. She appears well-developed.   HENT:   Head: Normocephalic.   Eyes: Pupils are equal, round, and reactive to light.   Cardiovascular: Normal rate, regular rhythm and normal heart sounds.    Pulmonary/Chest: Effort normal and breath sounds normal.   Abdominal: Soft. Bowel sounds are normal. She exhibits distension.   Neurological: She is alert and oriented to person, place, and time.       RESULTS   Results Review:      Results from last 7 days  Lab Units 17  0535 17  2140 17  1540 17  0641 17  0518   SODIUM mmol/L 134* 129* 127* 129* 127*   POTASSIUM mmol/L 3.5 3.5  --  3.2* 3.0*   CHLORIDE mmol/L 100 96  --  94* 92*   CO2 mmol/L 27.0 29.0  --  30.0 30.0   BUN mg/dL 11 11  --  11 10   CREATININE mg/dL 0.41* 0.41*  --  0.43* 0.44*   CALCIUM mg/dL 6.9* 6.9*  --  6.7* 7.0*   BILIRUBIN mg/dL 1.5*  --   --  1.6* 1.4*   ALK PHOS U/L 75  --   --  81 78   ALT (SGPT) U/L 51  --   --  63* 63*   "   AST (SGOT) U/L 33  --   --  59* 72*   GLUCOSE mg/dL 98 106*  --  66 73       Estimated Creatinine Clearance: 144.2 mL/min (by C-G formula based on Cr of 0.41).      Results from last 7 days  Lab Units 08/06/17  0535 08/05/17  0641 08/04/17  0518   MAGNESIUM mg/dL 1.5* 1.4* 1.4*         Results from last 7 days  Lab Units 08/02/17  1115   URIC ACID mg/dL 6.8         Results from last 7 days  Lab Units 08/06/17  0535 08/05/17  2141 08/05/17  1846 08/05/17  1201 08/05/17  0641   WBC 10*3/mm3 5.94 6.29 5.89 5.87 6.11   HEMOGLOBIN g/dL 9.2* 9.0* 8.9* 9.6* 9.5*   PLATELETS 10*3/mm3 32* 29* 35* 41* 34*         Results from last 7 days  Lab Units 08/01/17  2336   INR  0.92         Imaging Results (last 24 hours)     ** No results found for the last 24 hours. **           MEDICATIONS      cyclobenzaprine 10 mg Oral BID   docusate sodium 100 mg Oral BID   famotidine 20 mg Oral BID   furosemide 20 mg Intravenous TID   magnesium sulfate 1 g IVPB 1 g Intravenous BID   midodrine 2.5 mg Oral TID AC   nicotine 1 patch Transdermal Q24H   octreotide 50 mcg Intravenous TID   pantoprazole 40 mg Oral QAM   propranolol 20 mg Oral Q8H   venlafaxine 37.5 mg Oral QAM       norepinephrine 0.02-0.3 mcg/kg/min    sodium chloride 35 mL/hr Last Rate: Stopped (08/06/17 0633)       Assessment/Plan   ASSESSMENT / PLAN    Principal Problem:    Alcoholic cirrhosis of liver with ascites  Active Problems:    Chronic midline low back pain    Chronic pelvic pain in female    Ascites due to alcoholic cirrhosis    Moderate episode of recurrent major depressive disorder    1.hyponatremia.  This is likely secondary to hypervolemic state in the setting of chronic liver disease.  Also she is on spironolactone which is recently started and that can drop the sodium.  She is also on mirtazapine and effexor which can cause SIADH.     Urine na is low suggesting heparo renal / volume depletion. Didn't respond with 0.9 and therefore 3% now. Na upto 134 and we have  stopped 3% may need it again. Lower lasix to bid. tsh and uric acid normal . keep Lasix 20 mg IV twice a day. Tolvaptan is contraindicated because of liver cirrhosis.  effexor lowered    2.alcoholic liver cirrhosis with marked ascites recent paracentesis about a month ago.  Patient has now abdominal distention with left lower chest pain.  s/p paracentesis had 10 L removed. Keep sandostatin midodrine and albumin. Keep lasix. Will need aldactone once na improved. Increase midodrine to 10mg tid     3.chronic back pain/depression onultram po and iv morphine. Avoid nsaids due to risk of variceal bleeding    4. Anemia s/p  2 U PRBC    5. Hypokalemia replace mg, k better    6. Hypocalcemia with low albumin - vitamin d deficiency start replacement              This document has been electronically signed by Roc Barnes MD on August 6, 2017 11:51 AM

## 2017-08-07 PROBLEM — E87.6 HYPOKALEMIA: Status: ACTIVE | Noted: 2017-01-01

## 2017-08-07 PROBLEM — E87.1 HYPONATREMIA: Status: ACTIVE | Noted: 2017-01-01

## 2017-08-07 PROBLEM — S27.0XXA TRAUMATIC PNEUMOTHORAX: Status: ACTIVE | Noted: 2017-01-01

## 2017-08-07 NOTE — PROGRESS NOTES
"[facility]  [address] [place] [address]. [address]   - [phone]  F - [phone]     Progress Note          PATIENT  DEMOGRAPHICS   PATIENT NAME: Joseline Parnell                      PHYSICIAN: Roc Barnes MD  : 1980  MRN: [id number]   LOS: 5 days    Patient Care Team:  ERASMO Alvarez as PCP - General (Family Medicine)  Subjective   SUBJECTIVE     More awake today still has abdo pain and watery diarrhea       Objective   OBJECTIVE   Vital Signs  Temp:  [98 °F (36.7 °C)-98.3 °F (36.8 °C)] 98.3 °F (36.8 °C)  Heart Rate:  [] 96  Resp:  [16-22] 22  BP: ()/(53-78) 93/57    Flowsheet Rows         First Filed Value    Admission Height  65\" (165.1 cm) Documented at 2017    Admission Weight  120 lb (54.4 kg) Documented at 2017 0046           I/O last 3 completed shifts:  In: 807 [P.O.:420; I.V.:387]  Out: 3550 [Urine:2050; Stool:1500]    PHYSICAL EXAM    Physical Exam   Constitutional: She is oriented to person, place, and time. She appears well-developed.   HENT:   Head: Normocephalic.   Eyes: Pupils are equal, round, and reactive to light.   Cardiovascular: Normal rate, regular rhythm and normal heart sounds.    Pulmonary/Chest: Effort normal and breath sounds normal.   Abdominal: Soft. Bowel sounds are normal. She exhibits distension.   Neurological: She is alert and oriented to person, place, and time.       RESULTS   Results Review:      Results from last 7 days  Lab Units 17  0526 17  0535 17  2140  17  0641   SODIUM mmol/L 131* 134* 129*  < > 129*   POTASSIUM mmol/L 3.0* 3.5 3.5  --  3.2*   CHLORIDE mmol/L 96 100 96  --  94*   CO2 mmol/L 29.0 27.0 29.0  --  30.0   BUN mg/dL 14 11 11  --  11   CREATININE mg/dL 0.45* 0.41* 0.41*  --  0.43*   CALCIUM mg/dL 7.1* 6.9* 6.9*  --  6.7*   BILIRUBIN mg/dL 2.2* 1.5*  --   --  1.6*   ALK PHOS U/L 79 75  --   --  81   ALT (SGPT) U/L 57* 51  --   --  63*   AST (SGOT) U/L 30 33  -- "   --  59*   GLUCOSE mg/dL 64 98 106*  --  66   < > = values in this interval not displayed.    Estimated Creatinine Clearance: 111.9 mL/min (by C-G formula based on Cr of 0.45).      Results from last 7 days  Lab Units 08/07/17  0526 08/06/17  0535 08/05/17  0641   MAGNESIUM mg/dL 1.7 1.5* 1.4*         Results from last 7 days  Lab Units 08/02/17  1115   URIC ACID mg/dL 6.8         Results from last 7 days  Lab Units 08/07/17  0526 08/06/17  1831 08/06/17  1332 08/06/17  0535 08/05/17  2141   WBC 10*3/mm3 5.77 5.67 5.15 5.94 6.29   HEMOGLOBIN g/dL 9.2* 8.5* 8.8* 9.2* 9.0*   PLATELETS 10*3/mm3 42* 33* 33* 32* 29*         Results from last 7 days  Lab Units 08/01/17  2336   INR  0.92         Imaging Results (last 24 hours)     Procedure Component Value Units Date/Time    CT Angiogram Chest With Contrast [[id number]] Collected:  08/06/17 1556     Updated:  08/06/17 1639    Narrative:       Patient Name:  KAIDEN ALONSO  Patient ID:  [id number]   Ordering:  DANA RIZZO  Attending:  GENESIS NGUYEN  Referring:  DANA RIZZO  ------------------------------------------------    CT angiogram of the chest with contrast    History: Shortness of breath    Axial spiral scans of the chest were obtained  with  intravenous  contrast.  Coronal and sagittal reconstructions and both oblique  coronal MIPS performed at the same workstation.    This exam was performed according to our departmental  dose-optimization program, which includes automated exposure  control, adjustment of the mA and/or kV according to patient size  and/or use of iterative reconstruction technique.    DLP: 169.10    Comparison: None    No pulmonary embolism.  No mediastinal hematoma.  No hilar, mediastinal or axillary adenopathy.  No thoracic aortic aneurysm or dissection.  No pericardial effusion.    Right internal jugular catheter in place with tip in the distal  superior vena cava.  Chronic obstructive pulmonary disease.  Approximately 30%  right pneumothorax.  Increased interstitial markings in each lung which may be chronic  or may represent interstitial pneumonia or interstitial edema.  Discoid and subsegmental atelectasis in each lower lobe.  No mass or noncalcified nodule.  No pleural effusion.    Large amount of ascites.  Focal 1.2 cm filling defect right anterior lateral aspect  proximal abdominal aorta which may represent a mass or focal  thrombus.    No acute osseous abnormality.      Impression:       Conclusion:  No pulmonary embolism.  Right internal jugular catheter in place with tip in the distal  superior vena cava.  Chronic obstructive pulmonary disease.  Approximately 30% right pneumothorax.  Increased interstitial markings in each lung which may be chronic  or may represent interstitial pneumonia or interstitial edema.  Discoid and subsegmental atelectasis in each lower lobe.  Large amount of ascites.  Focal 1.2 cm filling defect right anterior lateral aspect  proximal abdominal aorta which may represent a mass or focal  thrombus.    Report called at approximately 4:45 PM CST.    [id number]    Electronically signed by:  Harshad Polanco MD  8/6/2017 4:38 PM CDT  Workstation: [employer] Chest 1 View [[id number]] Collected:  08/07/17 0415     Updated:  08/07/17 0437    Narrative:         Chest single view on  8/7/2017     CLINICAL INDICATION: Follow-up right-sided pneumothorax    COMPARISON: Chest CT from 8/6/2016 and chest x-ray from 8/2/2017    FINDINGS: There is a stable small to moderate-sized right  pneumothorax. There is approximately 1.7 cm of pleural separation  superiorly with 2.3 cm of pleural separation laterally. Even  though this is stable this appears large enough to consider chest  tube placement. Recommend at least close clinical follow-up and  short-term follow-up imaging. There is mild bibasilar  atelectasis. Right IJ catheter tip is in the SVC. Heart is within  normal limits for size.      Impression:       Stable  small to moderate size right pneumothorax that  is large enough for chest tube placement but stability may mean  this may not require chest tube placement. Recommend at least  close clinical follow-up and short-term follow-up imaging.    Electronically signed by:  Ge Haney  8/7/2017 4:36 AM CDT  Workstation: [id number]           MEDICATIONS      cyclobenzaprine 10 mg Oral BID   docusate sodium 100 mg Oral BID   famotidine 20 mg Oral BID   furosemide 20 mg Intravenous Q12H   magnesium sulfate 1 g IVPB 1 g Intravenous BID   midodrine 10 mg Oral TID AC   nicotine 1 patch Transdermal Q24H   octreotide 50 mcg Intravenous TID   pantoprazole 40 mg Oral QAM   propranolol 20 mg Oral Q8H   venlafaxine 37.5 mg Oral QAM       norepinephrine 0.02-0.3 mcg/kg/min       Assessment/Plan   ASSESSMENT / PLAN    Principal Problem:    Alcoholic cirrhosis of liver with ascites  Active Problems:    Chronic midline low back pain    Chronic pelvic pain in female    Ascites due to alcoholic cirrhosis    Moderate episode of recurrent major depressive disorder    1.hyponatremia.  This is likely secondary to hypervolemic state in the setting of chronic liver disease.  Also she is on spironolactone which is recently started and that can drop the sodium.  She is also on mirtazapine and effexor which can cause SIADH.     Urine na is low suggesting heparo renal / volume depletion. Didn't respond with 0.9 and therefore 3% now. Na again drop after stopping 3% saline. Start salt tab . Wean from effexor. Keep lasix. Replace k. tsh and uric acid normal . Tolvaptan is contraindicated because of liver cirrhosis.     2.alcoholic liver cirrhosis with marked ascites recent paracentesis about a month ago.  Patient has now abdominal distention with left lower chest pain.  s/p paracentesis had 10 L removed. Keep sandostatin midodrine and albumin. Keep lasix. Will need aldactone once na improved.     3.chronic back pain/depression onultram po and iv  morphine. Avoid nsaids due to risk of variceal bleeding    4. Anemia s/p  2 U PRBC    5. Hypokalemia mg better, replace k    6. Hypocalcemia with low albumin - vitamin d deficiency start replacement              This document has been electronically signed by Roc Barnes MD on August 7, 2017 8:51 AM

## 2017-08-07 NOTE — PLAN OF CARE
Problem: Patient Care Overview (Adult)  Goal: Plan of Care Review  Outcome: Ongoing (interventions implemented as appropriate)    08/07/17 1350   Coping/Psychosocial Response Interventions   Plan Of Care Reviewed With caregiver   Patient Care Overview   Progress no change   Outcome Evaluation   Outcome Summary/Follow up Plan Pt continues to have suboptimal po intake of meals with confusion noted. She is currently being treated for delirium tremors related to alcohol use. she is eating only 0-25% of meals and taking some of the supplements. Rd will monitor.          Problem: Liver Failure, Acute/Chronic (Adult)  Goal: Signs and Symptoms of Listed Potential Problems Will be Absent or Manageable (Liver Failure, Acute/Chronic)  Outcome: Ongoing (interventions implemented as appropriate)

## 2017-08-07 NOTE — PROGRESS NOTES
MEDICINE DAILY PROGRESS NOTE  NAME: Joseline Parnell  : 1980  MRN: [id number]     LOS: 5 days     PROVIDER OF SERVICE: Luis F Jalloh MD    Chief Complaint: Alcoholic cirrhosis of liver with ascites    Subjective:     Interval History:  History taken from: patient chart RN   Patient laying in bed today.  Rousable, but confused.  Knows what hospital she is in but not the city.  Knows name and date of birth.  Unsure of year.  No complaints of pain today.  Visibly cachectic.    Review of Systems:   Review of Systems   Constitutional: Positive for activity change and fatigue. Negative for appetite change and fever.   HENT: Negative for ear pain and sore throat.    Eyes: Negative for pain and visual disturbance.   Respiratory: Negative for cough and shortness of breath.    Cardiovascular: Negative for chest pain and palpitations.   Gastrointestinal: Positive for abdominal distention. Negative for abdominal pain and nausea.   Endocrine: Negative for cold intolerance and heat intolerance.   Genitourinary: Negative for difficulty urinating and dysuria.   Musculoskeletal: Negative for arthralgias and gait problem.   Skin: Negative for color change and rash.   Neurological: Positive for weakness. Negative for dizziness and headaches.   Hematological: Negative for adenopathy. Does not bruise/bleed easily.   Psychiatric/Behavioral: Positive for confusion. Negative for agitation and sleep disturbance.       Objective:     Vital Signs  Vitals:    17 0600 17 0605 17 0705 17 0805   BP:  (!) 88/61 (!) 82/55 93/57   BP Location:       Patient Position:       Pulse:  97 101 96   Resp:   20 22   Temp:    98.3 °F (36.8 °C)   TempSrc:    Oral   SpO2:  100%  100%   Weight: 92 lb 2.4 oz (41.8 kg)      Height:           Physical Exam  Physical Exam   Constitutional: She is oriented to person, place, and time. She appears well-developed. She appears cachectic. No distress.   HENT:   Head: Normocephalic and  atraumatic.   Right Ear: External ear normal.   Left Ear: External ear normal.   Nose: Nose normal.   Eyes: Pupils are equal, round, and reactive to light.   Neck: Normal range of motion. Neck supple.   Cardiovascular: Normal rate, regular rhythm and normal heart sounds.  Exam reveals no gallop and no friction rub.    No murmur heard.  Pulmonary/Chest: Effort normal and breath sounds normal. No respiratory distress. She has no wheezes. She has no rales. She exhibits no tenderness.   Abdominal: Soft. Bowel sounds are normal. She exhibits distension. She exhibits no mass. There is no tenderness. There is no rebound and no guarding.   Neurological: She is alert and oriented to person, place, and time.   Skin: She is not diaphoretic.       Medication Review    Current Facility-Administered Medications:   •  acetaminophen (TYLENOL) tablet 650 mg, 650 mg, Oral, Q4H PRN, Mo Garcia MD  •  bisacodyl (DULCOLAX) suppository 10 mg, 10 mg, Rectal, Daily PRN, Mo Garcia MD  •  cyclobenzaprine (FLEXERIL) tablet 10 mg, 10 mg, Oral, BID, Mo Garcia MD, 10 mg at 08/07/17 0835  •  dextrose (D50W) solution 50 mL, 50 mL, Intravenous, Q1H PRN, Diogo Dickerson MD, 50 mL at 08/02/17 0034  •  docusate sodium (COLACE) capsule 100 mg, 100 mg, Oral, BID, Mo Garcia MD, 100 mg at 08/06/17 1813  •  famotidine (PEPCID) tablet 20 mg, 20 mg, Oral, BID, Mo Garcia MD, 20 mg at 08/07/17 0836  •  furosemide (LASIX) injection 20 mg, 20 mg, Intravenous, Q12H, Roc Barnes MD, 20 mg at 08/07/17 0836  •  hydrALAZINE (APRESOLINE) injection 10 mg, 10 mg, Intravenous, Q6H PRN, Mo Garcia MD  •  ibuprofen (ADVIL,MOTRIN) tablet 600 mg, 600 mg, Oral, Q6H PRN, Lobo Gu MD  •  LORazepam (ATIVAN) tablet 0.5 mg, 0.5 mg, Oral, Q2H PRN **OR** LORazepam (ATIVAN) injection 0.5 mg, 0.5 mg, Intravenous, Q2H PRN **OR** LORazepam (ATIVAN) tablet 1 mg, 1 mg, Oral, Q1H PRN **OR** LORazepam  (ATIVAN) injection 1 mg, 1 mg, Intravenous, Q1H PRN **OR** LORazepam (ATIVAN) injection 1 mg, 1 mg, Intravenous, Q15 Min PRN **OR** LORazepam (ATIVAN) injection 1 mg, 1 mg, Intramuscular, Q15 Min PRN **OR** LORazepam (ATIVAN) tablet 2 mg, 2 mg, Oral, Q1H PRN **OR** LORazepam (ATIVAN) injection 2 mg, 2 mg, Intravenous, Q1H PRN, Lobo Gu MD, 2 mg at 08/06/17 0844  •  LORazepam (ATIVAN) injection 1 mg, 1 mg, Intravenous, Q6H PRN, Mo Garcia MD  •  magnesium hydroxide (MILK OF MAGNESIA) suspension 2400 mg/10mL 10 mL, 10 mL, Oral, Daily PRN, Mo Garcia MD  •  magnesium sulfate 1 g in sodium chloride 0.9 % 50 mL IVPB, 1 g, Intravenous, BID, Lobo Gu MD, Last Rate: 100 mL/hr at 08/06/17 1813, 1 g at 08/06/17 1813  •  midodrine (PROAMATINE) tablet 10 mg, 10 mg, Oral, TID AC, Roc Barnes MD, 10 mg at 08/07/17 0642  •  Morphine sulfate (PF) injection 1 mg, 1 mg, Intravenous, Q4H PRN, Roc Barnes MD, 1 mg at 08/06/17 2103  •  nicotine (NICODERM CQ) 21 MG/24HR patch 1 patch, 1 patch, Transdermal, Q24H, Mo Garcia MD, 1 patch at 08/07/17 0838  •  norepinephrine (LEVOPHED) 8,000 mcg in sodium chloride 0.9 % 250 mL (32 mcg/mL) infusion, 0.02-0.3 mcg/kg/min, Intravenous, Titrated, Lobo Gu MD  •  octreotide (sandoSTATIN) injection 50 mcg, 50 mcg, Intravenous, TID, Rco Barnes MD, 50 mcg at 08/07/17 0836  •  ondansetron (ZOFRAN) tablet 4 mg, 4 mg, Oral, Q6H PRN **OR** ondansetron ODT (ZOFRAN-ODT) disintegrating tablet 4 mg, 4 mg, Oral, Q6H PRN **OR** ondansetron (ZOFRAN) injection 4 mg, 4 mg, Intravenous, Q6H PRN, Mo Garcia MD, 4 mg at 08/05/17 1532  •  pantoprazole (PROTONIX) EC tablet 40 mg, 40 mg, Oral, QAM, Mo Garcia MD, 40 mg at 08/07/17 0604  •  potassium chloride (MICRO-K) CR capsule 40 mEq, 40 mEq, Oral, PRN, 40 mEq at 08/05/17 1755 **OR** potassium chloride (KLOR-CON) packet 40 mEq, 40 mEq, Oral, PRN **OR** potassium chloride 10  mEq in 100 mL IVPB, 10 mEq, Intravenous, Q1H PRN, Mo Garcia MD, Last Rate: 100 mL/hr at 08/07/17 0753, 10 mEq at 08/07/17 0753  •  potassium chloride 10 mEq in 100 mL IVPB, 10 mEq, Intravenous, Q1H PRN, Lobo Gu MD  •  propranolol (INDERAL) tablet 20 mg, 20 mg, Oral, Q8H, Lobo Gu MD, 20 mg at 08/07/17 0604  •  sodium chloride 0.9 % flush 1-10 mL, 1-10 mL, Intravenous, PRN, Mo Garcia MD, 10 mL at 08/06/17 1824  •  Insert peripheral IV, , , Once **AND** sodium chloride 0.9 % flush 10 mL, 10 mL, Intravenous, PRN, Diogo Dickerson MD, 10 mL at 08/06/17 1423  •  traMADol (ULTRAM) tablet 50 mg, 50 mg, Oral, Q8H PRN, Roc Barnes MD, 50 mg at 08/05/17 1117  •  venlafaxine (EFFEXOR) tablet 37.5 mg, 37.5 mg, Oral, QAM, Roc Barnes MD, 37.5 mg at 08/07/17 0605  •  zolpidem (AMBIEN) tablet 5 mg, 5 mg, Oral, Nightly PRN, Roc Barnes MD, 5 mg at 08/05/17 2158     Diagnostic Data    Lab Results (last 24 hours)     Procedure Component Value Units Date/Time    CBC Auto Differential [[id number]]  (Abnormal) Collected:  08/06/17 1332    Specimen:  Blood Updated:  08/06/17 1339     WBC 5.15 10*3/mm3      RBC 2.88 (L) 10*6/mm3      Hemoglobin 8.8 (L) g/dL      Hematocrit 25.1 (L) %      MCV 87.2 fL      MCH 30.6 pg      MCHC 35.1 g/dL      RDW 15.4 (H) %      RDW-SD 48.5 (H) fl      MPV 11.3 fL      Platelets 33 (L) 10*3/mm3      Neutrophil % 75.7 %      Lymphocyte % 9.3 (L) %      Monocyte % 14.6 (H) %      Eosinophil % 0.0 %      Basophil % 0.2 %      Immature Grans % 0.2 %      Neutrophils, Absolute 3.90 10*3/mm3      Lymphocytes, Absolute 0.48 (L) 10*3/mm3      Monocytes, Absolute 0.75 10*3/mm3      Eosinophils, Absolute 0.00 10*3/mm3      Basophils, Absolute 0.01 10*3/mm3      Immature Grans, Absolute 0.01 10*3/mm3     Ammonia [[id number]]  (Normal) Collected:  08/06/17 1332    Specimen:  Blood Updated:  08/06/17 1353     Ammonia 12 umol/L     CBC & Differential [[id number]] Collected:   08/06/17 1332    Specimen:  Blood Updated:  08/06/17 1423    Narrative:       The following orders were created for panel order CBC & Differential.  Procedure                               Abnormality         Status                     ---------                               -----------         ------                     Scan Slide[[id number]]                                       Final result               CBC Auto Differential[[id number]]        Abnormal            Final result                 Please view results for these tests on the individual orders.    Scan Slide [[id number]] Collected:  08/06/17 1332    Specimen:  Blood Updated:  08/06/17 1423     Anisocytosis Slight/1+     Hypochromia Slight/1+     Target Cells Mod/2+     Toxic Granulation Mod/2+     Vacuolated Neutrophils Mod/2+     Platelet Estimate Decreased     Clumped Platelets --      NONE SEEN       CBC Auto Differential [[id number]]  (Abnormal) Collected:  08/06/17 1831    Specimen:  Blood Updated:  08/06/17 1838     WBC 5.67 10*3/mm3      RBC 2.91 (L) 10*6/mm3      Hemoglobin 8.5 (L) g/dL      Hematocrit 25.7 (L) %      MCV 88.3 fL      MCH 29.2 pg      MCHC 33.1 g/dL      RDW 15.2 (H) %      RDW-SD 49.0 (H) fl      MPV 13.1 (H) fL      Platelets 33 (L) 10*3/mm3      Neutrophil % 86.5 (H) %      Lymphocyte % 7.1 (L) %      Monocyte % 5.5 %      Eosinophil % 0.2 %      Basophil % 0.2 %      Immature Grans % 0.5 %      Neutrophils, Absolute 4.91 10*3/mm3      Lymphocytes, Absolute 0.40 (L) 10*3/mm3      Monocytes, Absolute 0.31 10*3/mm3      Eosinophils, Absolute 0.01 10*3/mm3      Basophils, Absolute 0.01 10*3/mm3      Immature Grans, Absolute 0.03 (H) 10*3/mm3      nRBC 0.0 /100 WBC     CBC & Differential [[id number]] Collected:  08/06/17 1831    Specimen:  Blood Updated:  08/06/17 1923    Narrative:       The following orders were created for panel order CBC & Differential.  Procedure                               Abnormality         Status                      ---------                               -----------         ------                     Scan Slide[[id number]]                                       Final result               CBC Auto Differential[[id number]]        Abnormal            Final result                 Please view results for these tests on the individual orders.    Scan Slide [[id number]] Collected:  08/06/17 1831    Specimen:  Blood Updated:  08/06/17 1923     Anisocytosis Slight/1+     Hypochromia Slight/1+     Target Cells Mod/2+     Toxic Granulation Mod/2+     Vacuolated Neutrophils Slight/1+     Platelet Estimate Decreased     Clumped Platelets --      NONE SEEN       CBC Auto Differential [[id number]]  (Abnormal) Collected:  08/07/17 0526    Specimen:  Blood Updated:  08/07/17 0624     WBC 5.77 10*3/mm3      RBC 3.06 (L) 10*6/mm3      Hemoglobin 9.2 (L) g/dL      Hematocrit 26.5 (L) %      MCV 86.6 fL      MCH 30.1 pg      MCHC 34.7 g/dL      RDW 15.4 (H) %      RDW-SD 48.6 (H) fl      MPV 12.6 (H) fL      Platelets 42 (L) 10*3/mm3      Neutrophil % 86.1 (H) %      Lymphocyte % 11.6 %      Monocyte % 1.4 %      Eosinophil % 0.2 %      Basophil % 0.2 %      Immature Grans % 0.5 %      Neutrophils, Absolute 4.97 10*3/mm3      Lymphocytes, Absolute 0.67 10*3/mm3      Monocytes, Absolute 0.08 10*3/mm3      Eosinophils, Absolute 0.01 10*3/mm3      Basophils, Absolute 0.01 10*3/mm3      Immature Grans, Absolute 0.03 (H) 10*3/mm3      nRBC 0.0 /100 WBC     CBC & Differential [[id number]] Collected:  08/07/17 0526    Specimen:  Blood Updated:  08/07/17 0626    Narrative:       The following orders were created for panel order CBC & Differential.  Procedure                               Abnormality         Status                     ---------                               -----------         ------                     Scan Slide[[id number]]                                                                  CBC Auto Differential[[id number]]        Abnormal             Final result                 Please view results for these tests on the individual orders.    Magnesium [[id number]]  (Normal) Collected:  08/07/17 0526    Specimen:  Blood Updated:  08/07/17 0640     Magnesium 1.7 mg/dL     Comprehensive Metabolic Panel [[id number]]  (Abnormal) Collected:  08/07/17 0526    Specimen:  Blood Updated:  08/07/17 0640     Glucose 64 mg/dL      BUN 14 mg/dL      Creatinine 0.45 (L) mg/dL      Sodium 131 (L) mmol/L      Potassium 3.0 (L) mmol/L      Chloride 96 mmol/L      CO2 29.0 mmol/L      Calcium 7.1 (L) mg/dL      Total Protein 4.5 (L) g/dL      Albumin 2.00 (L) g/dL      ALT (SGPT) 57 (H) U/L      AST (SGOT) 30 U/L      Alkaline Phosphatase 79 U/L      Total Bilirubin 2.2 (H) mg/dL      eGFR  African Amer 189 (H) mL/min/1.73      Globulin 2.5 gm/dL      A/G Ratio 0.8 (L) g/dL      BUN/Creatinine Ratio 31.1 (H)     Anion Gap 6.0 mmol/L     Bilirubin, Direct [[id number]]  (Abnormal) Collected:  08/07/17 0526    Specimen:  Blood Updated:  08/07/17 0640     Bilirubin, Direct 0.4 (H) mg/dL         I reviewed the patient's new clinical results.    Assessment/Plan:     Active Hospital Problems (** Indicates Principal Problem)    Diagnosis Date Noted   • **Alcoholic cirrhosis of liver with ascites [K70.31] 07/19/2017     CIWA.  GI following.  Await recommendations.     • Hypokalemia [E87.6] 08/07/2017     Replace.  Recheck.  Magnesium at 1.7 mg/dL (Normal)     • Traumatic pneumothorax [S27.0XXA] 08/07/2017     Stable on x-ray.  Dr. Wang discussed case with CT surgery yesterday.  Monitoring.  Chest tube as needed with desaturation or worsening pneumothorax on x-ray.     • Hyponatremia [E87.1] 08/07/2017     Nephrology following.     • Ascites due to alcoholic cirrhosis [K70.31] 07/19/2017     GI following.  Await recommendations.     • Moderate episode of recurrent major depressive disorder [F33.1] 07/19/2017   • Chronic midline low back pain [M54.5, G89.29] 01/17/2017   • Chronic  pelvic pain in female [R10.2, G89.29] 01/17/2017      Resolved Hospital Problems    Diagnosis Date Noted Date Resolved   No resolved problems to display.     Will monitor patient's hospital course and adjust treatment as hospital course dictates.    DVT prophylaxis: SCD/TEDs  Code status is Full Code    Plan for disposition:Unknown      Time:           This document has been electronically signed by Luis F Jalloh MD on August 7, 2017 8:39 AM

## 2017-08-07 NOTE — PAYOR COMM NOTE
"Kaiden Parnell (37 y.o. Female)     Date of Birth Social Security Number Address Home Phone MRN    1980  [address] [address] [phone] [id number]    Zoroastrian Marital Status          None Single       Admission Date Admission Type Admitting Provider Attending Provider Department, Room/Bed    17 Emergency Jose, MD Jose Moy Harshul Amrutlal, MD [facility] CRITICAL CARE,     Discharge Date Discharge Disposition Discharge Destination                      Attending Provider: Mo Garcia MD     Allergies:  Lexapro [Escitalopram Oxalate]    Isolation:  None   Infection:  None   Code Status:  FULL    Ht:  65\" (165.1 cm)   Wt:  92 lb 2.4 oz (41.8 kg)    Admission Cmt:  None   Principal Problem:  Alcoholic cirrhosis of liver with ascites [K70.31]                 Active Insurance as of 2017     Primary Coverage     Payor Plan Insurance Group Employer/Plan Group    [employer] [place] [employer] [place]ICA[place]      Payor Plan Address Payor Plan Phone Number Effective From Effective To    [address] [address] [phone] 2017     [place] [address]       Subscriber Name Subscriber Birth Date Member ID       KAIDEN PARNELL 1980 [id number]                 Emergency Contacts      (Rel.) Home Phone Work Phone Mobile Phone    Susannah Ellison (Mother) -- -- [phone]      Ciara Delaney RNCM  [facility]  [phone]    Phone  [phone]    Fax  Continued Stay Review       Physician Progress Notes (last 72 hours) (Notes from 2017 10:30 AM through 2017 10:30 AM)      Roc Barnes MD at 2017 11:14 AM  Version 1 of 1         [facility]  [address] [place] [address]. [address]  T - [phone]  F - [phone]     Progress Note          PATIENT  DEMOGRAPHICS   PATIENT NAME: Kaiden Parnell                      PHYSICIAN: Roc Barnes MD  : 1980  MRN: [id number]   " "LOS: 2 days    Patient Care Team:  ERASMO Alvarez as PCP - General (Family Medicine)  Subjective   SUBJECTIVE   Back pain         Objective   OBJECTIVE   Vital Signs  Temp:  [97.7 °F (36.5 °C)-98.7 °F (37.1 °C)] 98.6 °F (37 °C)  Heart Rate:  [122-144] 144  Resp:  [16-18] 18  BP: ()/(57-87) 113/77    Flowsheet Rows         First Filed Value    Admission Height  65\" (165.1 cm) Documented at 08/02/2017 0046    Admission Weight  120 lb (54.4 kg) Documented at 08/02/2017 0046           I/O last 3 completed shifts:  In: 1390.2 [P.O.:125; I.V.:478; Blood:587.2; IV Piggyback:200]  Out: 5650 [Urine:5400; Stool:250]    PHYSICAL EXAM    Physical Exam   Constitutional: She is oriented to person, place, and time. She appears well-developed.   HENT:   Head: Normocephalic.   Eyes: Pupils are equal, round, and reactive to light.   Cardiovascular: Normal rate, regular rhythm and normal heart sounds.    Pulmonary/Chest: Effort normal and breath sounds normal.   Abdominal: Soft. Bowel sounds are normal. She exhibits distension.   Neurological: She is alert and oriented to person, place, and time.       RESULTS   Results Review:      Results from last 7 days  Lab Units 08/04/17  0518 08/03/17  1855 08/03/17  1153 08/03/17  0401  08/01/17  2336   SODIUM mmol/L 127* 126* 125* 125*  < > 119*   POTASSIUM mmol/L 3.0* 3.7  --  3.1*  --  4.3   CHLORIDE mmol/L 92*  --   --  88*  --  86*   CO2 mmol/L 30.0  --   --  30.0  --  22.0   BUN mg/dL 10  --   --  14  --  26*   CREATININE mg/dL 0.44*  --   --  0.53  --  0.56   CALCIUM mg/dL 7.0*  --   --  6.6*  --  6.4*   BILIRUBIN mg/dL 1.4*  --   --  1.1  --  1.2   ALK PHOS U/L 78  --   --  77  --  137*   ALT (SGPT) U/L 63*  --   --  68*  --  93*   AST (SGOT) U/L 72*  --   --  93*  --  147*   GLUCOSE mg/dL 73  --   --  70  --  45*   < > = values in this interval not displayed.    Estimated Creatinine Clearance: 142.9 mL/min (by C-G formula based on Cr of 0.44).      Results from last 7 " days  Lab Units 08/04/17  0518 08/03/17  0401   MAGNESIUM mg/dL 1.4* 1.8         Results from last 7 days  Lab Units 08/02/17  1115   URIC ACID mg/dL 6.8         Results from last 7 days  Lab Units 08/04/17  0518 08/03/17  1855 08/03/17  1153 08/03/17  0804 08/03/17  0401   WBC 10*3/mm3 5.16 4.69 3.82 4.60 5.18   HEMOGLOBIN g/dL 9.6* 9.3* 5.9* 6.9* 6.8*   PLATELETS 10*3/mm3 40* 45* 50* 55* 57*         Results from last 7 days  Lab Units 08/01/17  2336   INR  0.92         Imaging Results (last 24 hours)     Procedure Component Value Units Date/Time    US Abdominal Vascular Limited [[id number]] Collected:  08/03/17 1508     Updated:  08/04/17 0809    Narrative:       DATE OF EXAM: 8/3/2017 3:07 PM CDT    PROCEDURE: US ABDOMEN    INDICATION FOR PROCEDURE: 37 years old patient presents for  evaluation of alcoholic cirrhosis with ascites.  ICD 10 codes:   K70.31.    TECHNIQUE: Multiple static grayscale images are obtained  transabdominally.    COMPARISON:  No comparable studies are available for comparison  at the time of dictation.    FINDINGS: Images of the liver reveal heterogeneous echogenicity  consistent with history of cirrhosis..  There are no dilated  intrahepatic biliary ducts. Color Doppler documents hepatopedal  blood flow within the portal vein. Peak systolic velocity within  the main portal vein is 27.2 cm/s.    The gallbladder is distended..  No gallstones are visible.  There  is no pericholecystic fluid or gallbladder wall thickening.   Common bile duct measures 3.7 mm  in greatest transverse  dimension.    The right kidney has a normal sonographic appearance without  evidence for perinephric fluid or hydronephrosis. The right  kidney measures 5.5 x 6.0 cm in transverse dimension. Right  kidney was not identified in its entirety..    The pancreas is obscured by bowel gas..    The left kidney has a grossly normal appearance without evidence  for perinephric fluid, mass or hydronephrosis. Left  kidney  measures  10.1 x 5.3 x 5.2 cm.    The spleen has a grossly normal appearance..    Abdominal aorta is partially visualized and has a normal tapered  appearance..    Inferior vena cava is obscured..    Moderate amount of ascites is visualized.      Impression:       CONCLUSION:      1.  Liver is echogenic suggesting cirrhosis.   2.  Large amount of ascites.   3.  Nonvisualization of pancreas and incomplete visualization of  the right kidney.   4.  Patent portal vein.    Electronically signed by:  Beth Polanco MD  8/3/2017 4:26 PM CDT  Workstation: [employer]    US Abdomen Complete [[id number]] Collected:  08/03/17 1507     Updated:  08/04/17 0809    Narrative:       DATE OF EXAM: 8/3/2017 3:07 PM CDT    PROCEDURE: US ABDOMEN    INDICATION FOR PROCEDURE: 37 years old patient presents for  evaluation of alcoholic cirrhosis with ascites.  ICD 10 codes:   K70.31.    TECHNIQUE: Multiple static grayscale images are obtained  transabdominally.    COMPARISON:  No comparable studies are available for comparison  at the time of dictation.    FINDINGS: Images of the liver reveal heterogeneous echogenicity  consistent with history of cirrhosis..  There are no dilated  intrahepatic biliary ducts. Color Doppler documents hepatopedal  blood flow within the portal vein. Peak systolic velocity within  the main portal vein is 27.2 cm/s.    The gallbladder is distended..  No gallstones are visible.  There  is no pericholecystic fluid or gallbladder wall thickening.   Common bile duct measures 3.7 mm  in greatest transverse  dimension.    The right kidney has a normal sonographic appearance without  evidence for perinephric fluid or hydronephrosis. The right  kidney measures 5.5 x 6.0 cm in transverse dimension. Right  kidney was not identified in its entirety..    The pancreas is obscured by bowel gas..    The left kidney has a grossly normal appearance without evidence  for perinephric fluid, mass or hydronephrosis. Left  kidney  measures  10.1 x 5.3 x 5.2 cm.    The spleen has a grossly normal appearance..    Abdominal aorta is partially visualized and has a normal tapered  appearance..    Inferior vena cava is obscured..    Moderate amount of ascites is visualized.      Impression:       CONCLUSION:      1.  Liver is echogenic suggesting cirrhosis.   2.  Large amount of ascites.   3.  Nonvisualization of pancreas and incomplete visualization of  the right kidney.   4.  Patent portal vein.    Electronically signed by:  Beth Polanco MD  8/3/2017 4:26 PM CDT  [email or web address]           MEDICATIONS      cyclobenzaprine 10 mg Oral BID   docusate sodium 100 mg Oral BID   famotidine 20 mg Oral BID   furosemide 20 mg Intravenous TID   heparin (porcine) 5,000 Units Subcutaneous Q12H   midodrine 2.5 mg Oral TID AC   nicotine 1 patch Transdermal Q24H   octreotide 50 mcg Intravenous TID   pantoprazole 40 mg Oral QAM   venlafaxine 75 mg Oral QAM       sodium chloride 25 mL/hr Last Rate: 25 mL/hr (08/03/17 2942)       Assessment/Plan   ASSESSMENT / PLAN    Principal Problem:    Alcoholic cirrhosis of liver with ascites  Active Problems:    Chronic midline low back pain    Chronic pelvic pain in female    Ascites due to alcoholic cirrhosis    Moderate episode of recurrent major depressive disorder    1.hyponatremia.  This is likely secondary to hypervolemic state in the setting of chronic liver disease.  Also she is on spironolactone which is recently started and that can drop the sodium.  She is also on mirtazapine which can cause SIADH.     Urine na is low suggesting heparo renal / volume depletion. Didn't respond with 0.9 and therefore 3% now. Increase 3% to 25 cc/hr. Check na later.  tsh and uric acid normal . keep Lasix 20 mg IV twice a day. Tolvaptan is contraindicated because of liver cirrhosis.      2.alcoholic liver cirrhosis with marked ascites recent paracentesis about a month ago.  Patient has now abdominal distention with  left lower chest pain.  s/p paracentesis had 10 L removed. Keep sandostatin midodrine and albumin.      3.chronic back pain/depression    4. Anemia s/p  2 U PRBC today    5. Hypokalemia replace today replace mg    6. Hypocalcemia with low albumin - check vitamin d              This document has been electronically signed by Roc Barnes MD on August 4, 2017 11:14 AM            Electronically signed by Roc Barnes MD at 8/4/2017 11:16 AM      Lobo Gu MD at 8/4/2017 12:20 PM  Version 1 of 1             [facility] Medicine Services  INPATIENT PROGRESS NOTE    Length of Stay: 2  Date of Admission: 8/1/2017  Primary Care Physician: ERASMO Alvarez    Subjective   Chief Complaint:   Chief Complaint   Patient presents with   • Abdominal Pain       HPI:  HPI      Review of Systems   Fatigue .weakness    All pertinent negatives and positives are as above. All other systems have been reviewed and are negative unless otherwise stated.     Objective    Temp:  [97.7 °F (36.5 °C)-98.7 °F (37.1 °C)] 98.6 °F (37 °C)  Heart Rate:  [122-144] 144  Resp:  [16-18] 18  BP: ()/(57-87) 113/77  Physical Exam   Constitutional: She appears lethargic. She appears cachectic. She appears ill.   Cardiovascular: S1 normal and normal heart sounds.  Exam reveals no gallop.    No murmur heard.  Pulmonary/Chest: Effort normal and breath sounds normal. She has no wheezes. She has no rhonchi. She has no rales.   Abdominal: Soft. Bowel sounds are normal. She exhibits distension and ascites.       Vascular Status -  Her exam exhibits right foot edema. Her exam exhibits left foot edema.  Neurological: She appears lethargic. A sensory deficit is present. No cranial nerve deficit.           Results Review:  I have reviewed the labs, radiology results, and diagnostic studies.    Laboratory Data:   Lab Results (last 24 hours)     Procedure Component Value Units Date/Time    CBC & Differential  [[id number]] Collected:  08/03/17 1153    Specimen:  Blood Updated:  08/03/17 1307    Narrative:       The following orders were created for panel order CBC & Differential.  Procedure                               Abnormality         Status                     ---------                               -----------         ------                     Manual Differential[[id number]]                                                         Scan Slide[[id number]]                                       Final result               CBC Auto Differential[[id number]]        Abnormal            Final result                 Please view results for these tests on the individual orders.    Scan Slide [[id number]] Collected:  08/03/17 1153    Specimen:  Blood Updated:  08/03/17 1307     Anisocytosis Slight/1+     Hypochromia Slight/1+      Few target cells observed        WBC Morphology Normal     Platelet Estimate Decreased    Potassium [[id number]]  (Normal) Collected:  08/03/17 1855    Specimen:  Blood from Blood, Venous Line Updated:  08/03/17 1921     Potassium 3.7 mmol/L     Sodium [[id number]]  (Abnormal) Collected:  08/03/17 1855    Specimen:  Blood from Blood, Venous Line Updated:  08/03/17 1921     Sodium 126 (L) mmol/L     CBC (No Diff) [[id number]]  (Abnormal) Collected:  08/03/17 1855    Specimen:  Blood from Blood, Venous Line Updated:  08/03/17 1933     WBC 4.69 10*3/mm3      RBC 3.08 (L) 10*6/mm3      Hemoglobin 9.3 (L) g/dL       PT RECEIVED 2 UNITS RBCS TODAY        Hematocrit 27.2 (L) %      MCV 88.3 fL      MCH 30.2 pg      MCHC 34.2 g/dL      RDW 14.7 (H) %      RDW-SD 47.1 (H) fl      MPV 10.5 fL      Platelets 45 (L) 10*3/mm3       PREVIOUSLY CALLED RESULTS. SLIDE SCAN WITHIN THE LAST 3 DAYS       Vitamin D 25 Hydroxy [[id number]] Collected:  08/04/17 0518    Specimen:  Blood Updated:  08/04/17 0518    Magnesium [[id number]]  (Abnormal) Collected:  08/04/17 0518    Specimen:  Blood Updated:  08/04/17 0537     Magnesium 1.4  (L) mg/dL     CBC & Differential [[id number]] Collected:  08/04/17 0518    Specimen:  Blood Updated:  08/04/17 0537    Narrative:       The following orders were created for panel order CBC & Differential.  Procedure                               Abnormality         Status                     ---------                               -----------         ------                     Scan Slide[[id number]]                                                                  CBC Auto Differential[[id number]]        Abnormal            Final result                 Please view results for these tests on the individual orders.    CBC Auto Differential [[id number]]  (Abnormal) Collected:  08/04/17 0518    Specimen:  Blood Updated:  08/04/17 0537     WBC 5.16 10*3/mm3      RBC 3.20 (L) 10*6/mm3      Hemoglobin 9.6 (L) g/dL      Hematocrit 27.9 (L) %      MCV 87.2 fL      MCH 30.0 pg      MCHC 34.4 g/dL      RDW 14.8 (H) %      RDW-SD 46.5 (H) fl      MPV 11.4 fL      Platelets 40 (L) 10*3/mm3      Neutrophil % 93.2 (H) %      Lymphocyte % 5.4 (L) %      Monocyte % 0.4 %      Eosinophil % 0.2 %      Basophil % 0.2 %      Immature Grans % 0.6 (H) %      Neutrophils, Absolute 4.81 10*3/mm3      Lymphocytes, Absolute 0.28 (L) 10*3/mm3      Monocytes, Absolute 0.02 10*3/mm3      Eosinophils, Absolute 0.01 10*3/mm3      Basophils, Absolute 0.01 10*3/mm3      Immature Grans, Absolute 0.03 (H) 10*3/mm3     Comprehensive Metabolic Panel [[id number]]  (Abnormal) Collected:  08/04/17 0518    Specimen:  Blood Updated:  08/04/17 0537     Glucose 73 mg/dL      BUN 10 mg/dL      Creatinine 0.44 (L) mg/dL      Sodium 127 (L) mmol/L      Potassium 3.0 (L) mmol/L      Chloride 92 (L) mmol/L      CO2 30.0 mmol/L      Calcium 7.0 (L) mg/dL      Total Protein 4.4 (L) g/dL      Albumin 2.10 (L) g/dL      ALT (SGPT) 63 (H) U/L      AST (SGOT) 72 (H) U/L      Alkaline Phosphatase 78 U/L      Total Bilirubin 1.4 (H) mg/dL      eGFR   Amer 194 (H)  mL/min/1.73      Globulin 2.3 gm/dL      A/G Ratio 0.9 (L) g/dL      BUN/Creatinine Ratio 22.7     Anion Gap 5.0 mmol/L     Bilirubin, Direct [[id number]]  (Normal) Collected:  08/04/17 0518    Specimen:  Blood Updated:  08/04/17 0537     Bilirubin, Direct 0.0 mg/dL     Urine Culture [[id number]] Collected:  08/02/17 1854    Specimen:  Urine from Urine, Clean Catch Updated:  08/04/17 0602     Urine Culture Mixed Culture    Narrative:         Specimen contains mixed organisms of questionable pathogenicity which indicates contamination with commensal sacha.  Further identification is unlikely to provide clinically useful information.  Suggest recollection.    Alpha - 1 - Antitrypsin [[id number]]  (Abnormal) Collected:  08/02/17 2004    Specimen:  Blood Updated:  08/04/17 1114     A-1 Antitrypsin 275 (H) mg/dL     Narrative:       Performed at:  [address] [place] [address]  [id number]  : Adria Howe PhD, Phone:  [phone]    Antinuclear Antibodies Direct [[id number]] Collected:  08/02/17 2004    Specimen:  Blood Updated:  08/04/17 1114     RANDY Direct Negative    Narrative:       Performed at:  [address] [place] [address]  [id number]  : Adria Howe PhD, Phone:  [phone]    Scan Slide [[id number]] Collected:  08/04/17 1146    Specimen:  Blood Updated:  08/04/17 1154    CBC & Differential [[id number]] Collected:  08/04/17 1146    Specimen:  Blood Updated:  08/04/17 1159    Narrative:       The following orders were created for panel order CBC & Differential.  Procedure                               Abnormality         Status                     ---------                               -----------         ------                     Scan Slide[[id number]]                                       In process                 CBC Auto Differential[[id number]]        Abnormal            Final result                 Please view results for these tests on the  individual orders.    CBC Auto Differential [[id number]]  (Abnormal) Collected:  08/04/17 1146    Specimen:  Blood Updated:  08/04/17 1159     WBC 5.67 10*3/mm3      RBC 3.45 (L) 10*6/mm3      Hemoglobin 10.3 (L) g/dL      Hematocrit 29.8 (L) %      MCV 86.4 fL      MCH 29.9 pg      MCHC 34.6 g/dL      RDW 14.9 (H) %      RDW-SD 46.7 (H) fl      MPV 10.5 fL      Platelets 48 (L) 10*3/mm3      Neutrophil % 92.5 (H) %      Lymphocyte % 5.1 (L) %      Monocyte % 1.1 %      Eosinophil % 0.2 %      Basophil % 0.2 %      Immature Grans % 0.9 (H) %      Neutrophils, Absolute 5.25 10*3/mm3      Lymphocytes, Absolute 0.29 (L) 10*3/mm3      Monocytes, Absolute 0.06 10*3/mm3      Eosinophils, Absolute 0.01 10*3/mm3      Basophils, Absolute 0.01 10*3/mm3      Immature Grans, Absolute 0.05 (H) 10*3/mm3     Anti-Smooth Muscle Antibody Titer [[id number]] Collected:  08/02/17 2004    Specimen:  Blood Updated:  08/04/17 1212     Smooth Muscle Ab 13 Units                        Negative                     0 - 19                   Weak positive               20 - 30                   Moderate to strong positive     >30   Actin Antibodies are found in 52-85% of patients with   autoimmune hepatitis or chronic active hepatitis and   in 22% of patients with primary biliary cirrhosis.       Narrative:       Performed at:  [address] [place] [address]  [id number]  : Adria Howe PhD, Phone:  [phone]    Ceruloplasmin [[id number]]  (Abnormal) Collected:  08/02/17 2004    Specimen:  Blood Updated:  08/04/17 1212     Ceruloplasmin 13.0 (L) mg/dL     Narrative:       Performed at:  [address] [place] [address]  [id number]  : Adria Howe PhD, Phone:  [phone]    AFP Tumor Marker [[id number]]  (Abnormal) Collected:  08/02/17 2004    Specimen:  Blood Updated:  08/04/17 1212     AFP Tumor Marker 8.8 (H) ng/mL       Roche ECLIA methodology       Narrative:       Performed  at:  [address] [facility] [id number] [place], [place]  [id number]  : Adria Howe PhD, Phone:  [phone]          Culture Data:   No results found for: BLOODCX  Urine Culture   Date Value Ref Range Status   08/02/2017 Mixed Culture  Final     No results found for: RESPCX  No results found for: WOUNDCX  No results found for: STOOLCX  No components found for: BODYFLD    Radiology Data:   Imaging Results (last 24 hours)     Procedure Component Value Units Date/Time    US Abdominal Vascular Limited [[id number]] Collected:  08/03/17 1508     Updated:  08/04/17 0809    Narrative:       DATE OF EXAM: 8/3/2017 3:07 PM CDT    PROCEDURE: US ABDOMEN    INDICATION FOR PROCEDURE: 37 years old patient presents for  evaluation of alcoholic cirrhosis with ascites.  ICD 10 codes:   K70.31.    TECHNIQUE: Multiple static grayscale images are obtained  transabdominally.    COMPARISON:  No comparable studies are available for comparison  at the time of dictation.    FINDINGS: Images of the liver reveal heterogeneous echogenicity  consistent with history of cirrhosis..  There are no dilated  intrahepatic biliary ducts. Color Doppler documents hepatopedal  blood flow within the portal vein. Peak systolic velocity within  the main portal vein is 27.2 cm/s.    The gallbladder is distended..  No gallstones are visible.  There  is no pericholecystic fluid or gallbladder wall thickening.   Common bile duct measures 3.7 mm  in greatest transverse  dimension.    The right kidney has a normal sonographic appearance without  evidence for perinephric fluid or hydronephrosis. The right  kidney measures 5.5 x 6.0 cm in transverse dimension. Right  kidney was not identified in its entirety..    The pancreas is obscured by bowel gas..    The left kidney has a grossly normal appearance without evidence  for perinephric fluid, mass or hydronephrosis. Left kidney  measures  10.1 x 5.3 x 5.2 cm.    The spleen has a grossly normal  appearance..    Abdominal aorta is partially visualized and has a normal tapered  appearance..    Inferior vena cava is obscured..    Moderate amount of ascites is visualized.      Impression:       CONCLUSION:      1.  Liver is echogenic suggesting cirrhosis.   2.  Large amount of ascites.   3.  Nonvisualization of pancreas and incomplete visualization of  the right kidney.   4.  Patent portal vein.    Electronically signed by:  Beth Polanco MD  8/3/2017 4:26 PM CDT  Workstation: [employer]    US Abdomen Complete [[id number]] Collected:  08/03/17 1507     Updated:  08/04/17 0809    Narrative:       DATE OF EXAM: 8/3/2017 3:07 PM CDT    PROCEDURE: US ABDOMEN    INDICATION FOR PROCEDURE: 37 years old patient presents for  evaluation of alcoholic cirrhosis with ascites.  ICD 10 codes:   K70.31.    TECHNIQUE: Multiple static grayscale images are obtained  transabdominally.    COMPARISON:  No comparable studies are available for comparison  at the time of dictation.    FINDINGS: Images of the liver reveal heterogeneous echogenicity  consistent with history of cirrhosis..  There are no dilated  intrahepatic biliary ducts. Color Doppler documents hepatopedal  blood flow within the portal vein. Peak systolic velocity within  the main portal vein is 27.2 cm/s.    The gallbladder is distended..  No gallstones are visible.  There  is no pericholecystic fluid or gallbladder wall thickening.   Common bile duct measures 3.7 mm  in greatest transverse  dimension.    The right kidney has a normal sonographic appearance without  evidence for perinephric fluid or hydronephrosis. The right  kidney measures 5.5 x 6.0 cm in transverse dimension. Right  kidney was not identified in its entirety..    The pancreas is obscured by bowel gas..    The left kidney has a grossly normal appearance without evidence  for perinephric fluid, mass or hydronephrosis. Left kidney  measures  10.1 x 5.3 x 5.2 cm.    The spleen has a grossly normal  appearance..    Abdominal aorta is partially visualized and has a normal tapered  appearance..    Inferior vena cava is obscured..    Moderate amount of ascites is visualized.      Impression:       CONCLUSION:      1.  Liver is echogenic suggesting cirrhosis.   2.  Large amount of ascites.   3.  Nonvisualization of pancreas and incomplete visualization of  the right kidney.   4.  Patent portal vein.    Electronically signed by:  Beth Polanco MD  8/3/2017 4:26 PM CDT  Workstation: [employer]          I have reviewed the patient current medications.     Assessment/Plan   Principal Problem:    Alcoholic cirrhosis of liver with ascites  Active Problems:    Chronic midline low back pain    Chronic pelvic pain in female    Ascites due to alcoholic cirrhosis    Moderate episode of recurrent major depressive disorder  Anemia of chronic disease   Hypokaliemia   Thrombocytopenia due to liver disease    Sinus tachycardia   Hyponatremia       -We'll continue with IV hydration.  -We'll get ultrasound-guided paracentesis done this morning.got drained .  -We'll get nephrology evaluation for hyponatremia. Is  on hypertonic saline fluid restiction and  Lasix . sodium is better today   Started on sandostatin and midodrine   Will transfuse 2 units of PRBC .will monitor H&H  Hb is stable today     Will add ciwa protocol to see if  It helps with tachycardia   -DVT and GI prophylaxis in place.  -We'll resume patient's home medication as prior to coming to the hospital.  -We'll continue monitoring patient hospital setting and treat patient as course dictates  Poor pg overall   Lobo Gu MD   08/04/17   12:20 PM         Electronically signed by Lobo Gu MD at 8/4/2017 12:24 PM      Roc Barnes MD at 8/5/2017 10:46 AM  Version 1 of 1         [facility]  [address] [place] [address]. [address]  T - [phone]  F - [phone]     Progress Note          PATIENT  DEMOGRAPHICS   PATIENT NAME:  "[facility]                      PHYSICIAN: Roc Barnes MD  : 1980  MRN: [id number]   LOS: 3 days    Patient Care Team:  ERASMO Alvarez as PCP - General (Family Medicine)  Subjective   SUBJECTIVE   Back pain no soa         Objective   OBJECTIVE   Vital Signs  Temp:  [98.8 °F (37.1 °C)-98.9 °F (37.2 °C)] 98.8 °F (37.1 °C)  Heart Rate:  [123-139] 133  BP: ()/(60-78) 108/76    Flowsheet Rows         First Filed Value    Admission Height  65\" (165.1 cm) Documented at 2017    Admission Weight  120 lb (54.4 kg) Documented at 2017           I/O last 3 completed shifts:  In: 1229.3 [P.O.:325; I.V.:904.3]  Out: 2600 [Urine:2600]    PHYSICAL EXAM    Physical Exam   Constitutional: She is oriented to person, place, and time. She appears well-developed.   HENT:   Head: Normocephalic.   Eyes: Pupils are equal, round, and reactive to light.   Cardiovascular: Normal rate, regular rhythm and normal heart sounds.    Pulmonary/Chest: Effort normal and breath sounds normal.   Abdominal: Soft. Bowel sounds are normal. She exhibits distension.   Neurological: She is alert and oriented to person, place, and time.       RESULTS   Results Review:      Results from last 7 days  Lab Units 17  0641 17  0518 17  1855  17  0401   SODIUM mmol/L 129* 127* 126*  < > 125*   POTASSIUM mmol/L 3.2* 3.0* 3.7  --  3.1*   CHLORIDE mmol/L 94* 92*  --   --  88*   CO2 mmol/L 30.0 30.0  --   --  30.0   BUN mg/dL 11 10  --   --  14   CREATININE mg/dL 0.43* 0.44*  --   --  0.53   CALCIUM mg/dL 6.7* 7.0*  --   --  6.6*   BILIRUBIN mg/dL 1.6* 1.4*  --   --  1.1   ALK PHOS U/L 81 78  --   --  77   ALT (SGPT) U/L 63* 63*  --   --  68*   AST (SGOT) U/L 59* 72*  --   --  93*   GLUCOSE mg/dL 66 73  --   --  70   < > = values in this interval not displayed.    Estimated Creatinine Clearance: 133.3 mL/min (by C-G formula based on Cr of 0.43).      Results from last 7 days  Lab Units 17  0641 " 08/04/17  0518 08/03/17  0401   MAGNESIUM mg/dL 1.4* 1.4* 1.8         Results from last 7 days  Lab Units 08/02/17  1115   URIC ACID mg/dL 6.8         Results from last 7 days  Lab Units 08/05/17  0641 08/05/17  0018 08/04/17  1801 08/04/17  1146 08/04/17  0518   WBC 10*3/mm3 6.11 9.52 6.85 5.67 5.16   HEMOGLOBIN g/dL 9.5* 9.6* 10.2* 10.3* 9.6*   PLATELETS 10*3/mm3 34* 35* 46* 48* 40*         Results from last 7 days  Lab Units 08/01/17  2336   INR  0.92         Imaging Results (last 24 hours)     ** No results found for the last 24 hours. **           MEDICATIONS      cyclobenzaprine 10 mg Oral BID   docusate sodium 100 mg Oral BID   famotidine 20 mg Oral BID   furosemide 20 mg Intravenous TID   midodrine 2.5 mg Oral TID AC   nicotine 1 patch Transdermal Q24H   octreotide 50 mcg Intravenous TID   pantoprazole 40 mg Oral QAM   propranolol 20 mg Oral Q8H   venlafaxine 37.5 mg Oral QAM       sodium chloride 30 mL/hr Last Rate: 30 mL/hr (08/05/17 0805)       Assessment/Plan   ASSESSMENT / PLAN    Principal Problem:    Alcoholic cirrhosis of liver with ascites  Active Problems:    Chronic midline low back pain    Chronic pelvic pain in female    Ascites due to alcoholic cirrhosis    Moderate episode of recurrent major depressive disorder    1.hyponatremia.  This is likely secondary to hypervolemic state in the setting of chronic liver disease.  Also she is on spironolactone which is recently started and that can drop the sodium.  She is also on mirtazapine and effexor which can cause SIADH.     Urine na is low suggesting heparo renal / volume depletion. Didn't respond with 0.9 and therefore 3% now. 3% now 30 cc/hr. Check na later.  tsh and uric acid normal . keep Lasix 20 mg IV twice a day. Tolvaptan is contraindicated because of liver cirrhosis.      2.alcoholic liver cirrhosis with marked ascites recent paracentesis about a month ago.  Patient has now abdominal distention with left lower chest pain.  s/p paracentesis  had 10 L removed. Keep sandostatin midodrine and albumin. Keep lasix. Will need aldactone once na improved     3.chronic back pain/depression add ultram po and iv morphine. Avoid nsaids due to risk of variceal bleeding    4. Anemia s/p  2 U PRBC    5. Hypokalemia replace today replace mg    6. Hypocalcemia with low albumin - vitamin d deficiency start replacement              This document has been electronically signed by Roc Barnes MD on August 5, 2017 10:46 AM            Electronically signed by Roc Barnes MD at 8/5/2017 10:50 AM      Lobo Gu MD at 8/5/2017 12:44 PM  Version 1 of 1             [facility] Medicine Services  INPATIENT PROGRESS NOTE    Length of Stay: 3  Date of Admission: 8/1/2017  Primary Care Physician: ERASMO Alvarez    Subjective   Chief Complaint:   Chief Complaint   Patient presents with   • Abdominal Pain       HPI:  HPI      Review of Systems   Constitutional: Negative for activity change, appetite change, chills, diaphoresis, fatigue, fever and unexpected weight change.   HENT: Negative.  Negative for congestion, dental problem, drooling, ear discharge, ear pain, facial swelling, hearing loss, mouth sores, nosebleeds, postnasal drip, rhinorrhea, sinus pressure, sneezing, tinnitus, trouble swallowing and voice change.    Eyes: Negative for photophobia and discharge.   Respiratory: Negative for apnea, cough, choking, shortness of breath, wheezing and stridor.    Cardiovascular: Negative for chest pain, palpitations and leg swelling.   Gastrointestinal: Positive for abdominal distention. Negative for abdominal pain, anal bleeding, blood in stool, constipation, diarrhea, nausea, rectal pain and vomiting.   Endocrine: Negative for cold intolerance, heat intolerance, polydipsia, polyphagia and polyuria.   Genitourinary: Negative for dysuria, enuresis, frequency, hematuria and urgency.   Musculoskeletal: Positive for back pain. Negative  for arthralgias, joint swelling, myalgias, neck pain and neck stiffness.   Skin: Negative for color change, pallor, rash and wound.   Allergic/Immunologic: Negative for food allergies and immunocompromised state.   Neurological: Positive for weakness. Negative for dizziness, tremors, seizures, syncope, facial asymmetry, speech difficulty, light-headedness, numbness and headaches.   Hematological: Negative for adenopathy.   Psychiatric/Behavioral: Negative for agitation, behavioral problems, confusion, hallucinations, sleep disturbance and suicidal ideas. The patient is not nervous/anxious.         All pertinent negatives and positives are as above. All other systems have been reviewed and are negative unless otherwise stated.     Objective    Temp:  [97.2 °F (36.2 °C)-98.9 °F (37.2 °C)] 97.2 °F (36.2 °C)  Heart Rate:  [110-139] 110  Resp:  [16-20] 16  BP: ()/(60-82) 93/68  Physical Exam   Constitutional: She is oriented to person, place, and time.   Cachectic    Eyes: EOM are normal. Pupils are equal, round, and reactive to light.   Neck: Normal range of motion. Neck supple.   Cardiovascular: Normal rate, regular rhythm, normal heart sounds and intact distal pulses.  Exam reveals no gallop and no friction rub.    No murmur heard.  Pulmonary/Chest: Effort normal and breath sounds normal. No respiratory distress. She has no wheezes. She has no rales. She exhibits no tenderness.   Abdominal: Soft. Bowel sounds are normal. She exhibits distension.   Ascites    Musculoskeletal: She exhibits edema.   Lower ext edema bilat    Neurological: She is alert and oriented to person, place, and time.   Skin: Skin is warm and dry.   Psychiatric: She has a normal mood and affect. Her behavior is normal. Judgment and thought content normal.           Results Review:  I have reviewed the labs, radiology results, and diagnostic studies.    Laboratory Data:   Lab Results (last 24 hours)     Procedure Component Value Units  Date/Time    Vitamin D 25 Hydroxy [[id number]]  (Abnormal) Collected:  08/04/17 0518    Specimen:  Blood Updated:  08/04/17 1659     25 Hydroxy, Vitamin D <12.8 (L) ng/ml     CBC Auto Differential [[id number]]  (Abnormal) Collected:  08/04/17 1801    Specimen:  Blood Updated:  08/04/17 1812     WBC 6.85 10*3/mm3      RBC 3.45 (L) 10*6/mm3      Hemoglobin 10.2 (L) g/dL      Hematocrit 30.0 (L) %      MCV 87.0 fL      MCH 29.6 pg      MCHC 34.0 g/dL      RDW 15.0 (H) %      RDW-SD 47.0 (H) fl      MPV 10.9 fL      Platelets 46 (L) 10*3/mm3     CBC & Differential [[id number]] Collected:  08/04/17 1801    Specimen:  Blood Updated:  08/04/17 1915    Narrative:       The following orders were created for panel order CBC & Differential.  Procedure                               Abnormality         Status                     ---------                               -----------         ------                     Manual Differential[[id number]]                                                         Scan Slide[[id number]]                                       Final result               CBC Auto Differential[[id number]]        Abnormal            Final result                 Please view results for these tests on the individual orders.    Scan Slide [[id number]] Collected:  08/04/17 1801    Specimen:  Blood Updated:  08/04/17 1915     Anisocytosis Slight/1+     Hypochromia Slight/1+     Target Cells Mod/2+     WBC Morphology Normal     Platelet Estimate Decreased     Clumped Platelets --      NONE SEEN       CBC Auto Differential [[id number]]  (Abnormal) Collected:  08/05/17 0018    Specimen:  Blood Updated:  08/05/17 0059     WBC 9.52 10*3/mm3      RBC 3.28 (L) 10*6/mm3      Hemoglobin 9.6 (L) g/dL      Hematocrit 28.7 (L) %      MCV 87.5 fL      MCH 29.3 pg      MCHC 33.4 g/dL      RDW 15.0 (H) %      RDW-SD 47.5 (H) fl      MPV 12.1 (H) fL      Platelets 35 (L) 10*3/mm3      Neutrophil % 87.9 (H) %      Lymphocyte % 5.6 (L) %       Monocyte % 4.6 %      Eosinophil % 0.5 %      Basophil % 0.3 %      Immature Grans % 1.1 (H) %      Neutrophils, Absolute 8.37 10*3/mm3      Lymphocytes, Absolute 0.53 (L) 10*3/mm3      Monocytes, Absolute 0.44 10*3/mm3      Eosinophils, Absolute 0.05 10*3/mm3      Basophils, Absolute 0.03 10*3/mm3      Immature Grans, Absolute 0.10 (H) 10*3/mm3      nRBC 0.0 /100 WBC     CBC & Differential [[id number]] Collected:  08/05/17 0018    Specimen:  Blood Updated:  08/05/17 0253    Narrative:       The following orders were created for panel order CBC & Differential.  Procedure                               Abnormality         Status                     ---------                               -----------         ------                     Scan Slide[[id number]]                                       Final result               CBC Auto Differential[[id number]]        Abnormal            Final result                 Please view results for these tests on the individual orders.    Scan Slide [[id number]] Collected:  08/05/17 0018    Specimen:  Blood Updated:  08/05/17 0253     Anisocytosis Slight/1+     Toxic Granulation Slight/1+     Vacuolated Neutrophils Slight/1+     Platelet Estimate Decreased    Scan Slide [[id number]] Collected:  08/05/17 0641    Specimen:  Blood Updated:  08/05/17 0648    CBC & Differential [[id number]] Collected:  08/05/17 0641    Specimen:  Blood Updated:  08/05/17 0653    Narrative:       The following orders were created for panel order CBC & Differential.  Procedure                               Abnormality         Status                     ---------                               -----------         ------                     Scan Slide[[id number]]                                       In process                 CBC Auto Differential[[id number]]        Abnormal            Final result                 Please view results for these tests on the individual orders.    CBC Auto Differential [[id number]]   (Abnormal) Collected:  08/05/17 0641    Specimen:  Blood Updated:  08/05/17 0653     WBC 6.11 10*3/mm3      RBC 3.18 (L) 10*6/mm3      Hemoglobin 9.5 (L) g/dL      Hematocrit 27.7 (L) %      MCV 87.1 fL      MCH 29.9 pg      MCHC 34.3 g/dL      RDW 14.9 (H) %      RDW-SD 46.8 (H) fl      MPV 11.7 fL      Platelets 34 (L) 10*3/mm3      Neutrophil % 87.4 (H) %      Lymphocyte % 6.4 (L) %      Monocyte % 5.1 %      Eosinophil % 0.2 %      Basophil % 0.2 %      Immature Grans % 0.7 (H) %      Neutrophils, Absolute 5.35 10*3/mm3      Lymphocytes, Absolute 0.39 (L) 10*3/mm3      Monocytes, Absolute 0.31 10*3/mm3      Eosinophils, Absolute 0.01 10*3/mm3      Basophils, Absolute 0.01 10*3/mm3      Immature Grans, Absolute 0.04 (H) 10*3/mm3     Comprehensive Metabolic Panel [[id number]]  (Abnormal) Collected:  08/05/17 0641    Specimen:  Blood Updated:  08/05/17 0726     Glucose 66 mg/dL      BUN 11 mg/dL      Creatinine 0.43 (L) mg/dL      Sodium 129 (L) mmol/L      Potassium 3.2 (L) mmol/L      Chloride 94 (L) mmol/L      CO2 30.0 mmol/L      Calcium 6.7 (L) mg/dL      Total Protein 4.2 (L) g/dL      Albumin 1.90 (L) g/dL      ALT (SGPT) 63 (H) U/L      AST (SGOT) 59 (H) U/L      Alkaline Phosphatase 81 U/L      Total Bilirubin 1.6 (H) mg/dL      eGFR   Amer 199 (H) mL/min/1.73      Globulin 2.3 gm/dL      A/G Ratio 0.8 (L) g/dL      BUN/Creatinine Ratio 25.6 (H)     Anion Gap 5.0 mmol/L     Magnesium [[id number]]  (Abnormal) Collected:  08/05/17 0641    Specimen:  Blood Updated:  08/05/17 0726     Magnesium 1.4 (L) mg/dL     Bilirubin, Direct [[id number]]  (Normal) Collected:  08/05/17 0641    Specimen:  Blood Updated:  08/05/17 0726     Bilirubin, Direct 0.3 mg/dL     CBC & Differential [[id number]] Collected:  08/05/17 1201    Specimen:  Blood Updated:  08/05/17 1223    Narrative:       The following orders were created for panel order CBC & Differential.  Procedure                               Abnormality          Status                     ---------                               -----------         ------                     CBC Auto Differential[[id number]]                            In process                   Please view results for these tests on the individual orders.    CBC Auto Differential [[id number]] Collected:  08/05/17 1201    Specimen:  Blood Updated:  08/05/17 1223    Extra Tubes [[id number]] Collected:  08/05/17 1201    Specimen:  Blood from Blood, Venous Line Updated:  08/05/17 1226    Narrative:       The following orders were created for panel order Extra Tubes.  Procedure                               Abnormality         Status                     ---------                               -----------         ------                     Green Top (Gel)[[id number]]                                  In process                   Please view results for these tests on the individual orders.    Green Top (Gel) [[id number]] Collected:  08/05/17 1201    Specimen:  Blood Updated:  08/05/17 1226          Culture Data:   No results found for: BLOODCX  Urine Culture   Date Value Ref Range Status   08/02/2017 Mixed Culture  Final     No results found for: RESPCX  No results found for: WOUNDCX  No results found for: STOOLCX  No components found for: BODYFLD    Radiology Data:   Imaging Results (last 24 hours)     ** No results found for the last 24 hours. **          I have reviewed the patient current medications.     Assessment/Plan     Principal Problem:    Alcoholic cirrhosis of liver with ascites  Active Problems:    Chronic midline low back pain    Chronic pelvic pain in female    Ascites due to alcoholic cirrhosis    Moderate episode of recurrent major depressive disorder  Anemia of chronic disease   Hypokaliemia   Thrombocytopenia due to liver disease    Sinus tachycardia   Hyponatremia       -We'll continue with IV hydration.  -We'll get ultrasound-guided paracentesis done this morning.got drained .10 liters drained  "  -We'll get nephrology evaluation for hyponatremia. Is  on hypertonic saline fluid restiction and  Lasix . sodium is better today   Started on sandostatin and midodrine   Will transfuse 2 units of PRBC .will monitor H&H  Hb is stable today    hb still stable      Will add ciwa protocol to see if  It helps with tachycardia    will add propranolol  Order cta rule out PE   -DVT and GI prophylaxis in place.  -We'll resume patient's home medication as prior to coming to the hospital.  -We'll continue monitoring patient hospital setting and treat patient as course dictates  Poor pg overall     Lobo Gu MD   17   12:44 PM         Electronically signed by Lobo Gu MD at 2017 12:47 PM      Roc Barnes MD at 2017 11:51 AM  Version 1 of 1         [facility]  [address] [place] [address]. [address]  T - [phone]  F - [phone]     Progress Note          PATIENT  DEMOGRAPHICS   PATIENT NAME: Joseline Parmjit                      PHYSICIAN: Roc Barnes MD  : 1980  MRN: [id number]   LOS: 4 days    Patient Care Team:  ERASMO Alvarez as PCP - General (Family Medicine)  Subjective   SUBJECTIVE     Confused concerned for DTs. S/p parham       Objective   OBJECTIVE   Vital Signs  Temp:  [97.2 °F (36.2 °C)-98.2 °F (36.8 °C)] 98.2 °F (36.8 °C)  Heart Rate:  [] 102  Resp:  [16-22] 18  BP: ()/() 90/64    Flowsheet Rows         First Filed Value    Admission Height  65\" (165.1 cm) Documented at 2017    Admission Weight  120 lb (54.4 kg) Documented at 2017           I/O last 3 completed shifts:  In: 1593.3 [P.O.:200; I.V.:1143.3; IV Piggyback:250]  Out: 3425 [Urine:1925; Stool:1500]    PHYSICAL EXAM    Physical Exam   Constitutional: She is oriented to person, place, and time. She appears well-developed.   HENT:   Head: Normocephalic.   Eyes: Pupils are equal, round, and reactive to light.   Cardiovascular: " Normal rate, regular rhythm and normal heart sounds.    Pulmonary/Chest: Effort normal and breath sounds normal.   Abdominal: Soft. Bowel sounds are normal. She exhibits distension.   Neurological: She is alert and oriented to person, place, and time.       RESULTS   Results Review:      Results from last 7 days  Lab Units 08/06/17  0535 08/05/17  2140 08/05/17  1540 08/05/17  0641 08/04/17  0518   SODIUM mmol/L 134* 129* 127* 129* 127*   POTASSIUM mmol/L 3.5 3.5  --  3.2* 3.0*   CHLORIDE mmol/L 100 96  --  94* 92*   CO2 mmol/L 27.0 29.0  --  30.0 30.0   BUN mg/dL 11 11  --  11 10   CREATININE mg/dL 0.41* 0.41*  --  0.43* 0.44*   CALCIUM mg/dL 6.9* 6.9*  --  6.7* 7.0*   BILIRUBIN mg/dL 1.5*  --   --  1.6* 1.4*   ALK PHOS U/L 75  --   --  81 78   ALT (SGPT) U/L 51  --   --  63* 63*   AST (SGOT) U/L 33  --   --  59* 72*   GLUCOSE mg/dL 98 106*  --  66 73       Estimated Creatinine Clearance: 144.2 mL/min (by C-G formula based on Cr of 0.41).      Results from last 7 days  Lab Units 08/06/17  0535 08/05/17  0641 08/04/17  0518   MAGNESIUM mg/dL 1.5* 1.4* 1.4*         Results from last 7 days  Lab Units 08/02/17  1115   URIC ACID mg/dL 6.8         Results from last 7 days  Lab Units 08/06/17  0535 08/05/17  2141 08/05/17  1846 08/05/17  1201 08/05/17  0641   WBC 10*3/mm3 5.94 6.29 5.89 5.87 6.11   HEMOGLOBIN g/dL 9.2* 9.0* 8.9* 9.6* 9.5*   PLATELETS 10*3/mm3 32* 29* 35* 41* 34*         Results from last 7 days  Lab Units 08/01/17  2336   INR  0.92         Imaging Results (last 24 hours)     ** No results found for the last 24 hours. **           MEDICATIONS      cyclobenzaprine 10 mg Oral BID   docusate sodium 100 mg Oral BID   famotidine 20 mg Oral BID   furosemide 20 mg Intravenous TID   magnesium sulfate 1 g IVPB 1 g Intravenous BID   midodrine 2.5 mg Oral TID AC   nicotine 1 patch Transdermal Q24H   octreotide 50 mcg Intravenous TID   pantoprazole 40 mg Oral QAM   propranolol 20 mg Oral Q8H   venlafaxine 37.5 mg  Oral QAM       norepinephrine 0.02-0.3 mcg/kg/min    sodium chloride 35 mL/hr Last Rate: Stopped (08/06/17 0633)       Assessment/Plan   ASSESSMENT / PLAN    Principal Problem:    Alcoholic cirrhosis of liver with ascites  Active Problems:    Chronic midline low back pain    Chronic pelvic pain in female    Ascites due to alcoholic cirrhosis    Moderate episode of recurrent major depressive disorder    1.hyponatremia.  This is likely secondary to hypervolemic state in the setting of chronic liver disease.  Also she is on spironolactone which is recently started and that can drop the sodium.  She is also on mirtazapine and effexor which can cause SIADH.     Urine na is low suggesting heparo renal / volume depletion. Didn't respond with 0.9 and therefore 3% now. Na upto 134 and we have stopped 3% may need it again. Lower lasix to bid. tsh and uric acid normal . keep Lasix 20 mg IV twice a day. Tolvaptan is contraindicated because of liver cirrhosis.   effexor lowered    2.alcoholic liver cirrhosis with marked ascites recent paracentesis about a month ago.  Patient has now abdominal distention with left lower chest pain.  s/p paracentesis had 10 L removed. Keep sandostatin midodrine and albumin. Keep lasix. Will need aldactone once na improved. Increase midodrine to 10mg tid     3.chronic back pain/depression onultram po and iv morphine. Avoid nsaids due to risk of variceal bleeding    4. Anemia s/p  2 U PRBC    5. Hypokalemia replace mg, k better    6. Hypocalcemia with low albumin - vitamin d deficiency start replacement              This document has been electronically signed by Roc Barnes MD on August 6, 2017 11:51 AM            Electronically signed by Roc Barnes MD at 8/6/2017 11:53 AM      Chandrakant You DO at 8/6/2017  1:14 PM  Version 1 of 1          Chandrakant You DO,[facility]  Gastroenterology  Hepatology  Endoscopy  Board Certified in Internal Medicine and gastroenterology  [address] [facility]  [place]. [address]  - [phone]   F - [phone]     GASTROENTEROLOGY PROGRESS NOTE   TAM BALTAZARMargie AYON DO.         SUBJECTIVE:   8/6/2017  Chief Complaint:     Subjective  : Confusion and currently being treated for active delirium tremors.  The tachycardia seems to be less.  No abdominal pain that is present when pressed on the abdomen.     CURRENT MEDICATIONS/OBJECTIVE/VS/PE:     Current Medications:     Current Facility-Administered Medications   Medication Dose Route Frequency Provider Last Rate Last Dose   • acetaminophen (TYLENOL) tablet 650 mg  650 mg Oral Q4H PRN Mo Garcia MD       • bisacodyl (DULCOLAX) suppository 10 mg  10 mg Rectal Daily PRN Mo Garcia MD       • cyclobenzaprine (FLEXERIL) tablet 10 mg  10 mg Oral BID Mo Garcia MD   10 mg at 08/06/17 0901   • dextrose (D50W) solution 50 mL  50 mL Intravenous Q1H PRN Diogo iDckerson MD   50 mL at 08/02/17 0034   • docusate sodium (COLACE) capsule 100 mg  100 mg Oral BID Mo Garcia MD   100 mg at 08/06/17 0901   • famotidine (PEPCID) tablet 20 mg  20 mg Oral BID Mo Garcia MD   20 mg at 08/06/17 0851   • furosemide (LASIX) injection 20 mg  20 mg Intravenous Q12H Roc Barnes MD       • hydrALAZINE (APRESOLINE) injection 10 mg  10 mg Intravenous Q6H PRN Mo Garcia MD       • ibuprofen (ADVIL,MOTRIN) tablet 600 mg  600 mg Oral Q6H PRN Lobo Gu MD       • LORazepam (ATIVAN) tablet 0.5 mg  0.5 mg Oral Q2H PRN Lobo Gu MD        Or   • LORazepam (ATIVAN) injection 0.5 mg  0.5 mg Intravenous Q2H PRN Lobo Gu MD        Or   • LORazepam (ATIVAN) tablet 1 mg  1 mg Oral Q1H PRN Lobo Gu MD        Or   • LORazepam (ATIVAN) injection 1 mg  1 mg Intravenous Q1H PRN Lobo Gu MD        Or   • LORazepam (ATIVAN) injection 1 mg  1 mg Intravenous Q15 Min PRN Lobo Gu MD        Or   • LORazepam (ATIVAN) injection 1 mg  1 mg  Intramuscular Q15 Min PRN Lobo Gu MD        Or   • LORazepam (ATIVAN) tablet 2 mg  2 mg Oral Q1H PRN Lobo Gu MD        Or   • LORazepam (ATIVAN) injection 2 mg  2 mg Intravenous Q1H PRN Lobo uG MD   2 mg at 08/06/17 0844   • LORazepam (ATIVAN) injection 1 mg  1 mg Intravenous Q6H PRN Mo Garcia MD       • magnesium hydroxide (MILK OF MAGNESIA) suspension 2400 mg/10mL 10 mL  10 mL Oral Daily PRN Mo Garcia MD       • magnesium sulfate 1 g in sodium chloride 0.9 % 50 mL IVPB  1 g Intravenous BID Lobo Gu  mL/hr at 08/06/17 0902 1 g at 08/06/17 0902   • midodrine (PROAMATINE) tablet 10 mg  10 mg Oral TID AC Roc Barnes MD       • Morphine sulfate (PF) injection 1 mg  1 mg Intravenous Q4H PRN Roc Barnes MD   1 mg at 08/05/17 1834   • nicotine (NICODERM CQ) 21 MG/24HR patch 1 patch  1 patch Transdermal Q24H Mo Gacria MD   1 patch at 08/06/17 0855   • norepinephrine (LEVOPHED) 8,000 mcg in sodium chloride 0.9 % 250 mL (32 mcg/mL) infusion  0.02-0.3 mcg/kg/min Intravenous Titrated Lobo Gu MD       • octreotide (sandoSTATIN) injection 50 mcg  50 mcg Intravenous TID Roc Barnes MD   50 mcg at 08/06/17 0851   • ondansetron (ZOFRAN) tablet 4 mg  4 mg Oral Q6H PRN Mo Garcia MD        Or   • ondansetron ODT (ZOFRAN-ODT) disintegrating tablet 4 mg  4 mg Oral Q6H PRN Mo Garcia MD        Or   • ondansetron (ZOFRAN) injection 4 mg  4 mg Intravenous Q6H PRN Mo Garcia MD   4 mg at 08/05/17 1532   • pantoprazole (PROTONIX) EC tablet 40 mg  40 mg Oral QAM Mo Garcia MD   40 mg at 08/06/17 0617   • potassium chloride (MICRO-K) CR capsule 40 mEq  40 mEq Oral PRN Mo Garcia MD   40 mEq at 08/05/17 1755    Or   • potassium chloride (KLOR-CON) packet 40 mEq  40 mEq Oral PRN Mo Garcia MD        Or   • potassium chloride 10 mEq in 100 mL IVPB  10 mEq Intravenous Q1H  PRN Mo Garcia  mL/hr at 08/03/17 1445 10 mEq at 08/03/17 1445   • potassium chloride 10 mEq in 100 mL IVPB  10 mEq Intravenous Q1H PRN Lobo Gu MD       • propranolol (INDERAL) tablet 20 mg  20 mg Oral Q8H Lobo Gu MD   20 mg at 08/06/17 0617   • sodium chloride 0.9 % flush 1-10 mL  1-10 mL Intravenous PRN Mo Garcia MD   10 mL at 08/05/17 1000   • sodium chloride 0.9 % flush 10 mL  10 mL Intravenous PRN Diogo Dickerson MD       • traMADol (ULTRAM) tablet 50 mg  50 mg Oral Q8H PRN Roc Barnes MD   50 mg at 08/05/17 1117   • venlafaxine (EFFEXOR) tablet 37.5 mg  37.5 mg Oral QAM Roc Barnes MD   37.5 mg at 08/06/17 0617   • zolpidem (AMBIEN) tablet 5 mg  5 mg Oral Nightly PRN Roc Barnes MD   5 mg at 08/05/17 2158       Objective     Physical Exam:   Temp:  [97.6 °F (36.4 °C)-98.2 °F (36.8 °C)] 98.2 °F (36.8 °C)  Heart Rate:  [] 102  Resp:  [18-22] 18  BP: ()/() 90/64     Physical Exam:  General Appearance:    Alert, cooperative, in no acute distress   Head:    Normocephalic, without obvious abnormality, atraumatic   Eyes:            Lids and lashes normal, conjunctivae and sclerae normal, no   icterus, no pallor, corneas clear, PERRLA   Ears:    Ears appear intact with no abnormalities noted   Throat:   No oral lesions, no thrush, oral mucosa moist   Neck:   No adenopathy, supple, trachea midline, no thyromegaly, no     carotid bruit, no JVD   Back:     No kyphosis present, no scoliosis present, no skin lesions,       erythema or scars, no tenderness to percussion or                   palpation,   range of motion normal   Lungs:     Clear to auscultation,respirations regular, even and                   unlabored    Heart:    Regular rhythm and normal rate, normal S1 and S2, no            murmur, no gallop, no rub, no click   Breast Exam:    Deferred   Abdomen:     Normal bowel sounds, no masses, no organomegaly, soft        non-tender,Moderate  ascites, no guarding, no rebound                 tenderness   Genitalia:    Deferred   Extremities:   Moves all extremities well, no edema, no cyanosis, no              redness   Pulses:   Pulses palpable and equal bilaterally   Skin:   No bleeding, bruising or rash   Lymph nodes:   No palpable adenopathy   Neurologic:   Cranial nerves 2 - 12 grossly intact, sensation intact, DTR        present and equal bilaterally      Results Review:     Lab Results (last 24 hours)     Procedure Component Value Units Date/Time    Sodium [[id number]]  (Abnormal) Collected:  08/05/17 1540    Specimen:  Blood Updated:  08/05/17 1557     Sodium 127 (L) mmol/L     CBC Auto Differential [[id number]]  (Abnormal) Collected:  08/05/17 1846    Specimen:  Blood Updated:  08/05/17 1852     WBC 5.89 10*3/mm3      RBC 2.96 (L) 10*6/mm3      Hemoglobin 8.9 (L) g/dL      Hematocrit 26.0 (L) %      MCV 87.8 fL      MCH 30.1 pg      MCHC 34.2 g/dL      RDW 15.0 (H) %      RDW-SD 47.3 (H) fl      MPV 11.7 fL      Platelets 35 (L) 10*3/mm3      Neutrophil % 90.2 (H) %      Lymphocyte % 6.3 (L) %      Monocyte % 2.0 %      Eosinophil % 0.3 %      Basophil % 0.2 %      Immature Grans % 1.0 (H) %      Neutrophils, Absolute 5.31 10*3/mm3      Lymphocytes, Absolute 0.37 (L) 10*3/mm3      Monocytes, Absolute 0.12 10*3/mm3      Eosinophils, Absolute 0.02 10*3/mm3      Basophils, Absolute 0.01 10*3/mm3      Immature Grans, Absolute 0.06 (H) 10*3/mm3     CBC & Differential [[id number]] Collected:  08/05/17 1846    Specimen:  Blood Updated:  08/05/17 1937    Narrative:       The following orders were created for panel order CBC & Differential.  Procedure                               Abnormality         Status                     ---------                               -----------         ------                     Scan Slide[[id number]]                                       Final result               CBC Auto Differential[[id number]]        Abnormal             Final result                 Please view results for these tests on the individual orders.    Scan Slide [[id number]] Collected:  08/05/17 1846    Specimen:  Blood Updated:  08/05/17 1937     Anisocytosis Slight/1+     Hypochromia Slight/1+     Target Cells Mod/2+     Toxic Granulation Mod/2+     Vacuolated Neutrophils Slight/1+     Platelet Estimate Decreased     Clumped Platelets --      NONE SEEN       CBC Auto Differential [[id number]]  (Abnormal) Collected:  08/05/17 2141    Specimen:  Blood Updated:  08/05/17 2147     WBC 6.29 10*3/mm3      RBC 2.95 (L) 10*6/mm3      Hemoglobin 9.0 (L) g/dL      Hematocrit 25.5 (L) %      MCV 86.4 fL      MCH 30.5 pg      MCHC 35.3 g/dL      RDW 15.3 (H) %      RDW-SD 47.8 (H) fl      MPV 11.1 fL      Platelets 29 (L) 10*3/mm3      Neutrophil % 88.7 (H) %      Lymphocyte % 6.2 (L) %      Monocyte % 4.6 %      Eosinophil % 0.0 %      Basophil % 0.2 %      Immature Grans % 0.3 %      Neutrophils, Absolute 5.58 10*3/mm3      Lymphocytes, Absolute 0.39 (L) 10*3/mm3      Monocytes, Absolute 0.29 10*3/mm3      Eosinophils, Absolute 0.00 10*3/mm3      Basophils, Absolute 0.01 10*3/mm3      Immature Grans, Absolute 0.02 10*3/mm3     Basic Metabolic Panel [[id number]]  (Abnormal) Collected:  08/05/17 2140    Specimen:  Blood Updated:  08/05/17 2156     Glucose 106 (H) mg/dL      BUN 11 mg/dL      Creatinine 0.41 (L) mg/dL      Sodium 129 (L) mmol/L      Potassium 3.5 mmol/L      Chloride 96 mmol/L      CO2 29.0 mmol/L      Calcium 6.9 (L) mg/dL      eGFR  African Amer 210 (H) mL/min/1.73      BUN/Creatinine Ratio 26.8 (H)     Anion Gap 4.0 (L) mmol/L     CBC & Differential [[id number]] Collected:  08/05/17 2141    Specimen:  Blood Updated:  08/05/17 2213    Narrative:       The following orders were created for panel order CBC & Differential.  Procedure                               Abnormality         Status                     ---------                               -----------          ------                     Scan Slide[[id number]]                                       Final result               CBC Auto Differential[[id number]]        Abnormal            Final result                 Please view results for these tests on the individual orders.    Scan Slide [[id number]] Collected:  08/05/17 2141    Specimen:  Blood Updated:  08/05/17 2213     Anisocytosis Mod/2+     Hypochromia Mod/2+     Dohle Bodies Present     Toxic Granulation Mod/2+     Vacuolated Neutrophils Mod/2+     Platelet Estimate Decreased    CBC Auto Differential [[id number]]  (Abnormal) Collected:  08/06/17 0535    Specimen:  Blood Updated:  08/06/17 0546     WBC 5.94 10*3/mm3      RBC 3.00 (L) 10*6/mm3      Hemoglobin 9.2 (L) g/dL      Hematocrit 26.4 (L) %      MCV 88.0 fL      MCH 30.7 pg      MCHC 34.8 g/dL      RDW 15.2 (H) %      RDW-SD 48.4 (H) fl      MPV 12.3 (H) fL      Platelets 32 (L) 10*3/mm3      Neutrophil % 90.5 (H) %      Lymphocyte % 5.6 (L) %      Monocyte % 3.0 %      Eosinophil % 0.3 %      Basophil % 0.3 %      Immature Grans % 0.3 %      Neutrophils, Absolute 5.37 10*3/mm3      Lymphocytes, Absolute 0.33 (L) 10*3/mm3      Monocytes, Absolute 0.18 10*3/mm3      Eosinophils, Absolute 0.02 10*3/mm3      Basophils, Absolute 0.02 10*3/mm3      Immature Grans, Absolute 0.02 10*3/mm3     CBC & Differential [[id number]] Collected:  08/06/17 0535    Specimen:  Blood Updated:  08/06/17 0547    Narrative:       The following orders were created for panel order CBC & Differential.  Procedure                               Abnormality         Status                     ---------                               -----------         ------                     Scan Slide[[id number]]                                                                  CBC Auto Differential[[id number]]        Abnormal            Final result                 Please view results for these tests on the individual orders.    Comprehensive Metabolic Panel  [[id number]]  (Abnormal) Collected:  08/06/17 0535    Specimen:  Blood Updated:  08/06/17 0603     Glucose 98 mg/dL      BUN 11 mg/dL      Creatinine 0.41 (L) mg/dL      Sodium 134 (L) mmol/L      Potassium 3.5 mmol/L      Chloride 100 mmol/L      CO2 27.0 mmol/L      Calcium 6.9 (L) mg/dL      Total Protein 4.1 (L) g/dL      Albumin 1.90 (L) g/dL      ALT (SGPT) 51 U/L      AST (SGOT) 33 U/L      Alkaline Phosphatase 75 U/L      Total Bilirubin 1.5 (H) mg/dL      eGFR  African Amer 210 (H) mL/min/1.73      Globulin 2.2 (L) gm/dL      A/G Ratio 0.9 (L) g/dL      BUN/Creatinine Ratio 26.8 (H)     Anion Gap 7.0 mmol/L     Magnesium [[id number]]  (Abnormal) Collected:  08/06/17 0535    Specimen:  Blood Updated:  08/06/17 0603     Magnesium 1.5 (L) mg/dL     Bilirubin, Direct [[id number]]  (Normal) Collected:  08/06/17 0535    Specimen:  Blood Updated:  08/06/17 0603     Bilirubin, Direct 0.2 mg/dL          I reviewed the patient's new clinical results.  I reviewed the patient's new imaging results and agree with the interpretation.     ASSESSMENT/PLAN:   ASSESSMENT:   1.  Cirrhosis secondary to alcohol  2.  Ascites secondary to cirrhosis.  No evidence of any portal vein thrombosis  3.  Anemia secondary to chronic disease as well as alcohol-induced bone marrow suppression as well as blood loss  4.  Tachycardia, improving, may be related to the patient's delirium tremors  5.  Active delirium tremors    PLAN:   1.  Supportive care is in place  2.  Repeat paracentesis as needed      Chandrakant You DO  08/06/17  1:14 PM        Electronically signed by Chandrakant You DO at 8/6/2017  1:17 PM      Lobo Gu MD at 8/6/2017  6:01 PM  Version 1 of 1         Patient has a 30 % pneumothorax on same side of central line      Traumatic pneumothorax Dr Webb called  No indication for chest tube at this point   Family informed   Will order chest xray I n am if desturates then will insert chest tube      Electronically  "signed by Lobo Gu MD at 2017  6:03 PM      Roc Barnes MD at 2017  8:51 AM  Version 1 of 1         [facility]  [address] [place] [address]. [address]  T - [phone]  F - [phone]     Progress Note          PATIENT  DEMOGRAPHICS   PATIENT NAME: Joseline Parnell                      PHYSICIAN: Roc Barnes MD  : 1980  MRN: [id number]   LOS: 5 days    Patient Care Team:  ERASMO Alvarez as PCP - General (Family Medicine)  Subjective   SUBJECTIVE     More awake today still has abdo pain and watery diarrhea       Objective   OBJECTIVE   Vital Signs  Temp:  [98 °F (36.7 °C)-98.3 °F (36.8 °C)] 98.3 °F (36.8 °C)  Heart Rate:  [] 96  Resp:  [16-22] 22  BP: ()/(53-78) 93/57    Flowsheet Rows       First Filed Value    Admission Height  65\" (165.1 cm) Documented at 2017 0046    Admission Weight  120 lb (54.4 kg) Documented at 2017 0046         I/O last 3 completed shifts:  In: 807 [P.O.:420; I.V.:387]  Out: 3550 [Urine:2050; Stool:1500]    PHYSICAL EXAM    Physical Exam   Constitutional: She is oriented to person, place, and time. She appears well-developed.   HENT:   Head: Normocephalic.   Eyes: Pupils are equal, round, and reactive to light.   Cardiovascular: Normal rate, regular rhythm and normal heart sounds.    Pulmonary/Chest: Effort normal and breath sounds normal.   Abdominal: Soft. Bowel sounds are normal. She exhibits distension.   Neurological: She is alert and oriented to person, place, and time.       RESULTS   Results Review:      Results from last 7 days  Lab Units 17  0526 17  0535 17  2140  17  0641   SODIUM mmol/L 131* 134* 129*  < > 129*   POTASSIUM mmol/L 3.0* 3.5 3.5  --  3.2*   CHLORIDE mmol/L 96 100 96  --  94*   CO2 mmol/L 29.0 27.0 29.0  --  30.0   BUN mg/dL 14 11 11  --  11   CREATININE mg/dL 0.45* 0.41* 0.41*  --  0.43*   CALCIUM mg/dL 7.1* 6.9* 6.9*  --  6.7*   BILIRUBIN mg/dL 2.2* 1.5* "  --   --  1.6*   ALK PHOS U/L 79 75  --   --  81   ALT (SGPT) U/L 57* 51  --   --  63*   AST (SGOT) U/L 30 33  --   --  59*   GLUCOSE mg/dL 64 98 106*  --  66   < > = values in this interval not displayed.    Estimated Creatinine Clearance: 111.9 mL/min (by C-G formula based on Cr of 0.45).      Results from last 7 days  Lab Units 08/07/17  0526 08/06/17  0535 08/05/17  0641   MAGNESIUM mg/dL 1.7 1.5* 1.4*         Results from last 7 days  Lab Units 08/02/17  1115   URIC ACID mg/dL 6.8         Results from last 7 days  Lab Units 08/07/17  0526 08/06/17  1831 08/06/17  1332 08/06/17  0535 08/05/17  2141   WBC 10*3/mm3 5.77 5.67 5.15 5.94 6.29   HEMOGLOBIN g/dL 9.2* 8.5* 8.8* 9.2* 9.0*   PLATELETS 10*3/mm3 42* 33* 33* 32* 29*         Results from last 7 days  Lab Units 08/01/17  2336   INR  0.92         Imaging Results (last 24 hours)   Procedure Component Value Units Date/Time    CT Angiogram Chest With Contrast [[id number]] Collected:  08/06/17 1556     Updated:  08/06/17 1639    Narrative:       Patient Name:  KAIDEN ALONSO  Patient ID:  [id number]   Ordering:  DANA RIZZO  Attending:  GENESIS NGUYEN  Referring:  DANA RIZZO  ------------------------------------------------    CT angiogram of the chest with contrast    History: Shortness of breath    Axial spiral scans of the chest were obtained  with  intravenous  contrast.  Coronal and sagittal reconstructions and both oblique  coronal MIPS performed at the same workstation.    This exam was performed according to our departmental  dose-optimization program, which includes automated exposure  control, adjustment of the mA and/or kV according to patient size  and/or use of iterative reconstruction technique.    DLP: 169.10    Comparison: None    No pulmonary embolism.  No mediastinal hematoma.  No hilar, mediastinal or axillary adenopathy.  No thoracic aortic aneurysm or dissection.  No pericardial effusion.    Right internal jugular catheter in  place with tip in the distal  superior vena cava.  Chronic obstructive pulmonary disease.  Approximately 30% right pneumothorax.  Increased interstitial markings in each lung which may be chronic  or may represent interstitial pneumonia or interstitial edema.  Discoid and subsegmental atelectasis in each lower lobe.  No mass or noncalcified nodule.  No pleural effusion.    Large amount of ascites.  Focal 1.2 cm filling defect right anterior lateral aspect  proximal abdominal aorta which may represent a mass or focal  thrombus.    No acute osseous abnormality.      Impression:       Conclusion:  No pulmonary embolism.  Right internal jugular catheter in place with tip in the distal  superior vena cava.  Chronic obstructive pulmonary disease.  Approximately 30% right pneumothorax.  Increased interstitial markings in each lung which may be chronic  or may represent interstitial pneumonia or interstitial edema.  Discoid and subsegmental atelectasis in each lower lobe.  Large amount of ascites.  Focal 1.2 cm filling defect right anterior lateral aspect  proximal abdominal aorta which may represent a mass or focal  thrombus.    Report called at approximately 4:45 PM CST.    [id number]    Electronically signed by:  Harshad Polanco MD  8/6/2017 4:38 PM CDT  Workstation: [facility] Chest 1 View [[id number]] Collected:  08/07/17 0415     Updated:  08/07/17 0437    Narrative:         Chest single view on  8/7/2017     CLINICAL INDICATION: Follow-up right-sided pneumothorax    COMPARISON: Chest CT from 8/6/2016 and chest x-ray from 8/2/2017    FINDINGS: There is a stable small to moderate-sized right  pneumothorax. There is approximately 1.7 cm of pleural separation  superiorly with 2.3 cm of pleural separation laterally. Even  though this is stable this appears large enough to consider chest  tube placement. Recommend at least close clinical follow-up and  short-term follow-up imaging. There is mild bibasilar  atelectasis.  Right IJ catheter tip is in the SVC. Heart is within  normal limits for size.      Impression:       Stable small to moderate size right pneumothorax that  is large enough for chest tube placement but stability may mean  this may not require chest tube placement. Recommend at least  close clinical follow-up and short-term follow-up imaging.    Electronically signed by:  Ge Haney  8/7/2017 4:36 AM CDT  Workstation: [id number]         MEDICATIONS      cyclobenzaprine 10 mg Oral BID   docusate sodium 100 mg Oral BID   famotidine 20 mg Oral BID   furosemide 20 mg Intravenous Q12H   magnesium sulfate 1 g IVPB 1 g Intravenous BID   midodrine 10 mg Oral TID AC   nicotine 1 patch Transdermal Q24H   octreotide 50 mcg Intravenous TID   pantoprazole 40 mg Oral QAM   propranolol 20 mg Oral Q8H   venlafaxine 37.5 mg Oral QAM       norepinephrine 0.02-0.3 mcg/kg/min       Assessment/Plan   ASSESSMENT / PLAN    Principal Problem:    Alcoholic cirrhosis of liver with ascites  Active Problems:    Chronic midline low back pain    Chronic pelvic pain in female    Ascites due to alcoholic cirrhosis    Moderate episode of recurrent major depressive disorder    1.hyponatremia.  This is likely secondary to hypervolemic state in the setting of chronic liver disease.  Also she is on spironolactone which is recently started and that can drop the sodium.  She is also on mirtazapine and effexor which can cause SIADH.     Urine na is low suggesting heparo renal / volume depletion. Didn't respond with 0.9 and therefore 3% now. Na again drop after stopping 3% saline. Start salt tab . Wean from effexor. Keep lasix. Replace k. tsh and uric acid normal . Tolvaptan is contraindicated because of liver cirrhosis.     2.alcoholic liver cirrhosis with marked ascites recent paracentesis about a month ago.  Patient has now abdominal distention with left lower chest pain.  s/p paracentesis had 10 L removed. Keep sandostatin midodrine and  albumin. Keep lasix. Will need aldactone once na improved.     3.chronic back pain/depression onultram po and iv morphine. Avoid nsaids due to risk of variceal bleeding    4. Anemia s/p  2 U PRBC    5. Hypokalemia mg better, replace k    6. Hypocalcemia with low albumin - vitamin d deficiency start replacement              This document has been electronically signed by Roc Barnes MD on August 7, 2017 8:51 AM            Electronically signed by Roc Barnes MD at 8/7/2017  9:03 AM        Consult Notes (last 72 hours) (Notes from 8/4/2017 10:30 AM through 8/7/2017 10:30 AM)     No notes of this type exist for this encounter.

## 2017-08-07 NOTE — CONSULTS
Adult Nutrition  Assessment    Patient Name:  Joseline Parnell  YOB: 1980  MRN: [id number]  Admit Date:  8/1/2017    Assessment Date:  8/7/2017          Reason for Assessment       08/07/17 1345    Reason for Assessment    Reason For Assessment/Visit follow up protocol                Nutrition/Diet History       08/07/17 1345    Nutrition/Diet History    Typical Food/Fluid Intake Pt sleeping soundly.  Her nsg asked that I not wake her.  She has been confused and not eating much.  She did drink some of the shake earlier.                Labs/Tests/Procedures/Meds       08/07/17 1346    Labs/Tests/Procedures/Meds    Labs/Tests Review Reviewed;Na+;K+;Creat;Alb    Medication Review Reviewed, pertinent;Diuretic                Nutrition Prescription Ordered       08/07/17 1347    Nutrition Prescription PO    Current PO Diet Regular    Supplement Mighty Shake    Supplement Frequency 2 times a day    Common Modifiers Cardiac            Evaluation of Received Nutrient/Fluid Intake       08/07/17 1347    PO Evaluation    Number of Days PO Intake Evaluated Insufficient Data    Number of Meals 3    % PO Intake 0--25%            Comments:      Pt continues to have suboptimal po intake of meals with confusion noted. She is currently being treated for delirium tremors related to alcohol use. she is eating only 0-25% of meals and taking some of the supplements.  WT down with Paracentesis and pt on Lasix.   Rd will monitor.                Electronically signed by:  Akosua Motley RD  08/07/17 1:53 PM

## 2017-08-07 NOTE — PLAN OF CARE
Problem: Patient Care Overview (Adult)  Goal: Plan of Care Review  Outcome: Ongoing (interventions implemented as appropriate)    08/07/17 8484   Coping/Psychosocial Response Interventions   Plan Of Care Reviewed With patient   Patient Care Overview   Progress no change   Outcome Evaluation   Outcome Summary/Follow up Plan Pt with stable VS throughout shift, BP borderline low however midodrine seems to be effective. Still confused, oriented to person only. With very poor appetite, very drowsy today.          Problem: Liver Failure, Acute/Chronic (Adult)  Goal: Signs and Symptoms of Listed Potential Problems Will be Absent or Manageable (Liver Failure, Acute/Chronic)  Outcome: Ongoing (interventions implemented as appropriate)    Problem: Pressure Ulcer Risk (Sean Scale) (Adult,Obstetrics,Pediatric)  Goal: Skin Integrity  Outcome: Ongoing (interventions implemented as appropriate)    Problem: Pain, Acute (Adult)  Goal: Identify Related Risk Factors and Signs and Symptoms  Outcome: Outcome(s) achieved Date Met:  08/07/17  Goal: Acceptable Pain Control/Comfort Level  Outcome: Ongoing (interventions implemented as appropriate)

## 2017-08-08 NOTE — PROGRESS NOTES
"[facility]  [address] [place] [address]. [id number]  T - [phone]  F - [phone]     Progress Note          PATIENT  DEMOGRAPHICS   PATIENT NAME: Joseline Parnell                      PHYSICIAN: Roc Barnes MD  : 1980  MRN: [id number]   LOS: 6 days    Patient Care Team:  ERASMO Alvarez as PCP - General (Family Medicine)  Subjective   SUBJECTIVE     Confused drowsy       Objective   OBJECTIVE   Vital Signs  Temp:  [97.7 °F (36.5 °C)-98.8 °F (37.1 °C)] 98.1 °F (36.7 °C)  Heart Rate:  [] 103  Resp:  [12-21] 16  BP: ()/(52-85) 95/65    Flowsheet Rows         First Filed Value    Admission Height  65\" (165.1 cm) Documented at 2017 0046    Admission Weight  120 lb (54.4 kg) Documented at 2017 0046           I/O last 3 completed shifts:  In: 1268 [P.O.:668; IV Piggyback:600]  Out: 2725 [Urine:2725]    PHYSICAL EXAM    Physical Exam   Constitutional: She is oriented to person, place, and time. She appears well-developed.   HENT:   Head: Normocephalic.   Eyes: Pupils are equal, round, and reactive to light.   Cardiovascular: Normal rate, regular rhythm and normal heart sounds.    Pulmonary/Chest: Effort normal and breath sounds normal.   Abdominal: Soft. Bowel sounds are normal. She exhibits distension.   Neurological: She is alert and oriented to person, place, and time.       RESULTS   Results Review:      Results from last 7 days  Lab Units 17  0435 17  1607 17  0526 17  0535   SODIUM mmol/L 132*  --  131* 134*   POTASSIUM mmol/L 3.5 4.0 3.0* 3.5   CHLORIDE mmol/L 96  --  96 100   CO2 mmol/L 30.0  --  29.0 27.0   BUN mg/dL 18  --  14 11   CREATININE mg/dL 0.51  --  0.45* 0.41*   CALCIUM mg/dL 7.6*  --  7.1* 6.9*   BILIRUBIN mg/dL 1.2  --  2.2* 1.5*   ALK PHOS U/L 87  --  79 75   ALT (SGPT) U/L 57*  --  57* 51   AST (SGOT) U/L 34  --  30 33   GLUCOSE mg/dL 116*  --  64 98       Estimated Creatinine Clearance: 98.7 mL/min (by " C-G formula based on Cr of 0.51).      Results from last 7 days  Lab Units 08/08/17  0435 08/07/17  0526 08/06/17  0535   MAGNESIUM mg/dL 1.6 1.7 1.5*         Results from last 7 days  Lab Units 08/02/17  1115   URIC ACID mg/dL 6.8         Results from last 7 days  Lab Units 08/08/17  0435 08/08/17  0001 08/07/17  1607 08/07/17  1220 08/07/17  0526   WBC 10*3/mm3 5.98 6.07 6.55 6.32 5.77   HEMOGLOBIN g/dL 9.0* 9.0* 10.2* 8.9* 9.2*   PLATELETS 10*3/mm3 31* 34* 35* 31* 42*         Results from last 7 days  Lab Units 08/01/17  2336   INR  0.92         Imaging Results (last 24 hours)     Procedure Component Value Units Date/Time    XR Chest 1 View [[id number]] Collected:  08/07/17 1823     Updated:  08/07/17 1835    Narrative:         EXAM:         Radiograph(s), Chest   VIEWS:   Portable ; 1       DATE/TIME: 8/7/2017 6:32 PM CDT               INDICATION:     Pneumothorax follow-up      COMPARISON:   CXR: 8/7/17 at 04:14 hours          FINDINGS:           - lines/tubes:     Right approach central venous catheter,  unchanged.     - cardiac:          size within normal limits         - mediastinum:  contour within normal limits         - lungs:          no focal air space process, pulmonary  interstitial edema, nodule(s)/mass             - pleura:          Previously described right-sided pneumothorax  has decreased in size with a small residual pneumothorax  remaining inferiorly/laterally on the right.                  - osseous:          unremarkable for age                  - misc.:        Impression:       CONCLUSION:        1. Interval decrease in size of the right pneumothorax.    Maintain short interval radiographic follow-up to document  resolution.                      Electronically signed by:  SHAR Melendrez MD  8/7/2017 6:34 PM  CDT Workstation: [id number]    XR Chest 1 View [[id number]] Collected:  08/08/17 0816     Updated:  08/08/17 0837    Narrative:       Chest single view.     CLINICAL INDICATION:  Shortness of breath. Right-sided  pneumothorax, follow-up.    COMPARISON: Chest x-ray August 7, 2017.    FINDINGS: Cardiac silhouette is normal in size. Pulmonary  vascularity is unremarkable.     Small right-sided pneumothorax less than 5%.     0.9 cm of space between the pleural reflection and the chest wall  at the right lung base. Similar size to prior examination August 7, 2017.   Clearing of discoid infiltrative changes left lung base. Partial  clearing of discoid infiltrative, atelectatic changes right lung  base.      Impression:       CONCLUSION: Small right-sided pneumothorax unchanged since prior  examination. Clearing of discoid atelectatic changes at left lung  base. Partial clearing of discoid atelectatic changes right lung  base.    Electronically signed by:  Nickolas Gonzales MD  8/8/2017 8:36 AM CDT  Workstation: [id number]           MEDICATIONS      cyclobenzaprine 10 mg Oral BID   docusate sodium 100 mg Oral BID   famotidine 20 mg Oral BID   furosemide 20 mg Intravenous Q12H   midodrine 10 mg Oral TID AC   nicotine 1 patch Transdermal Q24H   octreotide 50 mcg Intravenous TID   pantoprazole 40 mg Oral QAM   propranolol 20 mg Oral Q8H   sodium chloride 2 g Oral TID With Meals   venlafaxine 25 mg Oral QAM   Vitamin D3 50,000 Units Oral Once per day on Mon Thu       norepinephrine 0.02-0.3 mcg/kg/min       Assessment/Plan   ASSESSMENT / PLAN    Principal Problem:    Alcoholic cirrhosis of liver with ascites  Active Problems:    Chronic midline low back pain    Chronic pelvic pain in female    Ascites due to alcoholic cirrhosis    Moderate episode of recurrent major depressive disorder    Hypokalemia    Traumatic pneumothorax    Hyponatremia    1.hyponatremia.  This is likely secondary to hypervolemic state in the setting of chronic liver disease.  Also she is on spironolactone which is recently started and that can drop the sodium.  She is also on mirtazapine and effexor which can cause SIADH.     Urine na is  low suggesting heparo renal / volume depletion. Didn't respond with 0.9 and therefore 3% now. Na again drop after stopping 3% saline. On salt tab now and sodium is better. Keep salt tab. Taper effexor. Keep lasix. tsh and uric acid normal . Tolvaptan is contraindicated because of liver cirrhosis.     2.alcoholic liver cirrhosis with marked ascites recent paracentesis about a month ago.  Patient has now abdominal distention with left lower chest pain.  s/p paracentesis had 10 L removed. Keep midodrine. Keep lasix. Add aldactone     3.chronic back pain/depression onultram po and iv morphine. Avoid nsaids due to risk of variceal bleeding    4. Anemia s/p  2 U PRBC    5. Hypokalemia mg better, k stable    6. Hypocalcemia with low albumin - vitamin d deficiency start replacement              This document has been electronically signed by Roc Barnes MD on August 8, 2017 8:49 AM

## 2017-08-08 NOTE — PROGRESS NOTES
MEDICINE DAILY PROGRESS NOTE  NAME: Joseline Parnell  : 1980  MRN: [id number]     LOS: 6 days     PROVIDER OF SERVICE: Luis F Jalloh MD    Chief Complaint: Alcoholic cirrhosis of liver with ascites    Subjective:     Interval History:  History taken from: patient chart RN   Oriented x 3 this morning.  Some lethargy present today.  Patient again cathectic appearing.  Hasn't touched breakfast tray.      Review of Systems:   Review of Systems   Constitutional: Positive for activity change and fatigue. Negative for appetite change and fever.   HENT: Negative for ear pain and sore throat.    Eyes: Negative for pain and visual disturbance.   Respiratory: Negative for cough and shortness of breath.    Cardiovascular: Negative for chest pain and palpitations.   Gastrointestinal: Positive for abdominal distention. Negative for abdominal pain and nausea.   Endocrine: Negative for cold intolerance and heat intolerance.   Genitourinary: Negative for difficulty urinating and dysuria.   Musculoskeletal: Negative for arthralgias and gait problem.   Skin: Negative for color change and rash.   Neurological: Positive for weakness. Negative for dizziness and headaches.   Hematological: Negative for adenopathy. Does not bruise/bleed easily.   Psychiatric/Behavioral: Positive for confusion. Negative for agitation and sleep disturbance.       Objective:     Vital Signs  Vitals:    17 0602 17 0705 17 0805 17 0900   BP: 97/68 95/65 98/71 101/74   BP Location: Right arm      Patient Position: Lying      Pulse: 101 103 104 109   Resp: 17 16 24    Temp:   97.6 °F (36.4 °C)    TempSrc:   Oral    SpO2: 95%  100%    Weight:       Height:           Physical Exam  Physical Exam   Constitutional: She is oriented to person, place, and time. She appears well-developed. She appears cachectic. No distress.   HENT:   Head: Normocephalic and atraumatic.   Right Ear: External ear normal.   Left Ear: External ear normal.      Nose: Nose normal.   Eyes: Pupils are equal, round, and reactive to light.   Neck: Normal range of motion. Neck supple.   Cardiovascular: Normal rate, regular rhythm and normal heart sounds.  Exam reveals no gallop and no friction rub.    No murmur heard.  Pulmonary/Chest: Effort normal and breath sounds normal. No respiratory distress. She has no wheezes. She has no rales. She exhibits no tenderness.   Abdominal: Soft. Bowel sounds are normal. She exhibits distension. She exhibits no mass. There is no tenderness. There is no rebound and no guarding.   Neurological: She is alert and oriented to person, place, and time.   Skin: She is not diaphoretic.       Medication Review    Current Facility-Administered Medications:   •  acetaminophen (TYLENOL) tablet 650 mg, 650 mg, Oral, Q4H PRN, Mo Garcia MD  •  bisacodyl (DULCOLAX) suppository 10 mg, 10 mg, Rectal, Daily PRN, Mo Garcia MD  •  cyclobenzaprine (FLEXERIL) tablet 10 mg, 10 mg, Oral, BID, Mo Garcia MD, 10 mg at 08/08/17 0915  •  dextrose (D50W) solution 50 mL, 50 mL, Intravenous, Q1H PRN, Diogo Dickerson MD, 50 mL at 08/02/17 0034  •  docusate sodium (COLACE) capsule 100 mg, 100 mg, Oral, BID, Mo Garcia MD, 100 mg at 08/06/17 1813  •  famotidine (PEPCID) tablet 20 mg, 20 mg, Oral, BID, Mo Garcia MD, 20 mg at 08/08/17 0917  •  furosemide (LASIX) injection 20 mg, 20 mg, Intravenous, Q12H, Roc Barnes MD, 20 mg at 08/08/17 0916  •  hydrALAZINE (APRESOLINE) injection 10 mg, 10 mg, Intravenous, Q6H PRN, Mo Garcia MD  •  ibuprofen (ADVIL,MOTRIN) tablet 600 mg, 600 mg, Oral, Q6H PRN, Lobo Gu MD  •  loperamide (IMODIUM) capsule 2 mg, 2 mg, Oral, TID PRN, Roc Barnes MD, 2 mg at 08/08/17 0915  •  LORazepam (ATIVAN) tablet 0.5 mg, 0.5 mg, Oral, Q2H PRN **OR** LORazepam (ATIVAN) injection 0.5 mg, 0.5 mg, Intravenous, Q2H PRN **OR** LORazepam (ATIVAN) tablet 1 mg, 1 mg, Oral, Q1H  PRN **OR** LORazepam (ATIVAN) injection 1 mg, 1 mg, Intravenous, Q1H PRN **OR** LORazepam (ATIVAN) injection 1 mg, 1 mg, Intravenous, Q15 Min PRN **OR** LORazepam (ATIVAN) injection 1 mg, 1 mg, Intramuscular, Q15 Min PRN **OR** LORazepam (ATIVAN) tablet 2 mg, 2 mg, Oral, Q1H PRN **OR** LORazepam (ATIVAN) injection 2 mg, 2 mg, Intravenous, Q1H PRN, Lobo Gu MD, 2 mg at 08/06/17 0844  •  LORazepam (ATIVAN) injection 1 mg, 1 mg, Intravenous, Q6H PRN, Mo Garcia MD  •  midodrine (PROAMATINE) tablet 10 mg, 10 mg, Oral, TID AC, Roc Barnes MD, 10 mg at 08/08/17 0630  •  Morphine sulfate (PF) injection 1 mg, 1 mg, Intravenous, Q4H PRN, Roc Barnes MD, 1 mg at 08/06/17 2103  •  nicotine (NICODERM CQ) 21 MG/24HR patch 1 patch, 1 patch, Transdermal, Q24H, Mo Garcia MD, 1 patch at 08/07/17 0838  •  norepinephrine (LEVOPHED) 8,000 mcg in sodium chloride 0.9 % 250 mL (32 mcg/mL) infusion, 0.02-0.3 mcg/kg/min, Intravenous, Titrated, Lobo Gu MD  •  ondansetron (ZOFRAN) tablet 4 mg, 4 mg, Oral, Q6H PRN **OR** ondansetron ODT (ZOFRAN-ODT) disintegrating tablet 4 mg, 4 mg, Oral, Q6H PRN **OR** ondansetron (ZOFRAN) injection 4 mg, 4 mg, Intravenous, Q6H PRN, Mo Garcia MD, 4 mg at 08/08/17 1006  •  pantoprazole (PROTONIX) EC tablet 40 mg, 40 mg, Oral, QAM, Mo Garcia MD, 40 mg at 08/08/17 0628  •  potassium chloride (MICRO-K) CR capsule 40 mEq, 40 mEq, Oral, PRN, 40 mEq at 08/05/17 1755 **OR** potassium chloride (KLOR-CON) packet 40 mEq, 40 mEq, Oral, PRN **OR** potassium chloride 10 mEq in 100 mL IVPB, 10 mEq, Intravenous, Q1H PRN, Mo Garcia MD, Last Rate: 100 mL/hr at 08/07/17 1141, 10 mEq at 08/07/17 1141  •  potassium chloride 10 mEq in 100 mL IVPB, 10 mEq, Intravenous, Q1H PRN, Lobo Gu MD, Last Rate: 100 mL/hr at 08/07/17 1039, 10 mEq at 08/07/17 1039  •  potassium chloride 10 mEq in 100 mL IVPB, 10 mEq, Intravenous, Q1H, Roc  MD Cameron, Last Rate: 100 mL/hr at 08/08/17 0921, 10 mEq at 08/08/17 0921  •  propranolol (INDERAL) tablet 20 mg, 20 mg, Oral, Q8H, Lobo Gu MD, 20 mg at 08/08/17 0628  •  sodium chloride 0.9 % flush 1-10 mL, 1-10 mL, Intravenous, PRN, Mo Garcia MD, 10 mL at 08/06/17 1824  •  Insert peripheral IV, , , Once **AND** sodium chloride 0.9 % flush 10 mL, 10 mL, Intravenous, PRN, Diogo Dickerson MD, 10 mL at 08/08/17 0917  •  sodium chloride tablet 2 g, 2 g, Oral, TID With Meals, Roc Barnes MD, 2 g at 08/08/17 0916  •  spironolactone (ALDACTONE) tablet 25 mg, 25 mg, Oral, Daily, Roc Barnes MD, 25 mg at 08/08/17 0915  •  traMADol (ULTRAM) tablet 50 mg, 50 mg, Oral, Q8H PRN, Roc Barnes MD, 50 mg at 08/07/17 2012  •  [START ON 8/9/2017] venlafaxine (EFFEXOR) tablet 12.5 mg, 12.5 mg, Oral, QAM, Roc Barnes MD  •  Vitamin D3 50,000 Units, 50,000 Units, Oral, Once per day on Mon Thu, Roc Barnes MD, 50,000 Units at 08/07/17 1040  •  zolpidem (AMBIEN) tablet 5 mg, 5 mg, Oral, Nightly PRN, Roc Barnes MD, 5 mg at 08/07/17 2133     Diagnostic Data    Lab Results (last 24 hours)     Procedure Component Value Units Date/Time    CBC & Differential [[id number]] Collected:  08/07/17 1220    Specimen:  Blood Updated:  08/07/17 1235    Narrative:       The following orders were created for panel order CBC & Differential.  Procedure                               Abnormality         Status                     ---------                               -----------         ------                     Scan Slide[[id number]]                                                                  CBC Auto Differential[[id number]]        Abnormal            Final result                 Please view results for these tests on the individual orders.    CBC Auto Differential [[id number]]  (Abnormal) Collected:  08/07/17 1220    Specimen:  Blood Updated:  08/07/17 1235     WBC 6.32 10*3/mm3      RBC 3.00 (L) 10*6/mm3       Hemoglobin 8.9 (L) g/dL      Hematocrit 26.3 (L) %      MCV 87.7 fL      MCH 29.7 pg      MCHC 33.8 g/dL      RDW 15.1 (H) %      RDW-SD 47.7 (H) fl      MPV 11.6 fL      Platelets 31 (L) 10*3/mm3      Neutrophil % 85.7 (H) %      Lymphocyte % 7.6 (L) %      Monocyte % 5.4 %      Eosinophil % 0.2 %      Basophil % 0.3 %      Immature Grans % 0.8 (H) %      Neutrophils, Absolute 5.42 10*3/mm3      Lymphocytes, Absolute 0.48 (L) 10*3/mm3      Monocytes, Absolute 0.34 10*3/mm3      Eosinophils, Absolute 0.01 10*3/mm3      Basophils, Absolute 0.02 10*3/mm3      Immature Grans, Absolute 0.05 (H) 10*3/mm3     Extra Tubes [[id number]] Collected:  08/07/17 1221    Specimen:  Blood from Blood, Venous Line Updated:  08/07/17 1401    Narrative:       The following orders were created for panel order Extra Tubes.  Procedure                               Abnormality         Status                     ---------                               -----------         ------                     Lavender Top[[id number]]                                     Final result               Green Top (Gel)[[id number]]                                  Final result                 Please view results for these tests on the individual orders.    Lavender Top [[id number]] Collected:  08/07/17 1221    Specimen:  Blood Updated:  08/07/17 1401     Extra Tube hold for add-on      Auto resulted       Green Top (Gel) [[id number]] Collected:  08/07/17 1221    Specimen:  Blood Updated:  08/07/17 1401     Extra Tube Hold for add-ons.      Auto resulted.       CBC Auto Differential [[id number]]  (Abnormal) Collected:  08/07/17 1607    Specimen:  Blood Updated:  08/07/17 1619     WBC 6.55 10*3/mm3      RBC 3.48 (L) 10*6/mm3      Hemoglobin 10.2 (L) g/dL      Hematocrit 30.7 (L) %      MCV 88.2 fL      MCH 29.3 pg      MCHC 33.2 g/dL      RDW 15.2 (H) %      RDW-SD 48.6 (H) fl      MPV 12.8 (H) fL      Platelets 35 (L) 10*3/mm3      Neutrophil % 84.6 (H) %       Lymphocyte % 9.2 (L) %      Monocyte % 4.3 %      Eosinophil % 0.6 %      Basophil % 0.5 %      Immature Grans % 0.8 (H) %      Neutrophils, Absolute 5.55 10*3/mm3      Lymphocytes, Absolute 0.60 10*3/mm3      Monocytes, Absolute 0.28 10*3/mm3      Eosinophils, Absolute 0.04 10*3/mm3      Basophils, Absolute 0.03 10*3/mm3      Immature Grans, Absolute 0.05 (H) 10*3/mm3      nRBC 0.0 /100 WBC     Potassium [[id number]]  (Normal) Collected:  08/07/17 1607    Specimen:  Blood Updated:  08/07/17 1622     Potassium 4.0 mmol/L     CBC & Differential [[id number]] Collected:  08/07/17 1607    Specimen:  Blood Updated:  08/07/17 2130    Narrative:       The following orders were created for panel order CBC & Differential.  Procedure                               Abnormality         Status                     ---------                               -----------         ------                     Scan Slide[[id number]]                                       Final result               CBC Auto Differential[[id number]]        Abnormal            Final result                 Please view results for these tests on the individual orders.    Scan Slide [[id number]] Collected:  08/07/17 1607    Specimen:  Blood Updated:  08/07/17 2130     Anisocytosis Slight/1+     Hypochromia Slight/1+     Poikilocytes Slight/1+     WBC Morphology Normal     Platelet Estimate Decreased    CBC Auto Differential [[id number]]  (Abnormal) Collected:  08/08/17 0001    Specimen:  Blood Updated:  08/08/17 0114     WBC 6.07 10*3/mm3      RBC 3.05 (L) 10*6/mm3      Hemoglobin 9.0 (L) g/dL      Hematocrit 26.6 (L) %      MCV 87.2 fL      MCH 29.5 pg      MCHC 33.8 g/dL      RDW 15.1 (H) %      RDW-SD 47.5 (H) fl      MPV 12.3 (H) fL      Platelets 34 (L) 10*3/mm3     Extra Tubes [[id number]] Collected:  08/08/17 0001    Specimen:  Blood from Blood, Venous Line Updated:  08/08/17 0202    Narrative:       The following orders were created for panel order Extra  Tubes.  Procedure                               Abnormality         Status                     ---------                               -----------         ------                     Green Top (Gel)[[id number]]                                  Final result                 Please view results for these tests on the individual orders.    Green Top (Gel) [[id number]] Collected:  08/08/17 0001    Specimen:  Blood Updated:  08/08/17 0202     Extra Tube Hold for add-ons.      Auto resulted.       CBC Auto Differential [[id number]]  (Abnormal) Collected:  08/08/17 0435    Specimen:  Blood Updated:  08/08/17 0455     WBC 5.98 10*3/mm3      RBC 3.05 (L) 10*6/mm3      Hemoglobin 9.0 (L) g/dL      Hematocrit 26.7 (L) %      MCV 87.5 fL      MCH 29.5 pg      MCHC 33.7 g/dL      RDW 15.1 (H) %      RDW-SD 48.2 (H) fl      MPV 12.4 (H) fL      Platelets 31 (L) 10*3/mm3      Neutrophil % 87.4 (H) %      Lymphocyte % 8.4 (L) %      Monocyte % 3.5 %      Eosinophil % 0.2 %      Basophil % 0.2 %      Immature Grans % 0.3 %      Neutrophils, Absolute 5.23 10*3/mm3      Lymphocytes, Absolute 0.50 (L) 10*3/mm3      Monocytes, Absolute 0.21 10*3/mm3      Eosinophils, Absolute 0.01 10*3/mm3      Basophils, Absolute 0.01 10*3/mm3      Immature Grans, Absolute 0.02 10*3/mm3     CBC & Differential [[id number]] Collected:  08/08/17 0435    Specimen:  Blood Updated:  08/08/17 0455    Narrative:       The following orders were created for panel order CBC & Differential.  Procedure                               Abnormality         Status                     ---------                               -----------         ------                     Scan Slide[[id number]]                                                                  CBC Auto Differential[[id number]]        Abnormal            Final result                 Please view results for these tests on the individual orders.    Magnesium [[id number]]  (Normal) Collected:  08/08/17 0435    Specimen:   Blood Updated:  08/08/17 0500     Magnesium 1.6 mg/dL     Bilirubin, Direct [[id number]]  (Normal) Collected:  08/08/17 0435    Specimen:  Blood Updated:  08/08/17 0500     Bilirubin, Direct 0.0 mg/dL     Comprehensive Metabolic Panel [[id number]]  (Abnormal) Collected:  08/08/17 0435    Specimen:  Blood Updated:  08/08/17 0505     Glucose 116 (H) mg/dL      BUN 18 mg/dL      Creatinine 0.51 mg/dL      Sodium 132 (L) mmol/L      Potassium 3.5 mmol/L      Chloride 96 mmol/L      CO2 30.0 mmol/L      Calcium 7.6 (L) mg/dL      Total Protein 4.3 (L) g/dL      Albumin 1.90 (L) g/dL      ALT (SGPT) 57 (H) U/L      AST (SGOT) 34 U/L      Alkaline Phosphatase 87 U/L      Total Bilirubin 1.2 mg/dL      eGFR   Amer 164 (H) mL/min/1.73      Globulin 2.4 gm/dL      A/G Ratio 0.8 (L) g/dL      BUN/Creatinine Ratio 35.3 (H)     Anion Gap 6.0 mmol/L     CBC & Differential [[id number]] Collected:  08/08/17 0001    Specimen:  Blood Updated:  08/08/17 0607    Narrative:       The following orders were created for panel order CBC & Differential.  Procedure                               Abnormality         Status                     ---------                               -----------         ------                     Manual Differential[[id number]]          Abnormal            Final result               Scan Slide[[id number]]                                                                  CBC Auto Differential[[id number]]        Abnormal            Final result                 Please view results for these tests on the individual orders.    Manual Differential [[id number]]  (Abnormal) Collected:  08/08/17 0001    Specimen:  Blood Updated:  08/08/17 0607     Neutrophil % 67.0 %      Lymphocyte % 6.0 (L) %      Monocyte % 8.0 %      Basophil % 1.0 %      Bands %  16.0 (H) %      Myelocyte % 2.0 (H) %      Neutrophils Absolute 5.04 10*3/mm3      Lymphocytes Absolute 0.36 (L) 10*3/mm3      Monocytes Absolute 0.49 10*3/mm3      Basophils  Absolute 0.06 10*3/mm3      Hypochromia Slight/1+     Target Cells Slight/1+     Toxic Granulation Slight/1+     Vacuolated Neutrophils Slight/1+     Platelet Estimate Decreased        I reviewed the patient's new clinical results.    Assessment/Plan:     Active Hospital Problems (** Indicates Principal Problem)    Diagnosis Date Noted   • **Alcoholic cirrhosis of liver with ascites [K70.31] 07/19/2017     CIWA.  GI following.  Await recommendations.     • Hypokalemia [E87.6] 08/07/2017 08/08/17.  At goal today.  Monitor.    Replace.  Recheck.  Magnesium at 1.7 mg/dL (Normal)     • Traumatic pneumothorax [S27.0XXA] 08/07/2017 08/08/17.  Improving.  Almost resolved per x-ray.  Will continue to monitor.    Imaging Results (last 24 hours)     Procedure Component Value Units Date/Time    XR Chest 1 View [[id number]] Collected:  08/07/17 1823     Updated:  08/07/17 1835    Narrative:         EXAM:         Radiograph(s), Chest   VIEWS:   Portable ; 1       DATE/TIME: 8/7/2017 6:32 PM CDT               INDICATION:     Pneumothorax follow-up      COMPARISON:   CXR: 8/7/17 at 04:14 hours          FINDINGS:           - lines/tubes:     Right approach central venous catheter,  unchanged.     - cardiac:          size within normal limits         - mediastinum:  contour within normal limits         - lungs:          no focal air space process, pulmonary  interstitial edema, nodule(s)/mass             - pleura:          Previously described right-sided pneumothorax  has decreased in size with a small residual pneumothorax  remaining inferiorly/laterally on the right.                  - osseous:          unremarkable for age                  - misc.:        Impression:       CONCLUSION:        1. Interval decrease in size of the right pneumothorax.    Maintain short interval radiographic follow-up to document  resolution.                      Electronically signed by:  SHAR Melendrez MD  8/7/2017 6:34 PM  CDT  Workstation: [id number]    XR Chest 1 View [[id number]] Collected:  08/08/17 0816     Updated:  08/08/17 0837    Narrative:       Chest single view.     CLINICAL INDICATION: Shortness of breath. Right-sided  pneumothorax, follow-up.    COMPARISON: Chest x-ray August 7, 2017.    FINDINGS: Cardiac silhouette is normal in size. Pulmonary  vascularity is unremarkable.     Small right-sided pneumothorax less than 5%.     0.9 cm of space between the pleural reflection and the chest wall  at the right lung base. Similar size to prior examination August 7, 2017.   Clearing of discoid infiltrative changes left lung base. Partial  clearing of discoid infiltrative, atelectatic changes right lung  base.      Impression:       CONCLUSION: Small right-sided pneumothorax unchanged since prior  examination. Clearing of discoid atelectatic changes at left lung  base. Partial clearing of discoid atelectatic changes right lung  base.    Electronically signed by:  Nickolas Gonzales MD  8/8/2017 8:36 AM CDT  Workstation: [id number]            Stable on x-ray.  Dr. Wang discussed case with CT surgery yesterday.  Monitoring.  Chest tube as needed with desaturation or worsening pneumothorax on x-ray.     • Hyponatremia [E87.1] 08/07/2017     Nephrology following.     • Ascites due to alcoholic cirrhosis [K70.31] 07/19/2017     GI following.  Await recommendations.     • Moderate episode of recurrent major depressive disorder [F33.1] 07/19/2017   • Chronic midline low back pain [M54.5, G89.29] 01/17/2017   • Chronic pelvic pain in female [R10.2, G89.29] 01/17/2017      Resolved Hospital Problems    Diagnosis Date Noted Date Resolved   No resolved problems to display.     Will monitor patient's hospital course and adjust treatment as hospital course dictates.    DVT prophylaxis: SCD/TEDs  Code status is Full Code    Plan for disposition:Unknown      Time:           This document has been electronically signed by Luis F Jalloh MD on August 8,  2017 10:06 AM

## 2017-08-08 NOTE — PLAN OF CARE
Problem: Patient Care Overview (Adult)  Goal: Plan of Care Review  Outcome: Ongoing (interventions implemented as appropriate)    08/08/17 0531   Coping/Psychosocial Response Interventions   Plan Of Care Reviewed With patient   Patient Care Overview   Progress no change   Outcome Evaluation   Outcome Summary/Follow up Plan Patient confused throughout the night, tachycardia, hypotension, poor appetite, asks for water, parham, bath given, pain, did not sleep well, up multiple times to bedside commode, decreased GI output, mostly gas, Imodium working         Problem: Liver Failure, Acute/Chronic (Adult)  Goal: Signs and Symptoms of Listed Potential Problems Will be Absent or Manageable (Liver Failure, Acute/Chronic)  Outcome: Ongoing (interventions implemented as appropriate)    Problem: Pressure Ulcer Risk (Sean Scale) (Adult,Obstetrics,Pediatric)  Goal: Identify Related Risk Factors and Signs and Symptoms  Outcome: Ongoing (interventions implemented as appropriate)  Goal: Skin Integrity  Outcome: Ongoing (interventions implemented as appropriate)    Problem: Pain, Acute (Adult)  Goal: Acceptable Pain Control/Comfort Level  Outcome: Ongoing (interventions implemented as appropriate)    Problem: Fall Risk (Adult)  Goal: Identify Related Risk Factors and Signs and Symptoms  Outcome: Ongoing (interventions implemented as appropriate)  Goal: Absence of Falls  Outcome: Ongoing (interventions implemented as appropriate)

## 2017-08-08 NOTE — CONSULTS
Nutrition Services    Patient Name:  Joseline Parnell  YOB: 1980  MRN: [id number]  Admit Date:  8/1/2017        Per nsg staff pt is not doing well.  She did eat a few bites and drank her milk with her pills this am but then had emesis.  SHe drank an Ensure yesterday evening.  Willl change the mighty shakes to Ensure and monitor.  Protocol for alcohol withdraws have been started.  RD will monitor.     Electronically signed by:  Akosua Motley RD  08/08/17 1:23 PM

## 2017-08-09 PROBLEM — D69.6 THROMBOCYTOPENIA (HCC): Chronic | Status: ACTIVE | Noted: 2017-01-01

## 2017-08-09 PROBLEM — E83.42 HYPOMAGNESEMIA: Status: ACTIVE | Noted: 2017-01-01

## 2017-08-09 NOTE — PROGRESS NOTES
Malnutrition Severity Assessment    Patient Name:  Joseline Parnell  YOB: 1980  MRN: [id number]  Admit Date:  8/1/2017    Patient meets criteria for : Severe malnutrition    Comments:  Pt presents with severe malnutrition as evidenced by BMI of 15.33 and 74% of her IBW.  She has a hx of alcohol abuse with minimal po intake even pta.  She has physical signs of fat & muscle loss with pt appearing cachexia.        Malnutrition Type: Social/Environmental Circumstance Malnutrition     Malnutrition Type (last 8 hours)      Malnutrition Severity Assessment       08/09/17 1328    Physical Signs of Malnutrition (Social/Environmental)    Muscle Wasting Severe    Fat Loss Severe    Fluid Accumulation Mild    Secondary Physical Signs None      08/09/17 1328    Malnutrition Severity Assessment    Malnutrition Type Social/Environmental Circumstance Malnutrition          Physical Signs of Malnutrition         Most Recent Value    Muscle Wasting  Severe    Fat Loss  Severe    Fluid Accumulation  Mild    Secondary Physical Signs  None      Weight Status         Most Recent Value    BMI  Mild (<19)    %UBW  Severe (<75%)    Weight Loss  -- [unable to assess]      Energy Intake Status         Most Recent Value    Energy Intake  Severe (< or equal to 50% / > or equal to 1 mo)              Electronically signed by:  Akosua Motley RD  08/09/17 1:33 PM

## 2017-08-09 NOTE — CONSULTS
Adult Nutrition  Assessment    Patient Name:  Joseline Parnell  YOB: 1980  MRN: [id number]  Admit Date:  8/1/2017    Assessment Date:  8/9/2017          Reason for Assessment       08/09/17 1315    Reason for Assessment    Reason For Assessment/Visit follow up protocol              Nutrition/Diet History       08/09/17 1315    Nutrition/Diet History    Typical Food/Fluid Intake Pt sleeping.  Per her nsg she was too lethargic to eat this am and yesterday she threw up everything that she ate.  MD is aware              Labs/Tests/Procedures/Meds       08/09/17 1318    Labs/Tests/Procedures/Meds    Labs/Tests Review Reviewed;Na+;K+;BUN;Alb    Medication Review Reviewed, pertinent;Diuretic;MgSO4            Physical Findings       08/09/17 1319    Physical Appearance    Overall Physical Appearance loss of subcutaneous fat;loss of muscle mass;cachetic              Nutrition Prescription Ordered       08/09/17 1320    Nutrition Prescription PO    Current PO Diet Regular    Supplement Ensure Enlive    Supplement Frequency 3 times a day    Common Modifiers Cardiac            Evaluation of Received Nutrient/Fluid Intake       08/09/17 1321    PO Evaluation    Number of Days PO Intake Evaluated Insufficient Data    % PO Intake Negligible po intake              Malnutrition Severity Assessment       08/09/17 1328    Malnutrition Severity Assessment    Malnutrition Type Social/Environmental Circumstance Malnutrition    Physical Signs of Malnutrition (Social/Environmental)    Muscle Wasting Severe    Fat Loss Severe    Fluid Accumulation Mild    Secondary Physical Signs None    Weight Status (Social/Environmental)    BMI Mild (<19)    %UBW Severe (<75%)    Weight Loss --   unable to assess    Energy Intake Status (Social/Environmental)    Energy Intake Severe (< or equal to 50% / > or equal to 1 mo)    Criteria Met (Must meet criteria for severity in at least 2 of these categories: M Wasting, Fat Loss, Fluid,  Secondary Signs, Wt. Status, Intake)    Patient meets criteria for  Severe malnutrition        Comments:  Pt appears at severe malnutrition with continues negligible po intake.  She is not tolerating po even when she is alert enough to eat.  MD is aware.  A meeting with the family has been scheduled to discuss plan of care.  Her Bilirubin is now wnl.  ABd distended.  Will recommend Nutrition support--either a SBFT and/or PN nutrition.  ADE will monitor.         Electronically signed by:  Akosua Motley RD  08/09/17 1:39 PM

## 2017-08-09 NOTE — PROGRESS NOTES
MEDICINE DAILY PROGRESS NOTE  NAME: Joseline Parnell  : 1980  MRN: [id number]     LOS: 7 days     PROVIDER OF SERVICE: Luis F Jalloh MD    Chief Complaint: Alcoholic cirrhosis of liver with ascites    Subjective:     Interval History:  History taken from: patient chart RN   Patient drowsy again this morning.  Per chart review patient much more alert during the afternoon/evening hours.  Still visibly cathectic.  Dietary to see patient again today and make recommendation.  Will have family meeting today.    Review of Systems:   Review of Systems   Constitutional: Positive for activity change and fatigue. Negative for appetite change and fever.   HENT: Negative for ear pain and sore throat.    Eyes: Negative for pain and visual disturbance.   Respiratory: Negative for cough and shortness of breath.    Cardiovascular: Negative for chest pain and palpitations.   Gastrointestinal: Positive for abdominal distention. Negative for abdominal pain and nausea.   Endocrine: Negative for cold intolerance and heat intolerance.   Genitourinary: Negative for difficulty urinating and dysuria.   Musculoskeletal: Negative for arthralgias and gait problem.   Skin: Negative for color change and rash.   Neurological: Positive for weakness. Negative for dizziness and headaches.   Hematological: Negative for adenopathy. Does not bruise/bleed easily.   Psychiatric/Behavioral: Positive for confusion. Negative for agitation and sleep disturbance.       Objective:     Vital Signs  Vitals:    17 0605 17 0702 17 0758 17 0800   BP:  105/72     BP Location: Right arm      Patient Position: Lying      Pulse:    111   Resp: 20  20    Temp:   98.6 °F (37 °C)    TempSrc:   Oral    SpO2:    100%   Weight:       Height:           Physical Exam  Physical Exam   Constitutional: She is oriented to person, place, and time. She appears well-developed. She appears cachectic. No distress.   HENT:   Head: Normocephalic and  atraumatic.   Right Ear: External ear normal.   Left Ear: External ear normal.   Nose: Nose normal.   Eyes: Pupils are equal, round, and reactive to light.   Neck: Normal range of motion. Neck supple.   Cardiovascular: Normal rate, regular rhythm and normal heart sounds.  Exam reveals no gallop and no friction rub.    No murmur heard.  Pulmonary/Chest: Effort normal and breath sounds normal. No respiratory distress. She has no wheezes. She has no rales. She exhibits no tenderness.   Abdominal: Soft. Bowel sounds are normal. She exhibits distension. She exhibits no mass. There is no tenderness. There is no rebound and no guarding.   Neurological: She is alert and oriented to person, place, and time.   Skin: She is not diaphoretic.       Medication Review    Current Facility-Administered Medications:   •  acetaminophen (TYLENOL) tablet 650 mg, 650 mg, Oral, Q4H PRN, Mo Garcia MD  •  bisacodyl (DULCOLAX) suppository 10 mg, 10 mg, Rectal, Daily PRN, Mo Garcia MD  •  cyclobenzaprine (FLEXERIL) tablet 10 mg, 10 mg, Oral, BID, Mo Garcia MD, 10 mg at 08/08/17 1747  •  dextrose (D50W) solution 50 mL, 50 mL, Intravenous, Q1H PRN, Diogo Dickerson MD, 50 mL at 08/02/17 0034  •  docusate sodium (COLACE) capsule 100 mg, 100 mg, Oral, BID, Mo Garcia MD, 100 mg at 08/06/17 1813  •  famotidine (PEPCID) tablet 20 mg, 20 mg, Oral, BID, Mo Garcia MD, 20 mg at 08/08/17 1747  •  furosemide (LASIX) injection 20 mg, 20 mg, Intravenous, Q12H, Roc Barnes MD, 20 mg at 08/08/17 2126  •  hydrALAZINE (APRESOLINE) injection 10 mg, 10 mg, Intravenous, Q6H PRN, Mo Garcia MD  •  ibuprofen (ADVIL,MOTRIN) tablet 600 mg, 600 mg, Oral, Q6H PRN, Lobo Gu MD  •  loperamide (IMODIUM) capsule 2 mg, 2 mg, Oral, TID PRN, Roc Barnes MD, 2 mg at 08/08/17 0915  •  LORazepam (ATIVAN) tablet 0.5 mg, 0.5 mg, Oral, Q2H PRN **OR** LORazepam (ATIVAN) injection 0.5 mg, 0.5  mg, Intravenous, Q2H PRN **OR** LORazepam (ATIVAN) tablet 1 mg, 1 mg, Oral, Q1H PRN **OR** LORazepam (ATIVAN) injection 1 mg, 1 mg, Intravenous, Q1H PRN **OR** LORazepam (ATIVAN) injection 1 mg, 1 mg, Intravenous, Q15 Min PRN **OR** LORazepam (ATIVAN) injection 1 mg, 1 mg, Intramuscular, Q15 Min PRN **OR** LORazepam (ATIVAN) tablet 2 mg, 2 mg, Oral, Q1H PRN **OR** LORazepam (ATIVAN) injection 2 mg, 2 mg, Intravenous, Q1H PRN, Lobo Gu MD, 2 mg at 08/06/17 0844  •  LORazepam (ATIVAN) injection 1 mg, 1 mg, Intravenous, Q6H PRN, Mo Garcia MD, 1 mg at 08/08/17 2222  •  midodrine (PROAMATINE) tablet 10 mg, 10 mg, Oral, TID AC, Roc Barnes MD, 10 mg at 08/08/17 1747  •  Morphine sulfate (PF) injection 1 mg, 1 mg, Intravenous, Q4H PRN, Roc Barnes MD, 1 mg at 08/09/17 0005  •  nicotine (NICODERM CQ) 21 MG/24HR patch 1 patch, 1 patch, Transdermal, Q24H, Mo Garcia MD, 1 patch at 08/07/17 0838  •  norepinephrine (LEVOPHED) 8,000 mcg in sodium chloride 0.9 % 250 mL (32 mcg/mL) infusion, 0.02-0.3 mcg/kg/min, Intravenous, Titrated, Lobo Gu MD  •  ondansetron (ZOFRAN) tablet 4 mg, 4 mg, Oral, Q6H PRN **OR** ondansetron ODT (ZOFRAN-ODT) disintegrating tablet 4 mg, 4 mg, Oral, Q6H PRN **OR** ondansetron (ZOFRAN) injection 4 mg, 4 mg, Intravenous, Q6H PRN, Mo Garcia MD, 4 mg at 08/08/17 1006  •  pantoprazole (PROTONIX) EC tablet 40 mg, 40 mg, Oral, QAM, Mo Gracia MD, 40 mg at 08/08/17 0628  •  potassium chloride (MICRO-K) CR capsule 40 mEq, 40 mEq, Oral, PRN, 40 mEq at 08/05/17 1755 **OR** potassium chloride (KLOR-CON) packet 40 mEq, 40 mEq, Oral, PRN **OR** potassium chloride 10 mEq in 100 mL IVPB, 10 mEq, Intravenous, Q1H PRN, Mo Garcia MD, Last Rate: 100 mL/hr at 08/07/17 1141, 10 mEq at 08/07/17 1141  •  potassium chloride 10 mEq in 100 mL IVPB, 10 mEq, Intravenous, Q1H PRN, Lobo Gu MD, Last Rate: 100 mL/hr at 08/07/17 1039, 10  mEq at 08/07/17 1039  •  propranolol (INDERAL) tablet 20 mg, 20 mg, Oral, Q8H, Lobo Gu MD, 20 mg at 08/08/17 0628  •  sodium chloride 0.9 % flush 1-10 mL, 1-10 mL, Intravenous, PRN, Mo Garcia MD, 10 mL at 08/06/17 1824  •  Insert peripheral IV, , , Once **AND** sodium chloride 0.9 % flush 10 mL, 10 mL, Intravenous, PRN, Diogo Dickerson MD, 10 mL at 08/08/17 0917  •  sodium chloride tablet 2 g, 2 g, Oral, TID With Meals, Roc Barnes MD, 2 g at 08/08/17 1747  •  spironolactone (ALDACTONE) tablet 25 mg, 25 mg, Oral, Daily, Roc Barnes MD, 25 mg at 08/08/17 0915  •  traMADol (ULTRAM) tablet 50 mg, 50 mg, Oral, Q8H PRN, Roc Barnes MD, 50 mg at 08/07/17 2012  •  venlafaxine (EFFEXOR) tablet 12.5 mg, 12.5 mg, Oral, QAM, Roc Barnes MD  •  Vitamin D3 50,000 Units, 50,000 Units, Oral, Once per day on Mon Thu, Roc Barnes MD, 50,000 Units at 08/07/17 1040  •  zolpidem (AMBIEN) tablet 5 mg, 5 mg, Oral, Nightly PRN, Roc Barnes MD, 5 mg at 08/07/17 2133     Diagnostic Data    Lab Results (last 24 hours)     Procedure Component Value Units Date/Time    CBC (No Diff) [[id number]]  (Abnormal) Collected:  08/09/17 0228    Specimen:  Blood Updated:  08/09/17 0320     WBC 6.09 10*3/mm3      RBC 2.96 (L) 10*6/mm3      Hemoglobin 8.6 (L) g/dL      Hematocrit 26.0 (L) %      MCV 87.8 fL      MCH 29.1 pg      MCHC 33.1 g/dL      RDW 15.1 (H) %      RDW-SD 48.2 (H) fl      MPV 13.3 (H) fL      Platelets 27 (L) 10*3/mm3     Comprehensive Metabolic Panel [[id number]]  (Abnormal) Collected:  08/09/17 0228    Specimen:  Blood Updated:  08/09/17 0336     Glucose 99 mg/dL      BUN 24 (H) mg/dL      Creatinine 0.50 mg/dL      Sodium 133 (L) mmol/L      Potassium 3.4 (L) mmol/L      Chloride 97 mmol/L      CO2 31.0 mmol/L      Calcium 7.8 (L) mg/dL      Total Protein 4.5 (L) g/dL      Albumin 2.00 (L) g/dL      ALT (SGPT) 48 U/L      AST (SGOT) 18 U/L      Alkaline Phosphatase 81 U/L      Total Bilirubin 1.0 mg/dL       eGFR   Amer 167 (H) mL/min/1.73      Globulin 2.5 gm/dL      A/G Ratio 0.8 (L) g/dL      BUN/Creatinine Ratio 48.0 (H)     Anion Gap 5.0 mmol/L     Magnesium [[id number]]  (Abnormal) Collected:  08/09/17 0228    Specimen:  Blood Updated:  08/09/17 0336     Magnesium 1.5 (L) mg/dL         I reviewed the patient's new clinical results.    Assessment/Plan:     Active Hospital Problems (** Indicates Principal Problem)    Diagnosis Date Noted   • **Alcoholic cirrhosis of liver with ascites [K70.31] 07/19/2017     CIWA.  GI following.  Await recommendations.     • Hypomagnesemia [E83.42] 08/09/2017     Replace.  Recheck     • Thrombocytopenia [D69.6] 08/09/2017     Secondary to cirrhosis.  Following platelets.  Been stable around 30.      Results from last 7 days  Lab Units 08/09/17  0228 08/08/17  0435 08/08/17  0001 08/07/17  1607 08/07/17  1220 08/07/17  0526 08/06/17  1831   PLATELETS 10*3/mm3 27* 31* 34* 35* 31* 42* 33*          • Hypokalemia [E87.6] 08/07/2017 08/09/17.  Replace today.  Magnesium low today.  Replace both.  Recheck.  Monitor.    08/08/17.  At goal today.  Monitor.    Replace.  Recheck.  Magnesium at 1.7 mg/dL (Normal)     • Traumatic pneumothorax [S27.0XXA] 08/07/2017 08/09/17.  X-ray pending.  Follow x-ray.  No respiratory distress.    08/08/17.  Improving.  Almost resolved per x-ray.  Will continue to monitor.    Imaging Results (last 24 hours)     Procedure Component Value Units Date/Time    XR Chest 1 View [[id number]] Collected:  08/07/17 1823     Updated:  08/07/17 1835    Narrative:         EXAM:         Radiograph(s), Chest   VIEWS:   Portable ; 1       DATE/TIME: 8/7/2017 6:32 PM CDT               INDICATION:     Pneumothorax follow-up      COMPARISON:   CXR: 8/7/17 at 04:14 hours          FINDINGS:           - lines/tubes:     Right approach central venous catheter,  unchanged.     - cardiac:          size within normal limits         - mediastinum:  contour within normal  limits         - lungs:          no focal air space process, pulmonary  interstitial edema, nodule(s)/mass             - pleura:          Previously described right-sided pneumothorax  has decreased in size with a small residual pneumothorax  remaining inferiorly/laterally on the right.                  - osseous:          unremarkable for age                  - misc.:        Impression:       CONCLUSION:        1. Interval decrease in size of the right pneumothorax.    Maintain short interval radiographic follow-up to document  resolution.                      Electronically signed by:  SHAR Melendrez MD  8/7/2017 6:34 PM  CDT Workstation: [id number]    XR Chest 1 View [[id number]] Collected:  08/08/17 0816     Updated:  08/08/17 0837    Narrative:       Chest single view.     CLINICAL INDICATION: Shortness of breath. Right-sided  pneumothorax, follow-up.    COMPARISON: Chest x-ray August 7, 2017.    FINDINGS: Cardiac silhouette is normal in size. Pulmonary  vascularity is unremarkable.     Small right-sided pneumothorax less than 5%.     0.9 cm of space between the pleural reflection and the chest wall  at the right lung base. Similar size to prior examination August 7, 2017.   Clearing of discoid infiltrative changes left lung base. Partial  clearing of discoid infiltrative, atelectatic changes right lung  base.      Impression:       CONCLUSION: Small right-sided pneumothorax unchanged since prior  examination. Clearing of discoid atelectatic changes at left lung  base. Partial clearing of discoid atelectatic changes right lung  base.    Electronically signed by:  Nickolas Gonzales MD  8/8/2017 8:36 AM CDT  Workstation: [id number]            Stable on x-ray.  Dr. Wang discussed case with CT surgery yesterday.  Monitoring.  Chest tube as needed with desaturation or worsening pneumothorax on x-ray.     • Hyponatremia [E87.1] 08/07/2017     Nephrology following.     • Ascites due to alcoholic cirrhosis [K70.31]  07/19/2017     GI following.  Await recommendations.     • Moderate episode of recurrent major depressive disorder [F33.1] 07/19/2017   • Chronic midline low back pain [M54.5, G89.29] 01/17/2017   • Chronic pelvic pain in female [R10.2, G89.29] 01/17/2017      Resolved Hospital Problems    Diagnosis Date Noted Date Resolved   No resolved problems to display.     Will monitor patient's hospital course and adjust treatment as hospital course dictates.    DVT prophylaxis: SCD/TEDs  Code status is Full Code    Plan for disposition:Unknown      Time:           This document has been electronically signed by Luis F Jalloh MD on August 9, 2017 8:07 AM

## 2017-08-09 NOTE — SIGNIFICANT NOTE
08/09/17 1409   Rehab Treatment   Discipline physical therapist   Rehab Evaluation   Evaluation Not Performed other (see comments)  (RN had just given morphine about 15 minutes ago and patient was sleeping- RN declined therapy at this time)   Recommendation   PT - Next Appointment 08/10/17

## 2017-08-09 NOTE — CONSULTS
Adult Nutrition  Assessment    Patient Name:  Joseline Parnell  YOB: 1980  MRN: [id number]  Admit Date:  8/1/2017    Assessment Date:  8/9/2017          Reason for Assessment       08/09/17 1315    Reason for Assessment    Reason For Assessment/Visit follow up protocol              Nutrition/Diet History       08/09/17 1315    Nutrition/Diet History    Typical Food/Fluid Intake Pt sleeping.  Per her nsg she was too lethargic to eat this am and yesterday she threw up everything that she ate.  MD is aware              Labs/Tests/Procedures/Meds       08/09/17 1318    Labs/Tests/Procedures/Meds    Labs/Tests Review Reviewed;Na+;K+;BUN;Alb    Medication Review Reviewed, pertinent;Diuretic;MgSO4            Physical Findings       08/09/17 1319    Physical Appearance    Overall Physical Appearance loss of subcutaneous fat;loss of muscle mass;cachetic              Nutrition Prescription Ordered       08/09/17 1320    Nutrition Prescription PO    Current PO Diet Regular    Supplement Ensure Enlive    Supplement Frequency 3 times a day    Common Modifiers Cardiac            Evaluation of Received Nutrient/Fluid Intake       08/09/17 1321    PO Evaluation    Number of Days PO Intake Evaluated Insufficient Data    % PO Intake Negligible po intake            Comments:  Pt with overall negligible po intake related to pt being lethargic, poor appetite, and poor po tolerance.  She did take a few bites yesterday per staff but had emesis afterwards.  Protein calorie malnutrition masked previous by fluid status.  Will recommend nutrition support if appropriate.  Rd will monitor.         Electronically signed by:  Akosua Motley RD  08/09/17 1:26 PM

## 2017-08-09 NOTE — PROGRESS NOTES
Chandrakant You DO,[facility]  Gastroenterology  Hepatology  Endoscopy  Board Certified in Internal Medicine and gastroenterology  [address], [address]  [place]. [address]  - [phone]   F - [phone]     GASTROENTEROLOGY PROGRESS NOTE   CHANDRAKANT YOU DO.         SUBJECTIVE:   8/9/2017  Chief Complaint:     Subjective  : The patient is doing better.  The patient has had less problems with confusion.  The patient is taking in nutrition.  There has been no worsening of the pain.  There has been no worsening of the ascites    I discussed all of the above with the family that is available.  They understand that this patient was discontinue all of her alcohol usage.         CURRENT MEDICATIONS/OBJECTIVE/VS/PE:     Current Medications:     Current Facility-Administered Medications   Medication Dose Route Frequency Provider Last Rate Last Dose   • acetaminophen (TYLENOL) tablet 650 mg  650 mg Oral Q4H PRN Mo Garcia MD       • bacitracin ointment   Topical Q12H Luis F Jalloh MD       • bisacodyl (DULCOLAX) suppository 10 mg  10 mg Rectal Daily PRN Mo Garcia MD       • cyclobenzaprine (FLEXERIL) tablet 10 mg  10 mg Oral BID Mo Garcia MD   10 mg at 08/09/17 1734   • dextrose (D50W) solution 50 mL  50 mL Intravenous Q1H PRN Diogo Dickerson MD   50 mL at 08/02/17 0034   • docusate sodium (COLACE) capsule 100 mg  100 mg Oral BID Mo Garcia MD   100 mg at 08/09/17 1734   • famotidine (PEPCID) tablet 20 mg  20 mg Oral BID Mo Garcia MD   20 mg at 08/09/17 1729   • furosemide (LASIX) injection 20 mg  20 mg Intravenous Q12H Roc Barnes MD   20 mg at 08/09/17 0856   • hydrALAZINE (APRESOLINE) injection 10 mg  10 mg Intravenous Q6H PRN Mo Garcia MD       • ibuprofen (ADVIL,MOTRIN) tablet 600 mg  600 mg Oral Q6H PRN Lobo Gu MD       • loperamide (IMODIUM) capsule 2 mg  2 mg Oral TID PRN Roc Barnes MD   2 mg at 08/08/17  0915   • LORazepam (ATIVAN) tablet 0.5 mg  0.5 mg Oral Q2H PRN Lobo Gu MD        Or   • LORazepam (ATIVAN) injection 0.5 mg  0.5 mg Intravenous Q2H PRN Lobo Gu MD        Or   • LORazepam (ATIVAN) tablet 1 mg  1 mg Oral Q1H PRN Lobo Gu MD        Or   • LORazepam (ATIVAN) injection 1 mg  1 mg Intravenous Q1H PRN Lobo Gu MD        Or   • LORazepam (ATIVAN) injection 1 mg  1 mg Intravenous Q15 Min PRN Lobo Gu MD        Or   • LORazepam (ATIVAN) injection 1 mg  1 mg Intramuscular Q15 Min PRN Lobo Gu MD        Or   • LORazepam (ATIVAN) tablet 2 mg  2 mg Oral Q1H PRN Lobo Gu MD        Or   • LORazepam (ATIVAN) injection 2 mg  2 mg Intravenous Q1H PRN Lobo Gu MD   2 mg at 08/06/17 0844   • LORazepam (ATIVAN) injection 1 mg  1 mg Intravenous Q6H PRN Mo Garcia MD   1 mg at 08/08/17 2222   • magnesium sulfate in D5W 1g/100mL (PREMIX) 1 g IVPB  1 g Intravenous Q4H Roc Barnes MD   1 g at 08/09/17 1828   • midodrine (PROAMATINE) tablet 10 mg  10 mg Oral TID AC Roc Barnes MD   10 mg at 08/09/17 1724   • Morphine sulfate (PF) injection 1 mg  1 mg Intravenous Q4H PRN Roc Barnes MD   1 mg at 08/09/17 1331   • nicotine (NICODERM CQ) 21 MG/24HR patch 1 patch  1 patch Transdermal Q24H Mo Garcia MD   1 patch at 08/09/17 0915   • norepinephrine (LEVOPHED) 8,000 mcg in sodium chloride 0.9 % 250 mL (32 mcg/mL) infusion  0.02-0.3 mcg/kg/min Intravenous Titrated Lobo Gu MD       • ondansetron (ZOFRAN) tablet 4 mg  4 mg Oral Q6H PRN Mo Garcia MD        Or   • ondansetron ODT (ZOFRAN-ODT) disintegrating tablet 4 mg  4 mg Oral Q6H PRN Mo Gacria MD        Or   • ondansetron (ZOFRAN) injection 4 mg  4 mg Intravenous Q6H PRN Mo Garcia MD   4 mg at 08/08/17 1006   • pantoprazole (PROTONIX) EC tablet 40 mg  40 mg Oral [facility] Mo Garcia MD   40 mg at 08/08/17 0628   • potassium  chloride (MICRO-K) CR capsule 40 mEq  40 mEq Oral PRN Mo Garcia MD   40 mEq at 08/05/17 1755    Or   • potassium chloride (KLOR-CON) packet 40 mEq  40 mEq Oral PRN Mo Garcia MD        Or   • potassium chloride 10 mEq in 100 mL IVPB  10 mEq Intravenous Q1H PRN Mo Garcia  mL/hr at 08/07/17 1141 10 mEq at 08/07/17 1141   • potassium chloride 10 mEq in 100 mL IVPB  10 mEq Intravenous Q1H PRN Lobo Gu  mL/hr at 08/07/17 1039 10 mEq at 08/07/17 1039   • potassium chloride 20 mEq in 50 mL IVPB  20 mEq Intravenous Q1H PRN Roc Barnes MD 50 mL/hr at 08/09/17 1038 20 mEq at 08/09/17 1038   • propranolol (INDERAL) tablet 20 mg  20 mg Oral Q8H Lobo Gu MD   20 mg at 08/09/17 1718   • sodium chloride 0.9 % flush 1-10 mL  1-10 mL Intravenous PRN Mo Garcia MD   10 mL at 08/06/17 1824   • sodium chloride 0.9 % flush 10 mL  10 mL Intravenous PRN Diogo Dickerson MD   10 mL at 08/08/17 0917   • sodium chloride tablet 1 g  1 g Oral TID With Meals Roc Barnes MD   1 g at 08/09/17 1730   • spironolactone (ALDACTONE) tablet 25 mg  25 mg Oral Daily Roc Barnes MD   25 mg at 08/08/17 0915   • traMADol (ULTRAM) tablet 50 mg  50 mg Oral Q8H PRN Roc Barnes MD   50 mg at 08/07/17 2012   • venlafaxine (EFFEXOR) tablet 12.5 mg  12.5 mg Oral QAM Roc Barnes MD       • Vitamin D3 50,000 Units  50,000 Units Oral Once per day on Mon Thu Roc Barnes MD   50,000 Units at 08/07/17 1040   • zolpidem (AMBIEN) tablet 5 mg  5 mg Oral Nightly PRN Roc Barnes MD   5 mg at 08/07/17 2133       Objective     Physical Exam:   Temp:  [97.5 °F (36.4 °C)-98.6 °F (37 °C)] 98.1 °F (36.7 °C)  Heart Rate:  [102-122] 122  Resp:  [16-22] 22  BP: ()/(65-89) 113/88     Physical Exam:  General Appearance:    Alert, cooperative, in no acute distress   Head:    Normocephalic, without obvious abnormality, atraumatic   Eyes:            Lids and lashes normal, conjunctivae and  sclerae normal, no   icterus, no pallor, corneas clear, PERRLA   Ears:    Ears appear intact with no abnormalities noted   Throat:   No oral lesions, no thrush, oral mucosa moist   Neck:   No adenopathy, supple, trachea midline, no thyromegaly, no     carotid bruit, no JVD   Back:     No kyphosis present, no scoliosis present, no skin lesions,       erythema or scars, no tenderness to percussion or                   palpation,   range of motion normal   Lungs:     Clear to auscultation,respirations regular, even and                   unlabored    Heart:    Regular rhythm and normal rate, normal S1 and S2, no            murmur, no gallop, no rub, no click   Breast Exam:    Deferred   Abdomen:     Normal bowel sounds, no masses, no organomegaly, soft        Generalized tenderness, non-distended, no guarding, no rebound                 tenderness-ascites    Genitalia:    Deferred   Extremities:   Moves all extremities well, 1+ edema, no cyanosis, no              redness   Pulses:   Pulses palpable and equal bilaterally   Skin:   No bleeding, bruising or rash   Lymph nodes:   No palpable adenopathy   Neurologic:   Cranial nerves 2 - 12 grossly intact, sensation intact, DTR        present and equal bilaterally      Results Review:     Lab Results (last 24 hours)     Procedure Component Value Units Date/Time    CBC (No Diff) [[id number]]  (Abnormal) Collected:  08/09/17 0228    Specimen:  Blood Updated:  08/09/17 0320     WBC 6.09 10*3/mm3      RBC 2.96 (L) 10*6/mm3      Hemoglobin 8.6 (L) g/dL      Hematocrit 26.0 (L) %      MCV 87.8 fL      MCH 29.1 pg      MCHC 33.1 g/dL      RDW 15.1 (H) %      RDW-SD 48.2 (H) fl      MPV 13.3 (H) fL      Platelets 27 (L) 10*3/mm3     Comprehensive Metabolic Panel [[id number]]  (Abnormal) Collected:  08/09/17 0228    Specimen:  Blood Updated:  08/09/17 0336     Glucose 99 mg/dL      BUN 24 (H) mg/dL      Creatinine 0.50 mg/dL      Sodium 133 (L) mmol/L      Potassium 3.4 (L) mmol/L       Chloride 97 mmol/L      CO2 31.0 mmol/L      Calcium 7.8 (L) mg/dL      Total Protein 4.5 (L) g/dL      Albumin 2.00 (L) g/dL      ALT (SGPT) 48 U/L      AST (SGOT) 18 U/L      Alkaline Phosphatase 81 U/L      Total Bilirubin 1.0 mg/dL      eGFR   Amer 167 (H) mL/min/1.73      Globulin 2.5 gm/dL      A/G Ratio 0.8 (L) g/dL      BUN/Creatinine Ratio 48.0 (H)     Anion Gap 5.0 mmol/L     Magnesium [[id number]]  (Abnormal) Collected:  08/09/17 0228    Specimen:  Blood Updated:  08/09/17 0336     Magnesium 1.5 (L) mg/dL     Potassium [[id number]]  (Normal) Collected:  08/09/17 1605    Specimen:  Blood Updated:  08/09/17 1630     Potassium 4.1 mmol/L            I reviewed the patient's new clinical results.  I reviewed the patient's new imaging results and agree with the interpretation.     ASSESSMENT/PLAN:   ASSESSMENT:   1.  Cirrhosis secondary to alcohol, Michael classification C  2.  Ascites without portal vein thrombosis, stable  3.  Protein calorie malnutrition  4.  Thrombocytopenia, severe secondary to sepsis as well as splenic sequestration    PLAN:   1.  Supportive care  2.  In the future, the patient will need to have a esophagogastroduodenoscopy to look for esophageal varices and therapy as this required.  However, with the patient's thrombocytopenia currently, we do not feel that that is safe.       Chandrakant You DO  08/09/17  6:30 PM

## 2017-08-09 NOTE — PROGRESS NOTES
Chandrakant You DO,[facility]  Gastroenterology  Hepatology  Endoscopy  Board Certified in Internal Medicine and gastroenterology  [address], [address]  [place]. [address]  - [phone]   F [phone]     GASTROENTEROLOGY PROGRESS NOTE   CHANDRAKANT YOU DO.         SUBJECTIVE:   8/8/2017  Chief Complaint:     Subjective  : Is much more alert today.  The patient says that she feels much better and feels as if she has slept for a long period to time.  She denies any problems with any abdominal pain except for the ascites.  There has been no evidence of any vomiting of blood.  The patient's hyperdynamic state is much improved.  She has no evidence of any active ongoing delirium tremors    CURRENT MEDICATIONS/OBJECTIVE/VS/PE:     Current Medications:     Current Facility-Administered Medications   Medication Dose Route Frequency Provider Last Rate Last Dose   • acetaminophen (TYLENOL) tablet 650 mg  650 mg Oral Q4H PRN Mo Garcia MD       • bisacodyl (DULCOLAX) suppository 10 mg  10 mg Rectal Daily PRN Mo Garcia MD       • cyclobenzaprine (FLEXERIL) tablet 10 mg  10 mg Oral BID Mo Garcia MD   10 mg at 08/08/17 1747   • dextrose (D50W) solution 50 mL  50 mL Intravenous Q1H PRN Diogo Dickerson MD   50 mL at 08/02/17 0034   • docusate sodium (COLACE) capsule 100 mg  100 mg Oral BID Mo Garcia MD   100 mg at 08/06/17 1813   • famotidine (PEPCID) tablet 20 mg  20 mg Oral BID Mo Garcia MD   20 mg at 08/08/17 1747   • furosemide (LASIX) injection 20 mg  20 mg Intravenous Q12H Roc Barnes MD   20 mg at 08/08/17 0916   • hydrALAZINE (APRESOLINE) injection 10 mg  10 mg Intravenous Q6H PRN Mo Garcia MD       • ibuprofen (ADVIL,MOTRIN) tablet 600 mg  600 mg Oral Q6H PRN Lobo Gu MD       • loperamide (IMODIUM) capsule 2 mg  2 mg Oral TID PRN Roc Barnes MD   2 mg at 08/08/17 9088   • LORazepam (ATIVAN) tablet 0.5 mg   0.5 mg Oral Q2H PRN Lobo Gu MD        Or   • LORazepam (ATIVAN) injection 0.5 mg  0.5 mg Intravenous Q2H PRN Lobo Gu MD        Or   • LORazepam (ATIVAN) tablet 1 mg  1 mg Oral Q1H PRN Lobo Gu MD        Or   • LORazepam (ATIVAN) injection 1 mg  1 mg Intravenous Q1H PRN Lobo Gu MD        Or   • LORazepam (ATIVAN) injection 1 mg  1 mg Intravenous Q15 Min PRN Lobo Gu MD        Or   • LORazepam (ATIVAN) injection 1 mg  1 mg Intramuscular Q15 Min PRN Lobo Gu MD        Or   • LORazepam (ATIVAN) tablet 2 mg  2 mg Oral Q1H PRN Lobo Gu MD        Or   • LORazepam (ATIVAN) injection 2 mg  2 mg Intravenous Q1H PRN Lobo Gu MD   2 mg at 08/06/17 0844   • LORazepam (ATIVAN) injection 1 mg  1 mg Intravenous Q6H PRN Mo Garcia MD       • midodrine (PROAMATINE) tablet 10 mg  10 mg Oral TID AC Roc Barnes MD   10 mg at 08/08/17 1747   • Morphine sulfate (PF) injection 1 mg  1 mg Intravenous Q4H PRN Roc Barnes MD   1 mg at 08/06/17 2103   • nicotine (NICODERM CQ) 21 MG/24HR patch 1 patch  1 patch Transdermal Q24H Mo Garcia MD   1 patch at 08/07/17 0838   • norepinephrine (LEVOPHED) 8,000 mcg in sodium chloride 0.9 % 250 mL (32 mcg/mL) infusion  0.02-0.3 mcg/kg/min Intravenous Titrated Lobo Gu MD       • ondansetron (ZOFRAN) tablet 4 mg  4 mg Oral Q6H PRN Mo Garcia MD        Or   • ondansetron ODT (ZOFRAN-ODT) disintegrating tablet 4 mg  4 mg Oral Q6H PRN Mo Garcia MD        Or   • ondansetron (ZOFRAN) injection 4 mg  4 mg Intravenous Q6H PRN Mo Garcia MD   4 mg at 08/08/17 1006   • pantoprazole (PROTONIX) EC tablet 40 mg  40 mg Oral QAM Mo Garcia MD   40 mg at 08/08/17 0628   • potassium chloride (MICRO-K) CR capsule 40 mEq  40 mEq Oral PRN Mo Garcia MD   40 mEq at 08/05/17 1755    Or   • potassium chloride (KLOR-CON) packet 40 mEq  40 mEq Oral PRN Mo  Gauri Garcia MD        Or   • potassium chloride 10 mEq in 100 mL IVPB  10 mEq Intravenous Q1H PRN Mo Garcia  mL/hr at 08/07/17 1141 10 mEq at 08/07/17 1141   • potassium chloride 10 mEq in 100 mL IVPB  10 mEq Intravenous Q1H PRN Lobo Gu  mL/hr at 08/07/17 1039 10 mEq at 08/07/17 1039   • propranolol (INDERAL) tablet 20 mg  20 mg Oral Q8H Lobo Gu MD   20 mg at 08/08/17 0628   • sodium chloride 0.9 % flush 1-10 mL  1-10 mL Intravenous PRN Mo Garcia MD   10 mL at 08/06/17 1824   • sodium chloride 0.9 % flush 10 mL  10 mL Intravenous PRN Diogo Dickerson MD   10 mL at 08/08/17 0917   • sodium chloride tablet 2 g  2 g Oral TID With Meals Roc Barnes MD   2 g at 08/08/17 1747   • spironolactone (ALDACTONE) tablet 25 mg  25 mg Oral Daily Roc Barnes MD   25 mg at 08/08/17 0915   • traMADol (ULTRAM) tablet 50 mg  50 mg Oral Q8H PRN Roc Barnes MD   50 mg at 08/07/17 2012   • [START ON 8/9/2017] venlafaxine (EFFEXOR) tablet 12.5 mg  12.5 mg Oral QAM Roc Barnes MD       • Vitamin D3 50,000 Units  50,000 Units Oral Once per day on Mon Thu Roc Barnes MD   50,000 Units at 08/07/17 1040   • zolpidem (AMBIEN) tablet 5 mg  5 mg Oral Nightly PRN Roc Barnes MD   5 mg at 08/07/17 2133       Objective     Physical Exam:   Temp:  [97.6 °F (36.4 °C)-98.9 °F (37.2 °C)] 98.9 °F (37.2 °C)  Heart Rate:  [] 103  Resp:  [12-24] 14  BP: ()/(55-85) 95/70     Physical Exam:  General Appearance:    Alert, cooperative, With temporal wasting    Head:    Normocephalic, without obvious abnormality, atraumatic   Eyes:            Lids and lashes normal, conjunctivae and sclerae normal, no   icterus, no pallor, corneas clear, PERRLA   Ears:    Ears appear intact with no abnormalities noted   Throat:   No oral lesions, no thrush, oral mucosa moist   Neck:   No adenopathy, supple, trachea midline, no thyromegaly, no     carotid bruit, no JVD   Back:     No kyphosis  present, no scoliosis present, no skin lesions,       erythema or scars, no tenderness to percussion or                   palpation,   range of motion normal   Lungs:     Clear to auscultation,respirations regular, even and                   unlabored    Heart:    Regular rhythm and normal rate, normal S1 and S2, no            murmur, no gallop, no rub, no click   Breast Exam:    Deferred   Abdomen:     Normal bowel sounds, no masses, no organomegaly, soft        non-tender, distended, no guarding, no rebound                 tenderness-Ascites    Genitalia:    Deferred   Extremities:   Moves all extremities well, no edema, no cyanosis, no              redness   Pulses:   Pulses palpable and equal bilaterally   Skin:   No bleeding, bruising or rash   Lymph nodes:   No palpable adenopathy   Neurologic:   Cranial nerves 2 - 12 grossly intact, sensation intact, DTR        present and equal bilaterally      Results Review:     Lab Results (last 24 hours)     Procedure Component Value Units Date/Time    CBC & Differential [[id number]] Collected:  08/07/17 1607    Specimen:  Blood Updated:  08/07/17 2130    Narrative:       The following orders were created for panel order CBC & Differential.  Procedure                               Abnormality         Status                     ---------                               -----------         ------                     Scan Slide[[id number]]                                       Final result               CBC Auto Differential[[id number]]        Abnormal            Final result                 Please view results for these tests on the individual orders.    Scan Slide [[id number]] Collected:  08/07/17 1607    Specimen:  Blood Updated:  08/07/17 2130     Anisocytosis Slight/1+     Hypochromia Slight/1+     Poikilocytes Slight/1+     WBC Morphology Normal     Platelet Estimate Decreased    CBC Auto Differential [[id number]]  (Abnormal) Collected:  08/08/17 0001    Specimen:  Blood  Updated:  08/08/17 0114     WBC 6.07 10*3/mm3      RBC 3.05 (L) 10*6/mm3      Hemoglobin 9.0 (L) g/dL      Hematocrit 26.6 (L) %      MCV 87.2 fL      MCH 29.5 pg      MCHC 33.8 g/dL      RDW 15.1 (H) %      RDW-SD 47.5 (H) fl      MPV 12.3 (H) fL      Platelets 34 (L) 10*3/mm3     Extra Tubes [[id number]] Collected:  08/08/17 0001    Specimen:  Blood from Blood, Venous Line Updated:  08/08/17 0202    Narrative:       The following orders were created for panel order Extra Tubes.  Procedure                               Abnormality         Status                     ---------                               -----------         ------                     Green Top (Gel)[[id number]]                                  Final result                 Please view results for these tests on the individual orders.    Green Top (Gel) [[id number]] Collected:  08/08/17 0001    Specimen:  Blood Updated:  08/08/17 0202     Extra Tube Hold for add-ons.      Auto resulted.       CBC Auto Differential [[id number]]  (Abnormal) Collected:  08/08/17 0435    Specimen:  Blood Updated:  08/08/17 0455     WBC 5.98 10*3/mm3      RBC 3.05 (L) 10*6/mm3      Hemoglobin 9.0 (L) g/dL      Hematocrit 26.7 (L) %      MCV 87.5 fL      MCH 29.5 pg      MCHC 33.7 g/dL      RDW 15.1 (H) %      RDW-SD 48.2 (H) fl      MPV 12.4 (H) fL      Platelets 31 (L) 10*3/mm3      Neutrophil % 87.4 (H) %      Lymphocyte % 8.4 (L) %      Monocyte % 3.5 %      Eosinophil % 0.2 %      Basophil % 0.2 %      Immature Grans % 0.3 %      Neutrophils, Absolute 5.23 10*3/mm3      Lymphocytes, Absolute 0.50 (L) 10*3/mm3      Monocytes, Absolute 0.21 10*3/mm3      Eosinophils, Absolute 0.01 10*3/mm3      Basophils, Absolute 0.01 10*3/mm3      Immature Grans, Absolute 0.02 10*3/mm3     CBC & Differential [[id number]] Collected:  08/08/17 0435    Specimen:  Blood Updated:  08/08/17 0455    Narrative:       The following orders were created for panel order CBC & Differential.  Procedure                                Abnormality         Status                     ---------                               -----------         ------                     Scan Slide[[id number]]                                                                  CBC Auto Differential[[id number]]        Abnormal            Final result                 Please view results for these tests on the individual orders.    Magnesium [[id number]]  (Normal) Collected:  08/08/17 0435    Specimen:  Blood Updated:  08/08/17 0500     Magnesium 1.6 mg/dL     Bilirubin, Direct [[id number]]  (Normal) Collected:  08/08/17 0435    Specimen:  Blood Updated:  08/08/17 0500     Bilirubin, Direct 0.0 mg/dL     Comprehensive Metabolic Panel [[id number]]  (Abnormal) Collected:  08/08/17 0435    Specimen:  Blood Updated:  08/08/17 0505     Glucose 116 (H) mg/dL      BUN 18 mg/dL      Creatinine 0.51 mg/dL      Sodium 132 (L) mmol/L      Potassium 3.5 mmol/L      Chloride 96 mmol/L      CO2 30.0 mmol/L      Calcium 7.6 (L) mg/dL      Total Protein 4.3 (L) g/dL      Albumin 1.90 (L) g/dL      ALT (SGPT) 57 (H) U/L      AST (SGOT) 34 U/L      Alkaline Phosphatase 87 U/L      Total Bilirubin 1.2 mg/dL      eGFR   Amer 164 (H) mL/min/1.73      Globulin 2.4 gm/dL      A/G Ratio 0.8 (L) g/dL      BUN/Creatinine Ratio 35.3 (H)     Anion Gap 6.0 mmol/L     CBC & Differential [[id number]] Collected:  08/08/17 0001    Specimen:  Blood Updated:  08/08/17 0607    Narrative:       The following orders were created for panel order CBC & Differential.  Procedure                               Abnormality         Status                     ---------                               -----------         ------                     Manual Differential[[id number]]          Abnormal            Final result               Scan Slide[[id number]]                                                                  CBC Auto Differential[[id number]]        Abnormal            Final result                  Please view results for these tests on the individual orders.    Manual Differential [[id number]]  (Abnormal) Collected:  08/08/17 0001    Specimen:  Blood Updated:  08/08/17 0607     Neutrophil % 67.0 %      Lymphocyte % 6.0 (L) %      Monocyte % 8.0 %      Basophil % 1.0 %      Bands %  16.0 (H) %      Myelocyte % 2.0 (H) %      Neutrophils Absolute 5.04 10*3/mm3      Lymphocytes Absolute 0.36 (L) 10*3/mm3      Monocytes Absolute 0.49 10*3/mm3      Basophils Absolute 0.06 10*3/mm3      Hypochromia Slight/1+     Target Cells Slight/1+     Toxic Granulation Slight/1+     Vacuolated Neutrophils Slight/1+     Platelet Estimate Decreased           I reviewed the patient's new clinical results.  I reviewed the patient's new imaging results and agree with the interpretation.     ASSESSMENT/PLAN:   ASSESSMENT:   1.  Cirrhosis of liver secondary to alcohol, Michael classification C  2.  Ascites without portal vein thrombosis  3.  Hepatic encephalopathy  4.  Anemia, chronic  5.  Protein calorie malnutrition   6.  Delirium tremors, improved    PLAN:   1.  Continue supportive care  2.  Nutritional support  3.  Paracentesis as needed  4.  Needs esophagogastroduodenoscopy in the future  5.  I have contacted Dr. Pendleton and reviewed with him regarding all of the patient's multiple medical problems associated with the cirrhosis.  He is her usual gastroenterologist in [place]      Chandrakant You DO  08/08/17  8:33 PM

## 2017-08-09 NOTE — PROGRESS NOTES
"[facility]  [address] [place] [address]. [id number]   - [phone]  F - [phone]     Progress Note          PATIENT  DEMOGRAPHICS   PATIENT NAME: Joseline Parnell                      PHYSICIAN: Roc Barnes MD  : 1980  MRN: [id number]   LOS: 7 days    Patient Care Team:  ERASMO Alvarez as PCP - General (Family Medicine)  Subjective   SUBJECTIVE     Confused drowsy       Objective   OBJECTIVE   Vital Signs  Temp:  [97.5 °F (36.4 °C)-98.9 °F (37.2 °C)] 98.6 °F (37 °C)  Heart Rate:  [100-111] 109  Resp:  [12-22] 20  BP: ()/(59-89) 114/79    Flowsheet Rows         First Filed Value    Admission Height  65\" (165.1 cm) Documented at [address]    Admission Weight  120 lb (54.4 kg) Documented at 2017 0046           I/O last 3 completed shifts:  In: 733 [P.O.:733]  Out: 1475 [Urine:1475]    PHYSICAL EXAM    Physical Exam   Constitutional: She is oriented to person, place, and time. She appears well-developed.   HENT:   Head: Normocephalic.   Eyes: Pupils are equal, round, and reactive to light.   Cardiovascular: Normal rate, regular rhythm and normal heart sounds.    Pulmonary/Chest: Effort normal and breath sounds normal.   Abdominal: Soft. Bowel sounds are normal. She exhibits distension.   Neurological: She is alert and oriented to person, place, and time.       RESULTS   Results Review:      Results from last 7 days  Lab Units 17  0228 17  0435 17  1607 17  0526   SODIUM mmol/L 133* 132*  --  131*   POTASSIUM mmol/L 3.4* 3.5 4.0 3.0*   CHLORIDE mmol/L 97 96  --  96   CO2 mmol/L 31.0 30.0  --  29.0   BUN mg/dL 24* 18  --  14   CREATININE mg/dL 0.50 0.51  --  0.45*   CALCIUM mg/dL 7.8* 7.6*  --  7.1*   BILIRUBIN mg/dL 1.0 1.2  --  2.2*   ALK PHOS U/L 81 87  --  79   ALT (SGPT) U/L 48 57*  --  57*   AST (SGOT) U/L 18 34  --  30   GLUCOSE mg/dL 99 116*  --  64       Estimated Creatinine Clearance: 100.7 mL/min (by C-G formula based on " Cr of 0.5).      Results from last 7 days  Lab Units 08/09/17  0228 08/08/17  0435 08/07/17  0526   MAGNESIUM mg/dL 1.5* 1.6 1.7         Results from last 7 days  Lab Units 08/02/17  1115   URIC ACID mg/dL 6.8         Results from last 7 days  Lab Units 08/09/17  0228 08/08/17  0435 08/08/17  0001 08/07/17  1607 08/07/17  1220   WBC 10*3/mm3 6.09 5.98 6.07 6.55 6.32   HEMOGLOBIN g/dL 8.6* 9.0* 9.0* 10.2* 8.9*   PLATELETS 10*3/mm3 27* 31* 34* 35* 31*               Imaging Results (last 24 hours)     Procedure Component Value Units Date/Time    XR Chest 1 View [[id number]] Collected:  08/08/17 0816     Updated:  08/08/17 0837    Narrative:       Chest single view.     CLINICAL INDICATION: Shortness of breath. Right-sided  pneumothorax, follow-up.    COMPARISON: Chest x-ray August 7, 2017.    FINDINGS: Cardiac silhouette is normal in size. Pulmonary  vascularity is unremarkable.     Small right-sided pneumothorax less than 5%.     0.9 cm of space between the pleural reflection and the chest wall  at the right lung base. Similar size to prior examination August 7, 2017.   Clearing of discoid infiltrative changes left lung base. Partial  clearing of discoid infiltrative, atelectatic changes right lung  base.      Impression:       CONCLUSION: Small right-sided pneumothorax unchanged since prior  examination. Clearing of discoid atelectatic changes at left lung  base. Partial clearing of discoid atelectatic changes right lung  base.    Electronically signed by:  Nickolas Gonzales MD  8/8/2017 8:36 AM CDT  Workstation: [id number]           MEDICATIONS      cyclobenzaprine 10 mg Oral BID   docusate sodium 100 mg Oral BID   famotidine 20 mg Oral BID   furosemide 20 mg Intravenous Q12H   midodrine 10 mg Oral TID AC   nicotine 1 patch Transdermal Q24H   pantoprazole 40 mg Oral QAM   propranolol 20 mg Oral Q8H   sodium chloride 2 g Oral TID With Meals   spironolactone 25 mg Oral Daily   venlafaxine 12.5 mg Oral QAM   Vitamin D3 50,000  Units Oral Once per day on Mon Thu       norepinephrine 0.02-0.3 mcg/kg/min       Assessment/Plan   ASSESSMENT / PLAN    Principal Problem:    Alcoholic cirrhosis of liver with ascites  Active Problems:    Chronic midline low back pain    Chronic pelvic pain in female    Ascites due to alcoholic cirrhosis    Moderate episode of recurrent major depressive disorder    Hypokalemia    Traumatic pneumothorax    Hyponatremia    1.hyponatremia.  This is likely secondary to hypervolemic state in the setting of chronic liver disease.  Also she is on spironolactone which is recently started and that can drop the sodium.  She is also on mirtazapine and effexor which can cause SIADH.     Stop effexor after todays dose. Na is stable. Keep lasix and aldactone. Lower salt tab to 1 gm tid. tsh and uric acid normal .     2.alcoholic liver cirrhosis with marked ascites recent paracentesis about a month ago.  Patient has now abdominal distention with left lower chest pain.  s/p paracentesis had 10 L removed. Keep midodrine. Keep lasix and aldactone.      3.chronic back pain/depression onultram po and iv morphine. Avoid nsaids due to risk of variceal bleeding    4. Anemia s/p  2 U PRBC    5. Hypokalemia replace mg, on aldactone, give kcl 20 meq x 1 today.     6. Hypocalcemia with low albumin - vitamin d deficiency on replacement              This document has been electronically signed by Roc Barnes MD on August 9, 2017 8:09 AM

## 2017-08-09 NOTE — SIGNIFICANT NOTE
08/09/17 1521   Rehab Evaluation   Evaluation Not Performed other (see comments)  (Pt unresponsive.)

## 2017-08-09 NOTE — PLAN OF CARE
Problem: Patient Care Overview (Adult)  Goal: Plan of Care Review  Outcome: Ongoing (interventions implemented as appropriate)    08/09/17 0607   Coping/Psychosocial Response Interventions   Plan Of Care Reviewed With patient   Patient Care Overview   Progress no change   Outcome Evaluation   Outcome Summary/Follow up Plan patient was restless throughout the beginning of shift - excessive movement, pulling at things, hallcuinating a baby. Administered PRN ativan - effective. Patient has been able to sleep several hours tonight. Patient unable to state where she is or why - but able to state her name. Vitals were stable, urine output less than adequate. diet remains very poor - will not eat but will drink fluids provided. CIWA scale charted       Goal: Adult Individualization and Mutuality  Outcome: Ongoing (interventions implemented as appropriate)  Goal: Discharge Needs Assessment  Outcome: Ongoing (interventions implemented as appropriate)    Problem: Pressure Ulcer Risk (Sean Scale) (Adult,Obstetrics,Pediatric)  Goal: Identify Related Risk Factors and Signs and Symptoms  Outcome: Outcome(s) achieved Date Met:  08/09/17  Goal: Skin Integrity  Outcome: Ongoing (interventions implemented as appropriate)    Problem: Fall Risk (Adult)  Goal: Identify Related Risk Factors and Signs and Symptoms  Outcome: Outcome(s) achieved Date Met:  08/09/17  Goal: Absence of Falls  Outcome: Ongoing (interventions implemented as appropriate)

## 2017-08-09 NOTE — PLAN OF CARE
Problem: Patient Care Overview (Adult)  Goal: Plan of Care Review  Outcome: Ongoing (interventions implemented as appropriate)    08/09/17 1324   Coping/Psychosocial Response Interventions   Plan Of Care Reviewed With caregiver   Patient Care Overview   Progress declining   Outcome Evaluation   Outcome Summary/Follow up Plan Negligible po intake continues with poor appetite and poor po tolerance. Pt may need nutrition support if appropriate. Per staff family meeting planned.          Problem: Liver Failure, Acute/Chronic (Adult)  Goal: Signs and Symptoms of Listed Potential Problems Will be Absent or Manageable (Liver Failure, Acute/Chronic)  Outcome: Ongoing (interventions implemented as appropriate)    08/09/17 1324   Liver Failure, Acute/Chronic   Problems Assessed (Liver Failure, Acute/Chronic) malnutrition   Problems Present (Liver Failure, Acute/Chronic) malnutrition
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
## 2017-08-10 NOTE — PLAN OF CARE
Problem: Patient Care Overview (Adult)  Goal: Plan of Care Review  Outcome: Ongoing (interventions implemented as appropriate)    08/10/17 1521   Coping/Psychosocial Response Interventions   Plan Of Care Reviewed With caregiver;patient   Patient Care Overview   Progress improving   Outcome Evaluation   Outcome Summary/Follow up Plan Pt is more alert this am but appears very weak. she is drinking some of the Ensure. Still with nausea but no emesis today. Rd will monitor.

## 2017-08-10 NOTE — PLAN OF CARE
Problem: Patient Care Overview (Adult)  Goal: Plan of Care Review  Outcome: Ongoing (interventions implemented as appropriate)    08/10/17 1256   Coping/Psychosocial Response Interventions   Plan Of Care Reviewed With patient   Outcome Evaluation   Outcome Summary/Follow up Plan PT evaluation completed today. Pt presents with decreased strength, balance, endurance, cognition and requires assistance with all funcitonal mobility. She will benefit from cont skilled PT to increase ability to peform bed mobility, transfers and gait to best of her tolerance. She will likely require placement for further rehab prior to d/c home. At this time SNF is most appropriate 2* inability to participate in 2-3 hours of therapy a day.          Problem: Inpatient Physical Therapy  Goal: Bed Mobility Goal STG- PT  Outcome: Ongoing (interventions implemented as appropriate)    08/10/17 1256   Bed Mobility PT STG   Bed Mobility PT STG, Date Established 08/10/17   Bed Mobility PT STG, Time to Achieve 4 days   Bed Mobility PT STG, Activity Type supine to sit/sit to supine   Bed Mobility PT STG, [place] Level supervision required   Transfer Training Goal, Assist Device bed rails       Goal: Transfer Training Goal 1 LTG- PT  Outcome: Ongoing (interventions implemented as appropriate)    08/10/17 1256   Transfer Training PT LTG   Transfer Training PT LTG, Date Established 08/10/17   Transfer Training PT LTG, Time to Achieve 2 wks   Transfer Training PT LTG, Activity Type bed to chair /chair to bed;sit to stand/stand to sit;toilet   Transfer Training PT LTG, [place] Level supervision required       Goal: Gait Training Goal LTG- PT  Outcome: Ongoing (interventions implemented as appropriate)    08/10/17 1256   Gait Training PT LTG   Gait Training Goal PT LTG, Date Established 08/10/17   Gait Training Goal PT LTG, Time to Achieve 2 wks   Gait Training Goal PT LTG, [place] Level supervision required   Gait Training Goal PT LTG,  Assist Device walker, rolling   Gait Training Goal PT LTG, Distance to Achieve 150 ft

## 2017-08-10 NOTE — PLAN OF CARE
Problem: Patient Care Overview (Adult)  Goal: Plan of Care Review  Outcome: Ongoing (interventions implemented as appropriate)    08/10/17 1541   Coping/Psychosocial Response Interventions   Plan Of Care Reviewed With patient   Outcome Evaluation   Outcome Summary/Follow up Plan Pt very lethargic but agreed to bed eval. Pt UE strength is grossly 3+/5 bilaterally. She would benefit from OT services to increase independence with ADLs and functional mobility/transfers.       Goal: Discharge Needs Assessment  Outcome: Ongoing (interventions implemented as appropriate)    08/10/17 1541   Discharge Needs Assessment   Discharge Facility/Level Of Care Needs home with home health         Problem: Inpatient Occupational Therapy  Goal: Transfer Training Goal 1 LTG- OT  Outcome: Ongoing (interventions implemented as appropriate)    08/10/17 1541   Transfer Training OT LTG   Transfer Training OT LTG, Date Established 08/10/17   Transfer Training OT LTG, Time to Achieve by discharge   Transfer Training OT LTG, Activity Type all transfers   Transfer Training OT LTG, [place] Level supervision required   Transfer Training OT LTG, Assist Device walker, rolling       Goal: Strength Goal LTG- OT  Outcome: Ongoing (interventions implemented as appropriate)    08/10/17 1541   Strength OT LTG   Strength Goal OT LTG, Date Established 08/10/17   Strength Goal OT LTG, Time to Achieve by discharge   Strength Goal OT LTG, Measure to Achieve 1-2 sets of 10 reps all planes with 1-2 lb wrist wts or dumbells to increase strength in B UEs.       Goal: Static Standing Balance Goal LTG- OT  Outcome: Ongoing (interventions implemented as appropriate)    08/10/17 1541   Static Standing Balance OT LTG   Static Standing Balance OT LTG, Date Established 08/10/17   Static Standing Balance OT LTG, Time to Achieve by discharge   Static Standing Balance OT LTG, [place] Level conditional independence  (5 minutes with functional activity.)   Static  Standing Balance OT LTG, Assist Device assistive device  (Rolling walker.)       Goal: ADL Goal LTG- OT  Outcome: Ongoing (interventions implemented as appropriate)    08/10/17 1541   ADL OT LTG   ADL OT LTG, Date Established 08/10/17   ADL OT LTG, Time to Achieve by discharge   ADL OT LTG, Activity Type ADL skills  (Sponge bath and dress or walk-in shower.)   ADL OT LTG, [place] Level modified independent;assistive device  (rolling walker if needed.)

## 2017-08-10 NOTE — CONSULTS
Adult Nutrition  Assessment    Patient Name:  Joseline Parnell  YOB: 1980  MRN: [id number]  Admit Date:  8/1/2017    Assessment Date:  8/10/2017          Reason for Assessment       08/10/17 1515    Reason for Assessment    Reason For Assessment/Visit follow up protocol              Nutrition/Diet History       08/10/17 1515    Nutrition/Diet History    Typical Food/Fluid Intake Pt awake but appears very fatigued.  SHe only talks in a whisper.  She said that she was going to eat some cream of wheat.  SHe does drink the Ensure.  Still with nausea              Labs/Tests/Procedures/Meds       08/10/17 1516    Labs/Tests/Procedures/Meds    Labs/Tests Review Reviewed    Medication Review Reviewed, pertinent            Physical Findings       08/10/17 1516    Physical Appearance    Overall Physical Appearance loss of muscle mass;loss of subcutaneous fat    Gastrointestinal ascites;abdominal distention              Nutrition Prescription Ordered       08/10/17 1516    Nutrition Prescription PO    Current PO Diet Regular    Fluid Consistency Thin    Supplement Ensure Enlive;Ice Cream    Supplement Frequency 2 times a day;3 times a day    Common Modifiers Cardiac;Consistent Carbohydrate            Evaluation of Received Nutrient/Fluid Intake       08/10/17 1519    PO Evaluation    Number of Days PO Intake Evaluated Insufficient Data    Number of Meals 1    % PO Intake 25%            Comments:  Pt more alert and taking some po.  She is taking some of the Ensure and ate ~25% of her supper last night.  However, she still appears very weak.  Spoke with MD regarding my concerns.  He said that he did speak to the family and told them that if she didn't start eating to improve her nutritional status that she would need to be made comfort care.  Informed him that I would continue to follow.         Electronically signed by:  Akosua Motley RD  08/10/17 3:25 PM

## 2017-08-10 NOTE — MEDICAL STUDENT
"[facility]  [facility] [place]. [address]  T - [phone]  F - [phone]     Progress Note          PATIENT  DEMOGRAPHICS   PATIENT NAME: Joseline Parnell                      PHYSICIAN: Roc Barnes MD  : 1980  MRN: [id number]   LOS: 8 days    Patient Care Team:  ERASMO Alvarez as PCP - General (Family Medicine)  Subjective   SUBJECTIVE   Patient is more alert today and doesn't seem confused. C/o gas pains in the abdomen.          Objective   OBJECTIVE   Vital Signs  Temp:  [97.8 °F (36.6 °C)-99 °F (37.2 °C)] 99 °F (37.2 °C)  Heart Rate:  [102-122] 116  Resp:  [13-22] 15  BP: ()/(55-89) 106/76    Flowsheet Rows         First Filed Value    Admission Height  65\" (165.1 cm) Documented at 2017 0046    Admission Weight  120 lb (54.4 kg) Documented at 2017 0046           I/O last 3 completed shifts:  In: 1194 [P.O.:730; I.V.:364; IV Piggyback:100]  Out: 1273 [Urine:1273]    PHYSICAL EXAM    Physical Exam   Constitutional: She appears cachectic. She is cooperative. She appears ill.   HENT:   Head: Normocephalic and atraumatic.   Eyes: Lids are normal. Right eye exhibits nystagmus. Left eye exhibits nystagmus.   Neck: No rigidity.   Cardiovascular: Regular rhythm, S1 normal, S2 normal and normal heart sounds.  Tachycardia present.    Pulmonary/Chest: Effort normal and breath sounds normal. No stridor.   Abdominal: Bowel sounds are normal. She exhibits distension and ascites. There is tenderness.   Neurological: She is alert.   Skin: Skin is warm and dry.   Psychiatric: Her speech is normal. She is withdrawn. She exhibits a depressed mood.       RESULTS   Results Review:      Results from last 7 days  Lab Units 08/10/17  0354 17  1605 17  0228 17  0435   SODIUM mmol/L 132*  --  133* 132*   POTASSIUM mmol/L 3.7 4.1 3.4* 3.5   CHLORIDE mmol/L 95  --  97 96   CO2 mmol/L 31.0  --  31.0 30.0   BUN mg/dL 26*  --  24* 18   CREATININE mg/dL " 0.58  --  0.50 0.51   CALCIUM mg/dL 7.6*  --  7.8* 7.6*   BILIRUBIN mg/dL 2.0*  --  1.0 1.2   ALK PHOS U/L 92  --  81 87   ALT (SGPT) U/L 42  --  48 57*   AST (SGOT) U/L 14  --  18 34   GLUCOSE mg/dL 73  --  99 116*       Estimated Creatinine Clearance: 101.7 mL/min (by C-G formula based on Cr of 0.58).      Results from last 7 days  Lab Units 08/10/17  0354 08/09/17  0228 08/08/17  0435   MAGNESIUM mg/dL 2.0 1.5* 1.6               Results from last 7 days  Lab Units 08/09/17  0228 08/08/17  0435 08/08/17  0001 08/07/17  1607 08/07/17  1220   WBC 10*3/mm3 6.09 5.98 6.07 6.55 6.32   HEMOGLOBIN g/dL 8.6* 9.0* 9.0* 10.2* 8.9*   PLATELETS 10*3/mm3 27* 31* 34* 35* 31*               Imaging Results (last 24 hours)     Procedure Component Value Units Date/Time    XR Chest 1 View [[id number]] Collected:  08/09/17 0950     Updated:  08/09/17 1027    Narrative:       [facility]    Patient Name: KAIDEN ALONSO    ORDERING: COLEMAN ZAMARRIPA     ATTENDING: GENESIS NGUYEN     REFERRING: COLEMAN ZAMARRIPA    -----------------------    PROCEDURE: Portable chest x-ray    TECHNIQUE: Single AP view of the chest    COMPARISON: 8/8/2017    HISTORY: Pneumothorax, K70.31 Alcoholic cirrhosis of liver with  ascites E87.1 Hypo-osmolality and hyponatremia E16.2  Hypoglycemia, unspecified D64.9 Anemia, unspecified E87.3  Alkalosis    FINDINGS:     Life-support devices: Right-sided central venous catheter  terminates in the region of the SVC.    Lungs/pleura: There is a small persistent right-sided  pneumothorax, mostly visualized lateral portion of the right  lower lung field, unchanged in size compared to prior study.  Lungs otherwise appear clear.    Heart, hilar and mediastinal structures:  Normal accounting for  projection and technique      Impression:       CONCLUSION:  There is a small persistent right-sided pneumothorax, mostly  visualized lateral portion of the right lower lung field,  unchanged in size  compared to prior study.    Electronically signed by:  Rakan Moraes MD  8/9/2017 10:26 AM CDT  Workstation: [id number]           MEDICATIONS      bacitracin  Topical Q12H   cyclobenzaprine 10 mg Oral BID   docusate sodium 100 mg Oral BID   famotidine 20 mg Oral BID   furosemide 20 mg Intravenous Q12H   midodrine 10 mg Oral TID AC   nicotine 1 patch Transdermal Q24H   pantoprazole 40 mg Oral QAM   propranolol 20 mg Oral Q8H   sodium chloride 1 g Oral TID With Meals   spironolactone 25 mg Oral Daily   venlafaxine 12.5 mg Oral QAM   Vitamin D3 50,000 Units Oral Once per day on Mon Thu       norepinephrine 0.02-0.3 mcg/kg/min       Assessment/Plan   ASSESSMENT / PLAN    Principal Problem:    Alcoholic cirrhosis of liver with ascites  Active Problems:    Chronic midline low back pain    Chronic pelvic pain in female    Ascites due to alcoholic cirrhosis    Moderate episode of recurrent major depressive disorder    Hypokalemia    Traumatic pneumothorax    Hyponatremia    Hypomagnesemia    Thrombocytopenia    1.hyponatremia.  This is likely secondary to hypervolemic state in the setting of chronic liver disease.  Also she is on spironolactone which is recently started and that can drop the sodium.  She is also on mirtazapine and effexor which can cause SIADH.      Stop effexor after todays dose. Na is stable. Keep lasix and aldactone. On salt tab to 1 gm tid. tsh and uric acid normal .      2.alcoholic liver cirrhosis with marked ascites recent paracentesis about a month ago.  Patient has now abdominal distention with left lower chest pain.  s/p paracentesis had 10 L removed. Keep midodrine. Keep lasix and aldactone.       3.chronic back pain/depression on ultram po and iv morphine. Avoid nsaids due to risk of variceal bleeding     4. Anemia- received 2 U PRBC. Hgb was 8.6 yesterday.      5. Hypokalemia Magnesium was 1.5 yesterday; supplemented with 1g and it has normalized to 2.0 today. On aldactone, and received KCl 2 days  ago. Potassium has normalized to 3.7 today.      6. Hypocalcemia with low albumin - vitamin d deficiency on replacement                This document has been electronically signed by Roc Barnes MD on August 10, 2017 9:38 AM
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
## 2017-08-10 NOTE — PLAN OF CARE
Problem: Patient Care Overview (Adult)  Goal: Plan of Care Review  Outcome: Ongoing (interventions implemented as appropriate)  Potassium and magnesium replaced today. Patient very drowsy for most of the day alert this afternoon with family present and ate soup and took medications.  spoke with family and patient about her condition.Vitals stable throughout the day.    08/09/17 1914   Coping/Psychosocial Response Interventions   Plan Of Care Reviewed With patient;mother;sibling   Patient Care Overview   Progress declining         Problem: Liver Failure, Acute/Chronic (Adult)  Goal: Signs and Symptoms of Listed Potential Problems Will be Absent or Manageable (Liver Failure, Acute/Chronic)  Outcome: Ongoing (interventions implemented as appropriate)    Problem: Pressure Ulcer Risk (Sean Scale) (Adult,Obstetrics,Pediatric)  Goal: Skin Integrity  Outcome: Ongoing (interventions implemented as appropriate)    Problem: Fall Risk (Adult)  Goal: Absence of Falls  Outcome: Ongoing (interventions implemented as appropriate)

## 2017-08-10 NOTE — PROGRESS NOTES
MEDICINE DAILY PROGRESS NOTE  NAME: Joseline Parnell  : 1980  MRN: [id number]     LOS: 8 days     PROVIDER OF SERVICE: Luis F Jalloh MD    Chief Complaint: Alcoholic cirrhosis of liver with ascites    Subjective:     Interval History:  History taken from: patient chart RN   Patient awake and alert this morning.  Attempting to eat breakfast.  Patient feeling some better.    Review of Systems:   Review of Systems   Constitutional: Positive for activity change and fatigue. Negative for appetite change and fever.   HENT: Negative for ear pain and sore throat.    Eyes: Negative for pain and visual disturbance.   Respiratory: Negative for cough and shortness of breath.    Cardiovascular: Negative for chest pain and palpitations.   Gastrointestinal: Positive for abdominal distention. Negative for abdominal pain and nausea.   Endocrine: Negative for cold intolerance and heat intolerance.   Genitourinary: Negative for difficulty urinating and dysuria.   Musculoskeletal: Negative for arthralgias and gait problem.   Skin: Negative for color change and rash.   Neurological: Positive for weakness. Negative for dizziness and headaches.   Hematological: Negative for adenopathy. Does not bruise/bleed easily.   Psychiatric/Behavioral: Positive for confusion. Negative for agitation and sleep disturbance.       Objective:     Vital Signs  Vitals:    08/10/17 1002 08/10/17 1016 08/10/17 1102 08/10/17 1109   BP: 100/71  110/81    BP Location:       Patient Position:       Pulse:  110  119   Resp:       Temp:       TempSrc:       SpO2:  100%  99%   Weight:       Height:           Physical Exam  Physical Exam   Constitutional: She is oriented to person, place, and time. She appears well-developed. She appears cachectic. No distress.   HENT:   Head: Normocephalic and atraumatic.   Right Ear: External ear normal.   Left Ear: External ear normal.   Nose: Nose normal.   Eyes: Pupils are equal, round, and reactive to light.   Neck: Normal  range of motion. Neck supple.   Cardiovascular: Normal rate, regular rhythm and normal heart sounds.  Exam reveals no gallop and no friction rub.    No murmur heard.  Pulmonary/Chest: Effort normal and breath sounds normal. No respiratory distress. She has no wheezes. She has no rales. She exhibits no tenderness.   Abdominal: Soft. Bowel sounds are normal. She exhibits distension. She exhibits no mass. There is no tenderness. There is no rebound and no guarding.   Neurological: She is alert and oriented to person, place, and time.   Skin: She is not diaphoretic.       Medication Review    Current Facility-Administered Medications:   •  acetaminophen (TYLENOL) tablet 650 mg, 650 mg, Oral, Q4H PRN, Mo Garcia MD  •  bacitracin ointment, , Topical, Q12H, Luis F Jalloh MD  •  bisacodyl (DULCOLAX) suppository 10 mg, 10 mg, Rectal, Daily PRN, Mo Garcia MD  •  cyclobenzaprine (FLEXERIL) tablet 10 mg, 10 mg, Oral, BID, Mo Garcia MD, 10 mg at 08/10/17 0910  •  dextrose (D50W) solution 50 mL, 50 mL, Intravenous, Q1H PRN, Diogo Dickerson MD, 50 mL at 08/02/17 0034  •  docusate sodium (COLACE) capsule 100 mg, 100 mg, Oral, BID, Mo Garcia MD, 100 mg at 08/09/17 1734  •  famotidine (PEPCID) tablet 20 mg, 20 mg, Oral, BID, Mo Garcia MD, 20 mg at 08/09/17 1729  •  furosemide (LASIX) injection 20 mg, 20 mg, Intravenous, Q12H, Roc Barnes MD, 20 mg at 08/10/17 0904  •  hydrALAZINE (APRESOLINE) injection 10 mg, 10 mg, Intravenous, Q6H PRN, Mo Garcia MD  •  ibuprofen (ADVIL,MOTRIN) tablet 600 mg, 600 mg, Oral, Q6H PRN, Lobo Gu MD  •  loperamide (IMODIUM) capsule 2 mg, 2 mg, Oral, TID PRN, Roc Barnes MD, 2 mg at 08/08/17 0915  •  LORazepam (ATIVAN) tablet 0.5 mg, 0.5 mg, Oral, Q2H PRN **OR** LORazepam (ATIVAN) injection 0.5 mg, 0.5 mg, Intravenous, Q2H PRN **OR** LORazepam (ATIVAN) tablet 1 mg, 1 mg, Oral, Q1H PRN **OR** LORazepam (ATIVAN)  injection 1 mg, 1 mg, Intravenous, Q1H PRN **OR** LORazepam (ATIVAN) injection 1 mg, 1 mg, Intravenous, Q15 Min PRN **OR** LORazepam (ATIVAN) injection 1 mg, 1 mg, Intramuscular, Q15 Min PRN **OR** LORazepam (ATIVAN) tablet 2 mg, 2 mg, Oral, Q1H PRN **OR** LORazepam (ATIVAN) injection 2 mg, 2 mg, Intravenous, Q1H PRN, Lobo Gu MD, 2 mg at 08/06/17 0844  •  LORazepam (ATIVAN) injection 1 mg, 1 mg, Intravenous, Q6H PRN, Mo Garcia MD, 1 mg at 08/08/17 2222  •  midodrine (PROAMATINE) tablet 10 mg, 10 mg, Oral, TID AC, Roc Barnes MD, 10 mg at 08/10/17 0917  •  Morphine sulfate (PF) injection 1 mg, 1 mg, Intravenous, Q4H PRN, Roc Barnes MD, 1 mg at 08/10/17 1014  •  nicotine (NICODERM CQ) 21 MG/24HR patch 1 patch, 1 patch, Transdermal, Q24H, Mo Garcia MD, 1 patch at 08/10/17 0929  •  norepinephrine (LEVOPHED) 8,000 mcg in sodium chloride 0.9 % 250 mL (32 mcg/mL) infusion, 0.02-0.3 mcg/kg/min, Intravenous, Titrated, Lobo Gu MD  •  ondansetron (ZOFRAN) tablet 4 mg, 4 mg, Oral, Q6H PRN **OR** ondansetron ODT (ZOFRAN-ODT) disintegrating tablet 4 mg, 4 mg, Oral, Q6H PRN **OR** ondansetron (ZOFRAN) injection 4 mg, 4 mg, Intravenous, Q6H PRN, Mo Garcia MD, 4 mg at 08/10/17 0923  •  pantoprazole (PROTONIX) EC tablet 40 mg, 40 mg, Oral, QAM, Mo Garcia MD, 40 mg at 08/10/17 0911  •  potassium chloride (MICRO-K) CR capsule 40 mEq, 40 mEq, Oral, PRN, 40 mEq at 08/05/17 1755 **OR** potassium chloride (KLOR-CON) packet 40 mEq, 40 mEq, Oral, PRN **OR** potassium chloride 10 mEq in 100 mL IVPB, 10 mEq, Intravenous, Q1H PRN, Mo Garcia MD, Last Rate: 100 mL/hr at 08/07/17 1141, 10 mEq at 08/07/17 1141  •  potassium chloride 10 mEq in 100 mL IVPB, 10 mEq, Intravenous, Q1H PRN, Lobo Gu MD, Last Rate: 100 mL/hr at 08/07/17 1039, 10 mEq at 08/07/17 1039  •  potassium chloride 20 mEq in 50 mL IVPB, 20 mEq, Intravenous, Q1H PRN, Roc Barnes,  MD, Last Rate: 50 mL/hr at 08/09/17 1038, 20 mEq at 08/09/17 1038  •  propranolol (INDERAL) tablet 20 mg, 20 mg, Oral, Q8H, Lobo Gu MD, 20 mg at 08/09/17 2254  •  sodium chloride 0.9 % flush 1-10 mL, 1-10 mL, Intravenous, PRN, Mo Garcia MD, 10 mL at 08/10/17 0926  •  Insert peripheral IV, , , Once **AND** sodium chloride 0.9 % flush 10 mL, 10 mL, Intravenous, PRN, Diogo Dickerson MD, 10 mL at 08/09/17 2007  •  sodium chloride tablet 1 g, 1 g, Oral, TID With Meals, Roc Barnes MD, 1 g at 08/09/17 1730  •  spironolactone (ALDACTONE) tablet 25 mg, 25 mg, Oral, Daily, Roc Barnes MD, 25 mg at 08/10/17 0911  •  traMADol (ULTRAM) tablet 50 mg, 50 mg, Oral, Q8H PRN, Roc Barnes MD, 50 mg at 08/07/17 2012  •  Vitamin D3 50,000 Units, 50,000 Units, Oral, Once per day on Mon Thu, Roc Barnes MD, 50,000 Units at 08/10/17 0916  •  zolpidem (AMBIEN) tablet 5 mg, 5 mg, Oral, Nightly PRN, Roc Barnes MD, 5 mg at 08/07/17 2133     Diagnostic Data    Lab Results (last 24 hours)     Procedure Component Value Units Date/Time    Potassium [[id number]]  (Normal) Collected:  08/09/17 1605    Specimen:  Blood Updated:  08/09/17 1630     Potassium 4.1 mmol/L     Comprehensive Metabolic Panel [[id number]]  (Abnormal) Collected:  08/10/17 0354    Specimen:  Blood Updated:  08/10/17 0447     Glucose 73 mg/dL      BUN 26 (H) mg/dL      Creatinine 0.58 mg/dL      Sodium 132 (L) mmol/L      Potassium 3.7 mmol/L      Chloride 95 mmol/L      CO2 31.0 mmol/L      Calcium 7.6 (L) mg/dL      Total Protein 4.6 (L) g/dL      Albumin 1.90 (L) g/dL      ALT (SGPT) 42 U/L      AST (SGOT) 14 U/L      Alkaline Phosphatase 92 U/L      Total Bilirubin 2.0 (H) mg/dL      eGFR   Amer 141 mL/min/1.73      Globulin 2.7 gm/dL      A/G Ratio 0.7 (L) g/dL      BUN/Creatinine Ratio 44.8 (H)     Anion Gap 6.0 mmol/L     Magnesium [[id number]]  (Normal) Collected:  08/10/17 0354    Specimen:  Blood Updated:  08/10/17 0450      Magnesium 2.0 mg/dL         I reviewed the patient's new clinical results.    Assessment/Plan:     Active Hospital Problems (** Indicates Principal Problem)    Diagnosis Date Noted   • **Alcoholic cirrhosis of liver with ascites [K70.31] 07/19/2017     CIWA.  GI following.  Await recommendations.     • Hypomagnesemia [E83.42] 08/09/2017     Replace.  Recheck     • Thrombocytopenia [D69.6] 08/09/2017     08/10/17.  CBC pending today.  Following platelets.    Secondary to cirrhosis.  Following platelets.  Been stable around 30.      Results from last 7 days  Lab Units 08/09/17  0228 08/08/17  0435 08/08/17  0001 08/07/17  1607 08/07/17  1220 08/07/17  0526 08/06/17  1831   PLATELETS 10*3/mm3 27* 31* 34* 35* 31* 42* 33*          • Hypokalemia [E87.6] 08/07/2017     08/10/17.  At goal today.  Monitor.    08/09/17.  Replace today.  Magnesium low today.  Replace both.  Recheck.  Monitor.    08/08/17.  At goal today.  Monitor.    Replace.  Recheck.  Magnesium at 1.7 mg/dL (Normal)     • Traumatic pneumothorax [S27.0XXA] 08/07/2017     08/10/17.  Persistent but small.  X-ray from today pending.    08/09/17.  X-ray pending.  Follow x-ray.  No respiratory distress.    08/08/17.  Improving.  Almost resolved per x-ray.  Will continue to monitor.    Imaging Results (last 24 hours)     Procedure Component Value Units Date/Time    XR Chest 1 View [[id number]] Collected:  08/07/17 1823     Updated:  08/07/17 1835    Narrative:         EXAM:         Radiograph(s), Chest   VIEWS:   Portable ; 1       DATE/TIME: 8/7/2017 6:32 PM CDT               INDICATION:     Pneumothorax follow-up      COMPARISON:   CXR: 8/7/17 at 04:14 hours          FINDINGS:           - lines/tubes:     Right approach central venous catheter,  unchanged.     - cardiac:          size within normal limits         - mediastinum:  contour within normal limits         - lungs:          no focal air space process, pulmonary  interstitial edema, nodule(s)/mass              - pleura:          Previously described right-sided pneumothorax  has decreased in size with a small residual pneumothorax  remaining inferiorly/laterally on the right.                  - osseous:          unremarkable for age                  - misc.:        Impression:       CONCLUSION:        1. Interval decrease in size of the right pneumothorax.    Maintain short interval radiographic follow-up to document  resolution.                      Electronically signed by:  SHAR Melendrez MD  8/7/2017 6:34 PM  CDT Workstation: [id number]    XR Chest 1 View [[id number]] Collected:  08/08/17 0816     Updated:  08/08/17 0837    Narrative:       Chest single view.     CLINICAL INDICATION: Shortness of breath. Right-sided  pneumothorax, follow-up.    COMPARISON: Chest x-ray August 7, 2017.    FINDINGS: Cardiac silhouette is normal in size. Pulmonary  vascularity is unremarkable.     Small right-sided pneumothorax less than 5%.     0.9 cm of space between the pleural reflection and the chest wall  at the right lung base. Similar size to prior examination August 7, 2017.   Clearing of discoid infiltrative changes left lung base. Partial  clearing of discoid infiltrative, atelectatic changes right lung  base.      Impression:       CONCLUSION: Small right-sided pneumothorax unchanged since prior  examination. Clearing of discoid atelectatic changes at left lung  base. Partial clearing of discoid atelectatic changes right lung  base.    Electronically signed by:  Nickolas Gonzales MD  8/8/2017 8:36 AM CDT  Workstation: [id number]            Stable on x-ray.  Dr. Wang discussed case with CT surgery yesterday.  Monitoring.  Chest tube as needed with desaturation or worsening pneumothorax on x-ray.     • Hyponatremia [E87.1] 08/07/2017     Nephrology following.     • Ascites due to alcoholic cirrhosis [K70.31] 07/19/2017     GI following.  Await recommendations.     • Moderate episode of recurrent major depressive disorder  [F33.1] 07/19/2017   • Chronic midline low back pain [M54.5, G89.29] 01/17/2017   • Chronic pelvic pain in female [R10.2, G89.29] 01/17/2017      Resolved Hospital Problems    Diagnosis Date Noted Date Resolved   No resolved problems to display.   Mentation better over the last few days.  Attempting to increase nutrition.    Will monitor patient's hospital course and adjust treatment as hospital course dictates.    DVT prophylaxis: SCD/TEDs  Code status is Full Code    Plan for disposition:Unknown      Time:           This document has been electronically signed by Luis F Jalloh MD on August 10, 2017 11:28 AM

## 2017-08-11 NOTE — PLAN OF CARE
Problem: Patient Care Overview (Adult)  Goal: Plan of Care Review  Outcome: Ongoing (interventions implemented as appropriate)    08/11/17 0552   Coping/Psychosocial Response Interventions   Plan Of Care Reviewed With patient   Patient Care Overview   Progress no change   Outcome Evaluation   Outcome Summary/Follow up Plan Patient confused throughout the night, patient climbed over siderail and fell out of bed, no obvious signs of injury, MD notified, was able to eat 15% of dinner however experienced nausea and vomiting shortly after.         Problem: Liver Failure, Acute/Chronic (Adult)  Goal: Signs and Symptoms of Listed Potential Problems Will be Absent or Manageable (Liver Failure, Acute/Chronic)  Outcome: Ongoing (interventions implemented as appropriate)    Problem: Pressure Ulcer Risk (Sean Scale) (Adult,Obstetrics,Pediatric)  Goal: Skin Integrity  Outcome: Ongoing (interventions implemented as appropriate)    Problem: Fall Risk (Adult)  Goal: Absence of Falls  Outcome: Unable to achieve outcome(s) by discharge Date Met:  08/11/17

## 2017-08-11 NOTE — SIGNIFICANT NOTE
08/11/17 1109   Rehab Treatment   Discipline physical therapy assistant   Treatment Not Performed other (see comments)  (nursing deferred tx this date due to pt vomiting all morning. Nursing request that therapy check back this weekend)

## 2017-08-11 NOTE — PROGRESS NOTES
"[facility]  [address] [place] [address]. [id number]  T - [phone]  F - [phone]     Progress Note          PATIENT  DEMOGRAPHICS   PATIENT NAME: Joseline Parnell                      PHYSICIAN: Ryne Stark MD  : 1980  MRN: [id number]   LOS: 9 days    Patient Care Team:  ERASMO Alvarez as PCP - General (Family Medicine)  Subjective   SUBJECTIVE   Her mental status is in and out. C/o pain in her abdomen. Was nauseous yesterday but better today. Blood pressures remain low.          Objective   OBJECTIVE   Vital Signs  Temp:  [98.1 °F (36.7 °C)-98.5 °F (36.9 °C)] 98.5 °F (36.9 °C)  Heart Rate:  [100-128] 100  Resp:  [15-19] 19  BP: ()/(58-85) 102/76    Flowsheet Rows         First Filed Value    Admission Height  65\" (165.1 cm) Documented at 2017 0046    Admission Weight  120 lb (54.4 kg) Documented at 2017 0046           I/O last 3 completed shifts:  In: 365 [P.O.:365]  Out: 1043 [Urine:1043]    PHYSICAL EXAM    Physical Exam   Constitutional: She is oriented to person, place, and time. She appears well-developed.   Poorly nourished   HENT:   Head: Normocephalic and atraumatic.   Dry mucous membranes   Eyes: Right eye exhibits no discharge. Left eye exhibits no discharge. No scleral icterus.   Neck: Neck supple.   Cardiovascular: Regular rhythm and normal heart sounds.  Exam reveals no gallop and no friction rub.    No murmur heard.  Tachycardic    Pulmonary/Chest: Effort normal and breath sounds normal. No respiratory distress. She has no wheezes. She has no rales. She exhibits no tenderness.   Abdominal: Soft. Bowel sounds are normal. She exhibits distension. She exhibits no mass. There is tenderness. There is no guarding.   Musculoskeletal: She exhibits no edema or tenderness.   Neurological: She is alert and oriented to person, place, and time.   Skin: Skin is warm and dry.   Has multiple abrasions of skin   Nursing note and vitals " reviewed.      RESULTS   Results Review:      Results from last 7 days  Lab Units 08/11/17  0406 08/10/17  0354 08/09/17  1605 08/09/17 0228   SODIUM mmol/L 129* 132*  --  133*   POTASSIUM mmol/L 4.0 3.7 4.1 3.4*   CHLORIDE mmol/L 92* 95  --  97   CO2 mmol/L 27.0 31.0  --  31.0   BUN mg/dL 36* 26*  --  24*   CREATININE mg/dL 0.93 0.58  --  0.50   CALCIUM mg/dL 7.6* 7.6*  --  7.8*   BILIRUBIN mg/dL 2.6* 2.0*  --  1.0   ALK PHOS U/L 99 92  --  81   ALT (SGPT) U/L 42 42  --  48   AST (SGOT) U/L 16 14  --  18   GLUCOSE mg/dL 68 73  --  99       Estimated Creatinine Clearance: 61 mL/min (by C-G formula based on Cr of 0.93).      Results from last 7 days  Lab Units 08/11/17  0406 08/10/17  0354 08/09/17  0228   MAGNESIUM mg/dL 1.8 2.0 1.5*       Results from last 7 days  Lab Units 08/11/17  0406 08/10/17  1839 08/09/17  0228 08/08/17  0435 08/08/17  0001   WBC 10*3/mm3 10.68* 9.21 6.09 5.98 6.07   HEMOGLOBIN g/dL 8.4* 8.5* 8.6* 9.0* 9.0*   PLATELETS 10*3/mm3 30* 31* 27* 31* 34*       Imaging Results (last 24 hours)     Procedure Component Value Units Date/Time    XR Chest 1 View [[id number]] Collected:  08/10/17 1906     Updated:  08/10/17 1921    Narrative:         EXAM:         Radiograph(s), Chest   VIEWS:   Portable ; 1       DATE/TIME: 8/10/2017 7:18 PM CDT               INDICATION:     Pneumothorax follow-up      COMPARISON:   CXR: 8/9/17          FINDINGS:           - lines/tubes:     Right approach jugular central venous catheter  in standard position, unchanged.     - cardiac:          size within normal limits         - mediastinum:  contour within normal limits         - lungs:          no focal air space process, pulmonary  interstitial edema, nodule(s)/mass             - pleura:          Minimal right-sided pneumothorax, loculated in  the inferior/lateral aspect, marginally decreased in size.                   - osseous:          unremarkable for age                  - misc.:        Impression:        CONCLUSION:        1. Decreasing minimal right pneumothorax.                      Electronically signed by:  SHAR Melendrez MD  8/10/2017 7:20  PM CDT Workstation: [id number]           MEDICATIONS      bacitracin  Topical Q12H   cyclobenzaprine 10 mg Oral BID   docusate sodium 100 mg Oral BID   famotidine 20 mg Oral BID   furosemide 20 mg Intravenous Q12H   midodrine 10 mg Oral TID AC   nicotine 1 patch Transdermal Q24H   pantoprazole 40 mg Oral QAM   propranolol 20 mg Oral Q8H   sodium chloride 1 g Oral TID With Meals   spironolactone 25 mg Oral Daily   Vitamin D3 50,000 Units Oral Once per day on Mon Thu       norepinephrine 0.02-0.3 mcg/kg/min       Assessment/Plan   ASSESSMENT / PLAN    Principal Problem:    Alcoholic cirrhosis of liver with ascites  Active Problems:    Chronic midline low back pain    Chronic pelvic pain in female    Ascites due to alcoholic cirrhosis    Moderate episode of recurrent major depressive disorder    Hypokalemia    Traumatic pneumothorax    Hyponatremia    Hypomagnesemia    Thrombocytopenia      1. Acute kidney injury: Baseline creatinine 0.4-0.5mg/dl.  - Her creatinine went up to 0.93mg/dl. This is a significant elevation especially in this patient with cirrhosis and very little muscle mass.   - I am concerned that she is not perfusing well with low blood pressure despite midodrine.  - Will hold her lasix and Aldactone for now. Give albumin 25gm TID scheduled. Will need additional albumin supplementation if she undergoes paracentesis.     2. Hyponatremia:  - Sodium decreased to 129 from 132 yesterday. She did not receive salt tabs yesterday due to nausea.   - Will hold diuretics as mentioned above. Off Effexor.      3. Alcoholic liver cirrhosis with marked ascites:  - Last alcohol intake was May/ Ne.   - Dr. You is following. ? If she is a transplant candidate      4. Chronic back pain/depression:  - On ultram po and iv morphine. Avoid nsaids due to risk of variceal  bleeding      5. Anemia:  - Hemoglobin is 8.4, stable      6. Hypocalcemia with low albumin:  - Corrected calcium is acceptable  - Vitamin d deficiency- on replacement           This document has been electronically signed by Ryne Stark MD on August 11, 2017 9:03 AM

## 2017-08-11 NOTE — PLAN OF CARE
Problem: Patient Care Overview (Adult)  Goal: Plan of Care Review  Outcome: Ongoing (interventions implemented as appropriate)    08/11/17 1213   Coping/Psychosocial Response Interventions   Plan Of Care Reviewed With patient;caregiver   Patient Care Overview   Progress no change   Outcome Evaluation   Outcome Summary/Follow up Plan Poor po intake and tolerance. Pt ate ~15% of ehr supper and then had nausea and vomting. She is confused this am.          Problem: Liver Failure, Acute/Chronic (Adult)  Goal: Signs and Symptoms of Listed Potential Problems Will be Absent or Manageable (Liver Failure, Acute/Chronic)  Outcome: Ongoing (interventions implemented as appropriate)

## 2017-08-11 NOTE — SIGNIFICANT NOTE
08/11/17 1439   Rehab Treatment   Discipline occupational therapy assistant   Treatment Not Performed other (see comments)   nursing stated pt not able to be seen for OT this date secondary to pt vomiting all day

## 2017-08-11 NOTE — PLAN OF CARE
Problem: Patient Care Overview (Adult)  Goal: Plan of Care Review  Outcome: Ongoing (interventions implemented as appropriate)  Patient alert this morning took morning medications then became nauseated and  Had some vomiting.  zofran given for N&V.  Patient lethargic throughout rest of day refused to take medications and eat due to nausea. Pain controlled throughout day with morphine.    08/10/17 1942   Coping/Psychosocial Response Interventions   Plan Of Care Reviewed With patient;mother   Patient Care Overview   Progress no change

## 2017-08-11 NOTE — CONSULTS
"Adult Nutrition  Assessment    Patient Name:  Joseline Parnell  YOB: 1980  MRN: [id number]  Admit Date:  8/1/2017    Assessment Date:  8/11/2017          Reason for Assessment       08/11/17 1209    Reason for Assessment    Reason For Assessment/Visit follow up protocol              Nutrition/Diet History       08/11/17 1209    Nutrition/Diet History    Typical Food/Fluid Intake Pt said \"I am confused and can't think\" Still nauseated.               Labs/Tests/Procedures/Meds       08/11/17 1209    Labs/Tests/Procedures/Meds    Labs/Tests Review Reviewed            Physical Findings       08/11/17 1209    Physical Appearance    Overall Physical Appearance loss of subcutaneous fat    Gastrointestinal abdominal distention;ascites;nausea;vomiting              Nutrition Prescription Ordered       08/11/17 1210    Nutrition Prescription PO    Current PO Diet Regular    Fluid Consistency Thin    Supplement Ensure Enlive;Ice Cream    Supplement Frequency 2 times a day;3 times a day    Common Modifiers Fluid Restriction    Fluid Restriction mL per Tray 250 mL    Fluid Restriction mL per Day --   1.4 liters            Evaluation of Received Nutrient/Fluid Intake       08/11/17 1210    PO Evaluation    Number of Days PO Intake Evaluated Insufficient Data    % PO Intake 15% noted ofr supper last night and then pt has emesis            Comments:  Poor po intake continues.  Pt apparently became confused last night and climbed over the railings of her bed and fell.  She ate ~15% of her dinner last night but then had nausea and vomiting.  Taking minimal po despite all efforts.  Will monitor.  MD is aware of poor nutrition status.         Electronically signed by:  Akosua Motley RD  08/11/17 12:14 PM  "

## 2017-08-12 NOTE — PROGRESS NOTES
MEDICINE DAILY PROGRESS NOTE  NAME: Joseline Parnell  : 1980  MRN: [id number]     LOS: 10 days     PROVIDER OF SERVICE: Luis F Jalloh MD    Chief Complaint: Alcoholic cirrhosis of liver with ascites    Subjective:     Interval History:  History taken from: patient chart RN   Patient more alert this morning.  Discussed feeding tube (OG/NG) and patient stated she did not want this.  Nutritional status still poor.    Review of Systems:   Review of Systems   Constitutional: Positive for activity change and fatigue. Negative for appetite change and fever.   HENT: Negative for ear pain and sore throat.    Eyes: Negative for pain and visual disturbance.   Respiratory: Negative for cough and shortness of breath.    Cardiovascular: Negative for chest pain and palpitations.   Gastrointestinal: Positive for abdominal distention. Negative for abdominal pain and nausea.   Endocrine: Negative for cold intolerance and heat intolerance.   Genitourinary: Negative for difficulty urinating and dysuria.   Musculoskeletal: Negative for arthralgias and gait problem.   Skin: Negative for color change and rash.   Neurological: Positive for weakness. Negative for dizziness and headaches.   Hematological: Negative for adenopathy. Does not bruise/bleed easily.   Psychiatric/Behavioral: Positive for confusion. Negative for agitation and sleep disturbance.       Objective:     Vital Signs  Vitals:    17 0510 17 0600 17 0602 17 0620   BP:   98/71    BP Location:       Patient Position:       Pulse: (!) 129   (!) 142   Resp:       Temp:       TempSrc:       SpO2:  100%  98%   Weight:       Height:           Physical Exam  Physical Exam   Constitutional: She is oriented to person, place, and time. She appears well-developed. She appears cachectic. No distress.   HENT:   Head: Normocephalic and atraumatic.   Right Ear: External ear normal.   Left Ear: External ear normal.   Nose: Nose normal.   Eyes: Pupils are equal,  round, and reactive to light.   Neck: Normal range of motion. Neck supple.   Cardiovascular: Normal rate, regular rhythm and normal heart sounds.  Exam reveals no gallop and no friction rub.    No murmur heard.  Pulmonary/Chest: Effort normal and breath sounds normal. No respiratory distress. She has no wheezes. She has no rales. She exhibits no tenderness.   Abdominal: Soft. Bowel sounds are normal. She exhibits distension. She exhibits no mass. There is no tenderness. There is no rebound and no guarding.   Neurological: She is alert and oriented to person, place, and time.   Skin: She is not diaphoretic.   Fluid wave present.    Last 2 weights    08/11/17  0602 08/12/17  0454   Weight: 103 lb 13.4 oz (47.1 kg) 104 lb 15 oz (47.6 kg)       Medication Review    Current Facility-Administered Medications:   •  acetaminophen (TYLENOL) tablet 650 mg, 650 mg, Oral, Q4H PRN, Mo Garcia MD  •  albumin human 25 % IV SOLN 25 g, 25 g, Intravenous, TID, Ryne Stark MD, 25 g at 08/12/17 0930  •  bacitracin ointment, , Topical, Q12H, Luis F aJlloh MD  •  bisacodyl (DULCOLAX) suppository 10 mg, 10 mg, Rectal, Daily PRN, Mo Garcia MD  •  cyclobenzaprine (FLEXERIL) tablet 10 mg, 10 mg, Oral, BID, Mo Garcia MD, 10 mg at 08/12/17 0919  •  dextrose (D50W) solution 50 mL, 50 mL, Intravenous, Q1H PRN, Diogo Dickerson MD, 50 mL at 08/02/17 0034  •  docusate sodium (COLACE) capsule 100 mg, 100 mg, Oral, BID, Mo Garcia MD, 100 mg at 08/12/17 0922  •  famotidine (PEPCID) tablet 20 mg, 20 mg, Oral, BID, Mo Garcia MD, 20 mg at 08/12/17 0919  •  hydrALAZINE (APRESOLINE) injection 10 mg, 10 mg, Intravenous, Q6H PRN, Mo Garcia MD  •  loperamide (IMODIUM) capsule 2 mg, 2 mg, Oral, TID PRN, Roc Barnes MD, 2 mg at 08/10/17 2002  •  LORazepam (ATIVAN) tablet 0.5 mg, 0.5 mg, Oral, Q2H PRN **OR** LORazepam (ATIVAN) injection 0.5 mg, 0.5 mg, Intravenous, Q2H PRN  **OR** LORazepam (ATIVAN) tablet 1 mg, 1 mg, Oral, Q1H PRN **OR** LORazepam (ATIVAN) injection 1 mg, 1 mg, Intravenous, Q1H PRN **OR** LORazepam (ATIVAN) injection 1 mg, 1 mg, Intravenous, Q15 Min PRN **OR** LORazepam (ATIVAN) injection 1 mg, 1 mg, Intramuscular, Q15 Min PRN **OR** LORazepam (ATIVAN) tablet 2 mg, 2 mg, Oral, Q1H PRN **OR** LORazepam (ATIVAN) injection 2 mg, 2 mg, Intravenous, Q1H PRN, Lobo Gu MD, 2 mg at 08/06/17 0844  •  midodrine (PROAMATINE) tablet 10 mg, 10 mg, Oral, TID AC, Roc Barnes MD, 10 mg at 08/11/17 1731  •  Morphine sulfate (PF) injection 1 mg, 1 mg, Intravenous, Q4H PRN, Roc Barnes MD, 1 mg at 08/11/17 1502  •  nicotine (NICODERM CQ) 21 MG/24HR patch 1 patch, 1 patch, Transdermal, Q24H, Mo Garcia MD, 1 patch at 08/12/17 0922  •  norepinephrine (LEVOPHED) 8,000 mcg in sodium chloride 0.9 % 250 mL (32 mcg/mL) infusion, 0.02-0.3 mcg/kg/min, Intravenous, Titrated, Lobo Gu MD  •  ondansetron (ZOFRAN) tablet 4 mg, 4 mg, Oral, Q6H PRN **OR** ondansetron ODT (ZOFRAN-ODT) disintegrating tablet 4 mg, 4 mg, Oral, Q6H PRN **OR** ondansetron (ZOFRAN) injection 4 mg, 4 mg, Intravenous, Q6H PRN, Mo Garcia MD, 4 mg at 08/11/17 1845  •  pantoprazole (PROTONIX) EC tablet 40 mg, 40 mg, Oral, QAM, Mo Garcia MD, 40 mg at 08/12/17 0919  •  potassium chloride (MICRO-K) CR capsule 40 mEq, 40 mEq, Oral, PRN, 40 mEq at 08/05/17 1755 **OR** potassium chloride (KLOR-CON) packet 40 mEq, 40 mEq, Oral, PRN **OR** potassium chloride 10 mEq in 100 mL IVPB, 10 mEq, Intravenous, Q1H PRN, Mo Garcia MD, Last Rate: 100 mL/hr at 08/07/17 1141, 10 mEq at 08/07/17 1141  •  potassium chloride 10 mEq in 100 mL IVPB, 10 mEq, Intravenous, Q1H PRN, Lobo Gu MD, Last Rate: 100 mL/hr at 08/07/17 1039, 10 mEq at 08/07/17 1039  •  potassium chloride 20 mEq in 50 mL IVPB, 20 mEq, Intravenous, Q1H PRN, Roc Barnes MD, Last Rate: 50 mL/hr at  08/09/17 1038, 20 mEq at 08/09/17 1038  •  propranolol (INDERAL) tablet 20 mg, 20 mg, Oral, Q8H, Lobo Gu MD, 20 mg at 08/11/17 0629  •  sodium chloride 0.9 % flush 1-10 mL, 1-10 mL, Intravenous, PRN, Mo Garcia MD, 10 mL at 08/11/17 1850  •  Insert peripheral IV, , , Once **AND** sodium chloride 0.9 % flush 10 mL, 10 mL, Intravenous, PRN, Diogo Dickerson MD, 10 mL at 08/11/17 1041  •  sodium chloride 0.9 % infusion, 50 mL/hr, Intravenous, Continuous, Luis F Jalloh MD, Last Rate: 50 mL/hr at 08/12/17 0921, 50 mL/hr at 08/12/17 0921  •  sodium chloride tablet 1 g, 1 g, Oral, TID With Meals, Roc Barnes MD, 1 g at 08/12/17 0919  •  traMADol (ULTRAM) tablet 50 mg, 50 mg, Oral, Q8H PRN, Roc Barnes MD, 50 mg at 08/07/17 2012  •  Vitamin D3 50,000 Units, 50,000 Units, Oral, Once per day on Mon Thu, Roc Barnes MD, 50,000 Units at 08/10/17 0916  •  zolpidem (AMBIEN) tablet 5 mg, 5 mg, Oral, Nightly PRN, Roc Barnes MD, 5 mg at 08/07/17 2133     Diagnostic Data    Lab Results (last 24 hours)     Procedure Component Value Units Date/Time    Alpha - 1 - Antitrypsin Phenotype [[id number]]  (Abnormal) Collected:  08/02/17 2004    Specimen:  Blood Updated:  08/11/17 1315     A-1 Antitrypsin 268 (H) mg/dL      Phenotype MM             Phenotype   Population      A-1-AT Concentration                     Incidence %      Reference Interval         MM            86.5%                96 - 189         MS             8.0%                83 - 161         MZ             3.9%                60 - 111         FM             0.4%                93 - 191         SZ             0.3%                42 -  75         SS             0.1%                62 - 119         ZZ             0.05%               16 -  38         FS             0.05%               70 - 128         FZ            Unknown              44 -  88         FF            Unknown              Unknown       Narrative:       Performed at:  [facility]  [address]  [id number]  : Adria Howe PhD, Phone:  [phone]  Performed at:  [address]  [id number]  : Rakan Campos MD, Phone:  [phone]    Comprehensive Metabolic Panel [[id number]]  (Abnormal) Collected:  08/12/17 0454    Specimen:  Blood Updated:  08/12/17 0510     Glucose 74 mg/dL      BUN 38 (H) mg/dL      Creatinine 0.94 mg/dL      Sodium 132 (L) mmol/L      Potassium 3.6 mmol/L      Chloride 93 (L) mmol/L      CO2 25.0 mmol/L      Calcium 8.0 (L) mg/dL      Total Protein 5.3 (L) g/dL      Albumin 2.70 (L) g/dL      ALT (SGPT) 36 U/L      AST (SGOT) 15 U/L      Alkaline Phosphatase 74 U/L      Total Bilirubin 3.5 (H) mg/dL      eGFR   Amer 81 mL/min/1.73      Globulin 2.6 gm/dL      A/G Ratio 1.0 (L) g/dL      BUN/Creatinine Ratio 40.4 (H)     Anion Gap 14.0 mmol/L     Magnesium [[id number]]  (Normal) Collected:  08/12/17 0454    Specimen:  Blood Updated:  08/12/17 0510     Magnesium 1.9 mg/dL     Scan Slide [[id number]] Collected:  08/12/17 0936    Specimen:  Blood Updated:  08/12/17 0946    CBC & Differential [[id number]] Collected:  08/12/17 0936    Specimen:  Blood Updated:  08/12/17 1019    Narrative:       The following orders were created for panel order CBC & Differential.  Procedure                               Abnormality         Status                     ---------                               -----------         ------                     Scan Slide[[id number]]                                       In process                 CBC Auto Differential[[id number]]        Abnormal            Final result                 Please view results for these tests on the individual orders.    CBC Auto Differential [[id number]]  (Abnormal) Collected:  08/12/17 0936    Specimen:  Blood Updated:  08/12/17 1019     WBC 8.45 10*3/mm3      RBC 2.48 (L) 10*6/mm3      Hemoglobin 7.1 (L) g/dL      Hematocrit 21.4  (L) %      MCV 86.3 fL      MCH 28.6 pg      MCHC 33.2 g/dL      RDW 15.3 (H) %      RDW-SD 47.7 (H) fl      MPV -- fL       INSTRUMENT UNABLE TO CALCULATE RESULTS        Platelets 17 (C) 10*3/mm3       VERIFIED RESULTS REPEAT ANALYSIS        Neutrophil % 94.4 (H) %      Lymphocyte % 1.8 (L) %      Monocyte % 2.4 %      Eosinophil % 0.1 %      Basophil % 0.2 %      Immature Grans % 1.1 (H) %      Neutrophils, Absolute 7.98 10*3/mm3      Lymphocytes, Absolute 0.15 (L) 10*3/mm3      Monocytes, Absolute 0.20 10*3/mm3      Eosinophils, Absolute 0.01 10*3/mm3      Basophils, Absolute 0.02 10*3/mm3      Immature Grans, Absolute 0.09 (H) 10*3/mm3      nRBC 0.4 (H) /100 WBC     Extra Tubes [[id number]] Collected:  08/12/17 0937    Specimen:  Blood from Blood, Venous Line Updated:  08/12/17 1101    Narrative:       The following orders were created for panel order Extra Tubes.  Procedure                               Abnormality         Status                     ---------                               -----------         ------                     [facility][[id number]]                                   Final result                 Please view results for these tests on the individual orders.    [facility] [[id number]] Collected:  08/12/17 0937    Specimen:  Blood Updated:  08/12/17 1101     Extra Tube Hold for add-ons.      Auto resulted.           I reviewed the patient's new clinical results.    Assessment/Plan:     Active Hospital Problems (** Indicates Principal Problem)    Diagnosis Date Noted   • **Alcoholic cirrhosis of liver with ascites [K70.31] 07/19/2017 08/12/17.  Total bilirubin trending up today.  Ascites appears to be stable but still present.  Plan for family meeting today due to poor prognosis.    08/11/17.  Stable.  Mentation improved.  Severe malnutrition.  Not a candidate for PEG tube.    CIWA.  GI following.  Await recommendations.     • Hypomagnesemia [E83.42] 08/09/2017     Replace.   Recheck     • Thrombocytopenia [D69.6] 08/09/2017 08/12/17.  Drop in platelets today.  Total bilirubin increasing.  Suspect worsening liver failure.      Results from last 7 days  Lab Units 08/12/17  0936 08/11/17  0406 08/10/17  1839 08/09/17  0228 08/08/17  0435   PLATELETS 10*3/mm3 17* 30* 31* 27* 31*       08/11/17.  Platelets stable.  Monitor.    Results from last 7 days  Lab Units 08/11/17  0406 08/10/17  1839 08/09/17  0228 08/08/17  0435 08/08/17  0001   PLATELETS 10*3/mm3 30* 31* 27* 31* 34*     08/10/17.  CBC pending today.  Following platelets.    Secondary to cirrhosis.  Following platelets.  Been stable around 30.      Results from last 7 days  Lab Units 08/09/17  0228 08/08/17  0435 08/08/17  0001 08/07/17  1607 08/07/17  1220 08/07/17  0526 08/06/17  1831   PLATELETS 10*3/mm3 27* 31* 34* 35* 31* 42* 33*          • Hypokalemia [E87.6] 08/07/2017     08/10/17.  At goal today.  Monitor.    08/09/17.  Replace today.  Magnesium low today.  Replace both.  Recheck.  Monitor.    08/08/17.  At goal today.  Monitor.    Replace.  Recheck.  Magnesium at 1.7 mg/dL (Normal)     • Traumatic pneumothorax [S27.0XXA] 08/07/2017 08/11/17.  Decreasing per x-ray.  X-ray ordered for tomorrow morning.    08/10/17.  Persistent but small.  X-ray from today pending.    08/09/17.  X-ray pending.  Follow x-ray.  No respiratory distress.    08/08/17.  Improving.  Almost resolved per x-ray.  Will continue to monitor.    Imaging Results (last 24 hours)     Procedure Component Value Units Date/Time    XR Chest 1 View [[id number]] Collected:  08/07/17 1823     Updated:  08/07/17 1835    Narrative:         EXAM:         Radiograph(s), Chest   VIEWS:   Portable ; 1       DATE/TIME: 8/7/2017 6:32 PM CDT               INDICATION:     Pneumothorax follow-up      COMPARISON:   CXR: 8/7/17 at 04:14 hours          FINDINGS:           - lines/tubes:     Right approach central venous catheter,  unchanged.     - cardiac:          size  within normal limits         - mediastinum:  contour within normal limits         - lungs:          no focal air space process, pulmonary  interstitial edema, nodule(s)/mass             - pleura:          Previously described right-sided pneumothorax  has decreased in size with a small residual pneumothorax  remaining inferiorly/laterally on the right.                  - osseous:          unremarkable for age                  - misc.:        Impression:       CONCLUSION:        1. Interval decrease in size of the right pneumothorax.    Maintain short interval radiographic follow-up to document  resolution.                      Electronically signed by:  SHAR Melendrez MD  8/7/2017 6:34 PM  CDT Workstation: [id number]    XR Chest 1 View [[id number]] Collected:  08/08/17 0816     Updated:  08/08/17 0837    Narrative:       Chest single view.     CLINICAL INDICATION: Shortness of breath. Right-sided  pneumothorax, follow-up.    COMPARISON: Chest x-ray August 7, 2017.    FINDINGS: Cardiac silhouette is normal in size. Pulmonary  vascularity is unremarkable.     Small right-sided pneumothorax less than 5%.     0.9 cm of space between the pleural reflection and the chest wall  at the right lung base. Similar size to prior examination August 7, 2017.   Clearing of discoid infiltrative changes left lung base. Partial  clearing of discoid infiltrative, atelectatic changes right lung  base.      Impression:       CONCLUSION: Small right-sided pneumothorax unchanged since prior  examination. Clearing of discoid atelectatic changes at left lung  base. Partial clearing of discoid atelectatic changes right lung  base.    Electronically signed by:  Nickolas Gonzales MD  8/8/2017 8:36 AM CDT  Workstation: [id number]            Stable on x-ray.  Dr. Wang discussed case with CT surgery yesterday.  Monitoring.  Chest tube as needed with desaturation or worsening pneumothorax on x-ray.     • Hyponatremia [E87.1] 08/07/2017      Nephrology following.  Salt tab ordered.     • Ascites due to alcoholic cirrhosis [K70.31] 07/19/2017     GI following.  Monitor.  Paracentesis as needed, but with low platelet count, low blood pressure, and stable appearing ascites will discuss with family first in family meetings.     • Moderate episode of recurrent major depressive disorder [F33.1] 07/19/2017   • Chronic midline low back pain [M54.5, G89.29] 01/17/2017   • Chronic pelvic pain in female [R10.2, G89.29] 01/17/2017      Resolved Hospital Problems    Diagnosis Date Noted Date Resolved   No resolved problems to display.     Again alert and oriented today.  Increase nutrition as possible.  Plan for family meeting today.  Very poor prognosis.  Not a candidate for liver transplant due to alcohol use.  Per GI not a candidate for TIPS or PEG tube.  Patient refusing NG/OG.  She is trying to increase PO intake, but tachycardic today and shows signs of poor perfusion in her left lower extremity.  Pneumothorax minimal per last 3 xrays.  No respiratory distress.    Will monitor patient's hospital course and adjust treatment as hospital course dictates.    DVT prophylaxis: SCD/TEDs  Code status is Full Code    Plan for disposition:Unknown      Time:           This document has been electronically signed by Luis F Jalloh MD on August 12, 2017 10:25 AM

## 2017-08-12 NOTE — PROGRESS NOTES
"[facility]  [address] [place] [address]. [id number]  T - [phone]  F - [phone]     Progress Note          PATIENT  DEMOGRAPHICS   PATIENT NAME: Joseline Parnell                      PHYSICIAN: Ryne Stark MD  : 1980  MRN: [id number]   LOS: 10 days    Patient Care Team:  ERASMO Alvarez as PCP - General (Family Medicine)  Subjective   SUBJECTIVE   Blood pressure is low, tachycardic. Says she is feeling better today. Nausea improved but appetite remains poor.          Objective   OBJECTIVE   Vital Signs  Temp:  [97.6 °F (36.4 °C)-98 °F (36.7 °C)] 97.6 °F (36.4 °C)  Heart Rate:  [106-142] 142  BP: ()/(53-79) 98/71    Flowsheet Rows         First Filed Value    Admission Height  65\" (165.1 cm) Documented at 2017    Admission Weight  120 lb (54.4 kg) Documented at 2017 0046           I/O last 3 completed shifts:  In: 190 [IV Piggyback:190]  Out: 1120 [Urine:1120]    PHYSICAL EXAM    Physical Exam   Constitutional: She is oriented to person, place, and time. She appears well-developed.   Poorly nourished   HENT:   Head: Normocephalic and atraumatic.   Dry mucous membranes   Eyes: Right eye exhibits no discharge. Left eye exhibits no discharge. No scleral icterus.   Neck: Neck supple.   Cardiovascular: Regular rhythm and normal heart sounds.  Exam reveals no gallop and no friction rub.    No murmur heard.  Tachycardic    Pulmonary/Chest: Effort normal and breath sounds normal. No respiratory distress. She has no wheezes. She has no rales. She exhibits no tenderness.   Abdominal: Soft. Bowel sounds are normal. She exhibits distension. She exhibits no mass. There is tenderness. There is no guarding.   Musculoskeletal: She exhibits no edema or tenderness.   Neurological: She is alert and oriented to person, place, and time.   Skin:   Has multiple abrasions of skin. Cyanotic toes   Nursing note and vitals reviewed.      RESULTS   Results Review:  "     Results from last 7 days  Lab Units 08/12/17  0454 08/11/17  0406 08/10/17  0354   SODIUM mmol/L 132* 129* 132*   POTASSIUM mmol/L 3.6 4.0 3.7   CHLORIDE mmol/L 93* 92* 95   CO2 mmol/L 25.0 27.0 31.0   BUN mg/dL 38* 36* 26*   CREATININE mg/dL 0.94 0.93 0.58   CALCIUM mg/dL 8.0* 7.6* 7.6*   BILIRUBIN mg/dL 3.5* 2.6* 2.0*   ALK PHOS U/L 74 99 92   ALT (SGPT) U/L 36 42 42   AST (SGOT) U/L 15 16 14   GLUCOSE mg/dL 74 68 73       Estimated Creatinine Clearance: 61 mL/min (by C-G formula based on Cr of 0.94).      Results from last 7 days  Lab Units 08/12/17  0454 08/11/17  0406 08/10/17  0354   MAGNESIUM mg/dL 1.9 1.8 2.0       Results from last 7 days  Lab Units 08/11/17  0406 08/10/17  1839 08/09/17  0228 08/08/17  0435 08/08/17  0001   WBC 10*3/mm3 10.68* 9.21 6.09 5.98 6.07   HEMOGLOBIN g/dL 8.4* 8.5* 8.6* 9.0* 9.0*   PLATELETS 10*3/mm3 30* 31* 27* 31* 34*       Imaging Results (last 24 hours)     Procedure Component Value Units Date/Time    XR Chest 1 View [[id number]] Collected:  08/12/17 0416     Updated:  08/12/17 0504    Narrative:       Exam: AP portable chest    INDICATION: Pneumothorax    COMPARISON: 11/10/2017    FINDINGS: AP portable chest. Right IJ central venous catheter tip  is at the cavoatrial junction. The cardiomediastinal silhouette  is unremarkable. No visible right pneumothorax. Atelectasis in  lung bases.      Impression:       No significant change.    Electronically signed by:  Jose Angel Chávez MD  8/12/2017 5:03 AM  CDT Workstation: [id number]           MEDICATIONS      albumin human 25 g Intravenous TID   bacitracin  Topical Q12H   cyclobenzaprine 10 mg Oral BID   docusate sodium 100 mg Oral BID   famotidine 20 mg Oral BID   midodrine 10 mg Oral TID AC   nicotine 1 patch Transdermal Q24H   pantoprazole 40 mg Oral QAM   propranolol 20 mg Oral Q8H   sodium chloride 1 g Oral TID With Meals   Vitamin D3 50,000 Units Oral Once per day on Mon Thu       norepinephrine 0.02-0.3 mcg/kg/min          ASSESSMENT / PLAN    Principal Problem:    Alcoholic cirrhosis of liver with ascites  Active Problems:    Chronic midline low back pain    Chronic pelvic pain in female    Ascites due to alcoholic cirrhosis    Moderate episode of recurrent major depressive disorder    Hypokalemia    Traumatic pneumothorax    Hyponatremia    Hypomagnesemia    Thrombocytopenia      1. Acute kidney injury: Baseline creatinine 0.4-0.5mg/dl.  - Her creatinine went up to 0.93mg/dl. This is a significant elevation especially in this patient with cirrhosis and very little muscle mass.   - I am concerned that she is not perfusing well with low blood pressure despite midodrine.  - Continue holding lasix and Aldactone. Continue albumin 25gm TID scheduled. Will need additional albumin supplementation if she undergoes paracentesis.   - Consider giving a fluid bolus. HOLD midodrine for now as it might make her more tachycardic. Discussed with Dr. Jalloh  - Overall poor prognosis    2. Hyponatremia:  - Sodium improved to 132 from 129. Continue salt tabs  - Will hold diuretics as mentioned above. Off Effexor.      3. Alcoholic liver cirrhosis with marked ascites:  - Last alcohol intake was May/ June.   - Dr. You is following. Not a transplant candidate      4. Chronic back pain/depression:  - On ultram po and iv morphine. Avoid nsaids due to risk of variceal bleeding      5. Anemia:  - Hemoglobin pending today      6. Hypocalcemia with low albumin:  - Corrected calcium is acceptable  - Vitamin d deficiency- on replacement      Noted plan for family meeting today regarding goals of care.             This document has been electronically signed by Ryne Stark MD on August 12, 2017 8:54 AM

## 2017-08-12 NOTE — PLAN OF CARE
Problem: Patient Care Overview (Adult)  Goal: Plan of Care Review  Outcome: Ongoing (interventions implemented as appropriate)    08/12/17 0503   Coping/Psychosocial Response Interventions   Plan Of Care Reviewed With patient   Patient Care Overview   Progress no change   Outcome Evaluation   Outcome Summary/Follow up Plan VSS. pt had poor dietary intake. Pt remains confused at times and is very lethargic. PIV and Newman patnet. Newman output is sedimented and concentrated.        Goal: Adult Individualization and Mutuality  Outcome: Ongoing (interventions implemented as appropriate)  Goal: Discharge Needs Assessment  Outcome: Ongoing (interventions implemented as appropriate)    Problem: Liver Failure, Acute/Chronic (Adult)  Goal: Signs and Symptoms of Listed Potential Problems Will be Absent or Manageable (Liver Failure, Acute/Chronic)  Outcome: Ongoing (interventions implemented as appropriate)    Problem: Pressure Ulcer Risk (Sean Scale) (Adult,Obstetrics,Pediatric)  Goal: Skin Integrity  Outcome: Ongoing (interventions implemented as appropriate)    Problem: Fall Risk (Adult)  Goal: Absence of Falls  Outcome: Ongoing (interventions implemented as appropriate)

## 2017-08-12 NOTE — SIGNIFICANT NOTE
08/12/17 1054   Rehab Treatment   Discipline physical therapy assistant   Treatment Not Performed other (see comments)  (nsg. deferred tx. and stated no therapy on pt.)   Recommendation   PT - Next Appointment 08/13/17

## 2017-08-12 NOTE — PROGRESS NOTES
Had family meeting with patient's mother and sisters.  Explained patient's course and prognosis.  Patient liver worse today.  She has been tachycardic.  Have tried to give fluids today but have not been able to lower the heart rate.  Patient's blood pressure has been marginally low since admission, but we have been giving her midodrine, but was unable to give this today due to heart rate.  Patient's MAP has been around 65-70.  She has started to get more confused likely to liver failure.  After discussing patient's course and prognosis it was decided to make patient DNR/DNI.  Will give patient blood and platelets and start pressure support as needed.  Will discuss patient again with family after more family members arrive tonight.  Have discussed comfort measures, but for now have agreed to give patient some ativan due to increasing anxiousness.            This document has been electronically signed by Luis F Jalloh MD on August 12, 2017 6:39 PM

## 2017-08-13 PROBLEM — A41.9 SEPTIC SHOCK (HCC): Status: ACTIVE | Noted: 2017-01-01

## 2017-08-13 PROBLEM — K72.90 LIVER FAILURE (HCC): Status: ACTIVE | Noted: 2017-01-01

## 2017-08-13 PROBLEM — R65.21 SEPTIC SHOCK (HCC): Status: ACTIVE | Noted: 2017-01-01

## 2017-08-13 PROBLEM — E43 SEVERE MALNUTRITION (HCC): Status: ACTIVE | Noted: 2017-01-01

## 2017-08-13 NOTE — PROGRESS NOTES
Parenteral Nutrition by Pharmacy    Patient: Joseline Parnell  MRN#: [id number]  Attending: No name on file.  Admission Date: 080117    Subjective/Objective       Assessment      Lab Results   Component Value Date    GLUCOSE 34 (C) 08/13/2017    BUN 29 (H) 08/13/2017    CREATININE 0.88 08/13/2017    EGFRIFAFRI 87 08/13/2017    BCR 33.0 (H) 08/13/2017    K 2.7 (C) 08/13/2017    CO2 26.0 08/13/2017    CALCIUM 7.8 (L) 08/13/2017    ALBUMIN 3.10 (L) 08/13/2017    LABIL2 1.1 08/13/2017    AST 65 (H) 08/13/2017    ALT 48 08/13/2017     Lab Results   Component Value Date    GLUCOSE 34 (C) 08/13/2017    CALCIUM 7.8 (L) 08/13/2017     08/13/2017    K 2.7 (C) 08/13/2017    CO2 26.0 08/13/2017    CL 96 08/13/2017    BUN 29 (H) 08/13/2017    CREATININE 0.88 08/13/2017    EGFRIFAFRI 87 08/13/2017    BCR 33.0 (H) 08/13/2017    ANIONGAP 17.0 (H) 08/13/2017     Magnesium   Date Value Ref Range Status   08/13/2017 1.8 1.6 - 2.3 mg/dL Final     No results found for: PHOS  Calcium   Date Value Ref Range Status   08/13/2017 7.8 (L) 8.4 - 10.2 mg/dL Final     No results found for: TRIG  Above labs reviewed.    Plan       Nutritional support needed due to severe malnutrition and unable to provide adequate calories through enteral route.  Calculated REE 1031 kcal/day.  Goal calories with stress factor are 1450kcal/day.   Will initially provide patient with 1000 kcal/day in order to avoid refeeding syndrome as patient has several risk factors and already has low electrolyte levels.  Will increase calories daily as appropriate.  Pharmacy will continue to monitor and adjust formula as needed.    Russell Salazar III, [facility]  08/13/17  11:02 AM

## 2017-08-13 NOTE — PROGRESS NOTES
"Patient is very uncomfortable and condition continuing to deteriorate.  Liver enzymes are increasing and ammonia level as well.  Patient has been in guarded condition with poor prognosis since arrival but worsened acutely yesterday.  Patient was made DNR/DNI yesterday and patient's mother has been with patient all day.  After lengthy discussion with patient's mother she stated, \"I just want her to be comfortably and in no pain\".  Patient was made comfort measures per patient's mother's request.            This document has been electronically signed by Luis F Jalloh MD on August 13, 2017 12:34 PM      "

## 2017-08-13 NOTE — PROGRESS NOTES
"[facility]  [address] [place] [address]. [address]  T - [phone]  F - [phone]     Progress Note          PATIENT  DEMOGRAPHICS   PATIENT NAME: Joseline Parnell                      PHYSICIAN: Ryne Stark MD  : 1980  MRN: [id number]   LOS: 11 days    Patient Care Team:  ERASMO Alvarez as PCP - General (Family Medicine)  Subjective   SUBJECTIVE   Blood pressure remains low, tachycardic. Received fluids overnight and started on phenylephrine.        Objective   OBJECTIVE   Vital Signs  Temp:  [97.3 °F (36.3 °C)-100.1 °F (37.8 °C)] 99.7 °F (37.6 °C)  Heart Rate:  [128-153] 153  Resp:  [18-36] 28  BP: ()/(44-75) 78/44    Flowsheet Rows         First Filed Value    Admission Height  65\" (165.1 cm) Documented at 2017    Admission Weight  120 lb (54.4 kg) Documented at 2017 0046           I/O last 3 completed shifts:  In: 985.2 [Blood:757.2; IV Piggyback:218]  Out:  [Urine:]    PHYSICAL EXAM    Physical Exam   Constitutional: She is oriented to person, place, and time. She appears well-developed.   Poorly nourished   HENT:   Head: Normocephalic and atraumatic.   Dry mucous membranes   Eyes: Right eye exhibits no discharge. Left eye exhibits no discharge. No scleral icterus.   Neck: Neck supple.   Cardiovascular: Regular rhythm and normal heart sounds.  Exam reveals no gallop and no friction rub.    No murmur heard.  Tachycardic    Pulmonary/Chest: Effort normal and breath sounds normal. No respiratory distress. She has no wheezes. She has no rales. She exhibits no tenderness.   Abdominal: Soft. Bowel sounds are normal. She exhibits distension. She exhibits no mass. There is tenderness. There is no guarding.   Musculoskeletal: She exhibits no edema or tenderness.   Neurological: She is alert and oriented to person, place, and time.   Skin:   Has multiple abrasions of skin. Cyanotic toes   Nursing note and vitals reviewed.      RESULTS "   Results Review:      Results from last 7 days  Lab Units 08/13/17  0627 08/13/17  0441 08/12/17  0454 08/11/17  0406   SODIUM mmol/L 139  --  132* 129*   SODIUM, ARTERIAL mmol/L  --  137.3*  --   --    POTASSIUM mmol/L 2.7*  --  3.6 4.0   CHLORIDE mmol/L 96  --  93* 92*   CO2 mmol/L 26.0  --  25.0 27.0   BUN mg/dL 29*  --  38* 36*   CREATININE mg/dL 0.88  --  0.94 0.93   CALCIUM mg/dL 7.8*  --  8.0* 7.6*   BILIRUBIN mg/dL 7.6*  --  3.5* 2.6*   ALK PHOS U/L 86  --  74 99   ALT (SGPT) U/L 48  --  36 42   AST (SGOT) U/L 65*  --  15 16   GLUCOSE mg/dL 34*  --  74 68   GLUCOSE, ARTERIAL mmol/L  --  40  --   --        Estimated Creatinine Clearance: 65.1 mL/min (by C-G formula based on Cr of 0.88).      Results from last 7 days  Lab Units 08/13/17  0627 08/12/17  0454 08/11/17  0406   MAGNESIUM mg/dL 1.8 1.9 1.8       Results from last 7 days  Lab Units 08/13/17  0628 08/12/17  0936 08/11/17  0406 08/10/17  1839 08/09/17  0228   WBC 10*3/mm3 9.65 8.45 10.68* 9.21 6.09   HEMOGLOBIN g/dL 9.3* 7.1* 8.4* 8.5* 8.6*   PLATELETS 10*3/mm3 37* 17* 30* 31* 27*       Imaging Results (last 24 hours)     ** No results found for the last 24 hours. **           MEDICATIONS      albumin human 25 g Intravenous TID   bacitracin  Topical Q12H   famotidine 20 mg Intravenous Q12H   levoFLOXacin 500 mg Intravenous Q24H   nicotine 1 patch Transdermal Q24H   piperacillin-tazobactam 3.375 g Intravenous Q6H   sodium chloride 1 g Oral TID With Meals   Vitamin D3 50,000 Units Oral Once per day on Mon Thu       Pharmacy to dose vancomycin     phenylephrine 0.5-3 mcg/kg/min Last Rate: 0.8 mcg/kg/min (08/13/17 0647)   sodium chloride 50 mL/hr Last Rate: 50 mL/hr (08/12/17 0921)         ASSESSMENT / PLAN    Principal Problem:    Alcoholic cirrhosis of liver with ascites  Active Problems:    Chronic midline low back pain    Chronic pelvic pain in female    Ascites due to alcoholic cirrhosis    Moderate episode of recurrent major depressive disorder     Hypokalemia    Traumatic pneumothorax    Hyponatremia    Hypomagnesemia    Thrombocytopenia      1. Acute kidney injury: Baseline creatinine 0.4-0.5mg/dl.  - Creatinine improved slightly to 0.88 from 0.93mg/dl. This creatinine is still a significant elevation especially in this patient with cirrhosis and very little muscle mass.   - I am concerned that she is not perfusing well with low blood pressure despite midodrine.  - Continue holding lasix and Aldactone. Continue albumin 25gm TID scheduled and IVFs. Will need additional albumin supplementation if she undergoes paracentesis.   - Overall poor prognosis    2. Hyponatremia:  - Sodium improved to 139 from 132. Will discontinue salt tabs  - Will hold diuretics as mentioned above. Off Effexor.      3. Alcoholic liver cirrhosis with marked ascites:  - Last alcohol intake was May/ Ne.   - Dr. You is following. Not a transplant candidate      4. Chronic back pain/depression:  - On ultram po and iv morphine. Avoid nsaids due to risk of variceal bleeding      5. Anemia:  - Hemoglobin is acceptable at 9.3      6. Hypocalcemia with low albumin:  - Corrected calcium is acceptable  - Vitamin d deficiency- on replacement    7. Bacteremia: Cultures positive for strep and E.coli  - On Levaquin, Zosyn and vancomycin.  - Dose antibiotics per levels and adjust for renal function. Pharmcy dosing vancomycin.    8. Hypokalemia:  - Received 60meq of IV potassium. Okay to give another 40meq of KCl. Discussed with Dr. Jalloh.      Noted change in code status to DNR/DNI             This document has been electronically signed by Ryne Stark MD on August 13, 2017 8:31 AM

## 2017-08-13 NOTE — PROGRESS NOTES
MEDICINE DAILY PROGRESS NOTE  NAME: Joseline Parnell  : 1980  MRN: [id number]     LOS: 11 days     PROVIDER OF SERVICE: Luis F Jalloh MD    Chief Complaint: Alcoholic cirrhosis of liver with ascites    Subjective:     Interval History:  History taken from: patient chart family RN   Patient confused this morning.  She is more encephalopathic today.  She rested some better with ativan.  After family meeting yesterday and poor prognosis and worsening condition family opted for DNR/DNI; however, still want blood products, pressors, and antibiotics.    Review of Systems:   Review of Systems   Unable to perform ROS: Acuity of condition       Objective:     Vital Signs  Vitals:    17 0917 17 0932 17 1002 17 1133   BP: (!) 83/52 (!) 82/52 (!) 84/50 (!) 86/47   BP Location:       Patient Position:       Pulse: (!) 144 (!) 145 (!) 144 (!) 150   Resp:       Temp:    100.2 °F (37.9 °C)   TempSrc:       SpO2: 90% 100% 91% 97%   Weight:       Height:           Physical Exam  Physical Exam   Constitutional: She is oriented to person, place, and time. She appears well-developed. She appears cachectic. No distress.   HENT:   Head: Normocephalic and atraumatic.   Right Ear: External ear normal.   Left Ear: External ear normal.   Nose: Nose normal.   Eyes: Pupils are equal, round, and reactive to light.   Neck: Normal range of motion. Neck supple.   Cardiovascular: Regular rhythm and normal heart sounds.  Exam reveals no gallop and no friction rub.    No murmur heard.  Tachycardic   Pulmonary/Chest: Breath sounds normal. She is in respiratory distress. She has no wheezes. She has no rales. She exhibits no tenderness.   Abdominal: Soft. Bowel sounds are normal. She exhibits distension. She exhibits no mass. There is no tenderness. There is no rebound and no guarding.   Neurological: She is alert and oriented to person, place, and time.   Skin: She is not diaphoretic. No erythema.   Vascular changes left  foot.  Appears to be poorly perfusing limb tissues.  Left foot cool touch.   Psychiatric:   Confused this morning.     Fluid wave still present and appears no worse clinically.  Ascites appears to be stable.  Not worsening clinically.    Last 2 weights    08/11/17  0602 08/12/17  0454   Weight: 103 lb 13.4 oz (47.1 kg) 104 lb 15 oz (47.6 kg)       Medication Review    Current Facility-Administered Medications:   •  acetaminophen (TYLENOL) tablet 650 mg, 650 mg, Oral, Q4H PRN, Mo Garcia MD  •  Adult Central Clinimix TPN, , Intravenous, Continuous **AND** fat emulsion (INTRALIPID,LIPOSYN) 20 % infusion 24 g, 120 mL, Intravenous, Q24H, Luis F Jalloh MD  •  albumin human 25 % IV SOLN 25 g, 25 g, Intravenous, TID, Ryne Stark MD, 25 g at 08/13/17 0830  •  bacitracin ointment, , Topical, Q12H, Luis F Jalloh MD  •  bisacodyl (DULCOLAX) suppository 10 mg, 10 mg, Rectal, Daily PRN, Mo Garcia MD  •  dextrose (D50W) solution 50 mL, 50 mL, Intravenous, Q1H PRN, Diogo Dickerson MD, 50 mL at 08/13/17 0839  •  famotidine (PEPCID) injection 20 mg, 20 mg, Intravenous, Q12H, Ezequiel Parker MD, 20 mg at 08/13/17 0830  •  hydrALAZINE (APRESOLINE) injection 10 mg, 10 mg, Intravenous, Q6H PRN, Mo Garcia MD  •  levoFLOXacin (LEVAQUIN) 500 mg/100 mL D5W (premix) (LEVAQUIN) 500 mg, 500 mg, Intravenous, Q24H, Ezequiel Parker MD, 500 mg at 08/13/17 0637  •  loperamide (IMODIUM) capsule 2 mg, 2 mg, Oral, TID PRN, Roc Barnes MD, 2 mg at 08/10/17 2002  •  LORazepam (ATIVAN) injection 0.5 mg, 0.5 mg, Intravenous, Q6H PRN, Luis F Jalloh MD, 0.5 mg at 08/13/17 1130  •  metoprolol tartrate (LOPRESSOR) injection 5 mg, 5 mg, Intravenous, Q6H PRN, Ezequiel Parker MD  •  Morphine sulfate (PF) injection 1 mg, 1 mg, Intravenous, Q4H PRN, Roc Barnes MD, 1 mg at 08/13/17 1131  •  nicotine (NICODERM CQ) 21 MG/24HR patch 1 patch, 1 patch, Transdermal, Q24H, Mo Garcia MD, 1 patch  at 08/13/17 0835  •  ondansetron (ZOFRAN) tablet 4 mg, 4 mg, Oral, Q6H PRN **OR** ondansetron ODT (ZOFRAN-ODT) disintegrating tablet 4 mg, 4 mg, Oral, Q6H PRN **OR** ondansetron (ZOFRAN) injection 4 mg, 4 mg, Intravenous, Q6H PRN, Mo Garcia MD, 4 mg at 08/13/17 0054  •  Pharmacy to Dose TPN, , Does not apply, Continuous PRN, Luis F Jalloh MD  •  Pharmacy to dose vancomycin, , Does not apply, Continuous PRN, Ezequiel Parker MD  •  phenylephrine (EDNA-SYNEPHRINE) 50 mg in sodium chloride 0.9 % 250 mL (0.2 mg/mL) infusion, 0.5-3 mcg/kg/min, Intravenous, Titrated, Ezequiel Parker MD, Last Rate: 25.7 mL/hr at 08/13/17 1133, 1.8 mcg/kg/min at 08/13/17 1133  •  piperacillin-tazobactam (ZOSYN) 3.375 g in iso-osmotic dextrose 50 ml (premix), 3.375 g, Intravenous, Q6H, Ezequiel Parker MD, 3.375 g at 08/13/17 0545  •  potassium chloride (MICRO-K) CR capsule 40 mEq, 40 mEq, Oral, PRN, 40 mEq at 08/05/17 1755 **OR** potassium chloride (KLOR-CON) packet 40 mEq, 40 mEq, Oral, PRN **OR** potassium chloride 10 mEq in 100 mL IVPB, 10 mEq, Intravenous, Q1H PRN, Mo Garcia MD, Last Rate: 100 mL/hr at 08/07/17 1141, 10 mEq at 08/07/17 1141  •  potassium chloride 10 mEq in 100 mL IVPB, 10 mEq, Intravenous, Q1H PRN, Lobo Gu MD, Last Rate: 100 mL/hr at 08/07/17 1039, 10 mEq at 08/07/17 1039  •  potassium chloride 20 mEq in 50 mL IVPB, 20 mEq, Intravenous, Q1H PRN, Roc Barnes MD, Last Rate: 50 mL/hr at 08/13/17 0830, 20 mEq at 08/13/17 0830  •  sodium chloride 0.9 % flush 1-10 mL, 1-10 mL, Intravenous, PRN, Mo Garcia MD, 10 mL at 08/11/17 1850  •  Insert peripheral IV, , , Once **AND** sodium chloride 0.9 % flush 10 mL, 10 mL, Intravenous, PRN, Diogo Dickerson MD, 10 mL at 08/13/17 0315  •  sodium chloride 0.9 % infusion, 50 mL/hr, Intravenous, Continuous, Luis F Jalloh MD, Last Rate: 50 mL/hr at 08/12/17 0921, 50 mL/hr at 08/12/17 0921  •  traMADol (ULTRAM) tablet 50 mg, 50 mg,  Oral, Q8H PRN, Roc Barnes MD, 50 mg at 08/07/17 2012  •  Vitamin D3 50,000 Units, 50,000 Units, Oral, Once per day on Mon Thu, Roc Barnes MD, 50,000 Units at 08/10/17 0916     Diagnostic Data    Lab Results (last 24 hours)     Procedure Component Value Units Date/Time    Extra Tubes [[id number]] Collected:  08/12/17 1539    Specimen:  Blood from Blood, Venous Line Updated:  08/12/17 1701    Narrative:       The following orders were created for panel order Extra Tubes.  Procedure                               Abnormality         Status                     ---------                               -----------         ------                     Lavender Top[[id number]]                                     Final result               Gold Top - SST[[id number]]                                   Final result                 Please view results for these tests on the individual orders.    Lavender Top [[id number]] Collected:  08/12/17 1539    Specimen:  Blood Updated:  08/12/17 1701     Extra Tube hold for add-on      Auto resulted       Gold Top - SST [[id number]] Collected:  08/12/17 1559    Specimen:  Blood Updated:  08/12/17 1701     Extra Tube Hold for add-ons.      Auto resulted.       Blood Gas, Arterial [[id number]]  (Abnormal) Collected:  08/13/17 0441    Specimen:  Arterial Blood Updated:  08/13/17 0504     Site --      Not performed at this site.        Martin's Test --      Not performed at this site.        pH, Arterial 7.499 (H) pH units      pCO2, Arterial 34.2 (L) mm Hg      pO2, Arterial 99.5 mm Hg      HCO3, Arterial 26.0 mmol/L      Base Excess, Arterial 2.9 (H) mmol/L      O2 Saturation, Arterial 97.3 %      Hemoglobin, Blood Gas 9.6 (L) g/dL      Hematocrit, Blood Gas 28.0 %      CO2 Content 27.1 (H)     Sodium, Arterial 137.3 (L) mmol/L      Potassium, Arterial 2.74 (L) mmol/L      Glucose, Arterial 40 mmol/L      Barometric Pressure for Blood Gas -- mmHg       Not performed at this site.        Modality  --      Not performed at this site.        Ionized Calcium 3.9 (L) mg/dL     Blood Culture ID, PCR [[id number]]  (Abnormal) Collected:  08/12/17 1602    Specimen:  Blood from Neck Updated:  08/13/17 0508     BCID, PCR Streptococcus spp, not A, B, or pneumoniae. Identification by BCID PCR. (C)     BCID, PCR 2 Escherichia coli. Identification by BCID PCR. (C)    Narrative:         Sensitivity to Follow    Blood Culture [[id number]]  (Abnormal) Collected:  08/12/17 1602    Specimen:  Blood from Neck Updated:  08/13/17 0508     Blood Culture Abnormal Stain (A)     Gram Stain Result Anaerobic Bottle Gram positive cocci in clusters      1 of 4         Aerobic Bottle Gram positive cocci in clusters      2 of 4         Anaerobic Bottle Gram negative bacilli      1 of 4         Aerobic Bottle Gram negative bacilli      2 of 4       Blood Culture [[id number]]  (Abnormal) Collected:  08/12/17 1559    Specimen:  Blood from Arm, Right Updated:  08/13/17 0509     Blood Culture Abnormal Stain (A)     Gram Stain Result Aerobic Bottle Gram positive cocci in clusters      3 of 4         Anaerobic Bottle Gram negative bacilli      3 of 4         Aerobic Bottle Gram positive cocci in clusters      4 of 4         Anaerobic Bottle Gram negative bacilli      4 of 4       CBC (No Diff) [[id number]]  (Abnormal) Collected:  08/13/17 0628    Specimen:  Blood Updated:  08/13/17 0637     WBC 9.65 10*3/mm3      RBC 3.21 (L) 10*6/mm3      Hemoglobin 9.3 (L) g/dL      Hematocrit 27.0 (L) %      MCV 84.1 fL      MCH 29.0 pg      MCHC 34.4 g/dL      RDW 15.9 (H) %      RDW-SD 48.4 (H) fl      MPV 11.4 fL      Platelets 37 (L) 10*3/mm3     Magnesium [[id number]]  (Normal) Collected:  08/13/17 0627    Specimen:  Blood Updated:  08/13/17 0639     Magnesium 1.8 mg/dL     Comprehensive Metabolic Panel [[id number]]  (Abnormal) Collected:  08/13/17 0627    Specimen:  Blood Updated:  08/13/17 0652     Glucose 34 (C) mg/dL      BUN 29 (H) mg/dL      Creatinine  0.88 mg/dL      Sodium 139 mmol/L      Potassium 2.7 (C) mmol/L      Chloride 96 mmol/L      CO2 26.0 mmol/L      Calcium 7.8 (L) mg/dL      Total Protein 6.0 (L) g/dL      Albumin 3.10 (L) g/dL      ALT (SGPT) 48 U/L      AST (SGOT) 65 (H) U/L      Alkaline Phosphatase 86 U/L      Total Bilirubin 7.6 (H) mg/dL      eGFR   Amer 87 mL/min/1.73      Globulin 2.9 gm/dL      A/G Ratio 1.1 g/dL      BUN/Creatinine Ratio 33.0 (H)     Anion Gap 17.0 (H) mmol/L     Urine Culture [[id number]]  (Abnormal) Collected:  08/12/17 1838    Specimen:  Urine from Urine, Catheter Updated:  08/13/17 0746     Urine Culture >100,000 CFU/mL Gram Negative Bacilli (A)    Phosphorus [[id number]] Collected:  08/13/17 1126    Specimen:  Blood Updated:  08/13/17 1137    Prealbumin [[id number]] Collected:  08/13/17 1126    Specimen:  Blood Updated:  08/13/17 1137    Cholesterol, Total [[id number]] Collected:  08/13/17 1126    Specimen:  Blood Updated:  08/13/17 1137    Triglycerides [[id number]] Collected:  08/13/17 1126    Specimen:  Blood Updated:  08/13/17 1137    C-reactive Protein [[id number]] Collected:  08/13/17 1126    Specimen:  Blood Updated:  08/13/17 1137    Ammonia [[id number]] Collected:  08/13/17 1126    Specimen:  Blood Updated:  08/13/17 1139    Extra Tubes [[id number]] Collected:  08/13/17 1126    Specimen:  Blood from Blood, Venous Line Updated:  08/13/17 1142    Narrative:       The following orders were created for panel order Extra Tubes.  Procedure                               Abnormality         Status                     ---------                               -----------         ------                     Lavender Top[[id number]]                                     In process                   Please view results for these tests on the individual orders.    Lavender Top [[id number]] Collected:  08/13/17 1126    Specimen:  Blood Updated:  08/13/17 1142    Calcium, Ionized [[id number]]  (Abnormal) Collected:  08/13/17 1126       Specimen:  Blood Updated:  08/13/17 1146     Ionized Calcium 4.0 (L) mg/dL         I reviewed the patient's new clinical results.    Assessment/Plan:     Active Hospital Problems (** Indicates Principal Problem)    Diagnosis Date Noted   • **Alcoholic cirrhosis of liver with ascites [K70.31] 07/19/2017 08/13/17.  Total bilirubin up again today.  Ascites still stable.  Family meeting yesterday patient made DNR/DNI.  Family aware of poor prognosis.    08/12/17.  Total bilirubin trending up today.  Ascites appears to be stable but still present.  Plan for family meeting today due to poor prognosis.    08/11/17.  Stable.  Mentation improved.  Severe malnutrition.  Not a candidate for PEG tube.    CIWA.  GI following.  Await recommendations.     • Septic shock [A41.9, R65.21] 08/13/2017 08/13/17.  Patient condition worsened acutely yesterday with increasing heart rate and blood pressure dropping.  Pan cultures were ordered.  Antibiotics - Levaquin, Vanc, Zosyn ordered overnight.  Afebrile, No elevation in white count, but worsening heart rate, respiratory rate, and blood pressure.  Patient started on phenylephrine with attempt to keep MAP above 65.       • Severe malnutrition [E43] 08/13/2017     Patient has muscle wasting and severe malnutrition.  Per patient she did not want a feed tube on any kind (PEG/OG/NG).  She didn't want supplemental feeding as well as she was attempting to increase by mouth intake.  Patient's by mouth intake has been decreasing over the last 2 days after she had a few days where she fed better.  Discussed with mom and beside this morning.  Family wishes for TPN at this time.  Will start TPN.     • Liver failure [K72.90] 08/13/2017     Worsening liver failure with increase in encephalopathy.  Ammonia pending today.  Patient's Total bilirubin also increased overnight.  Secondary to cirrhosis from long standing alcohol abuse.     • Hypomagnesemia [E83.42] 08/09/2017     Replace.   Recheck     • Thrombocytopenia [D69.6] 08/09/2017 08/13/17.  Platelets increased today after platelets given.  Monitor.    Results from last 7 days  Lab Units 08/13/17  0628 08/12/17  0936 08/11/17  0406 08/10/17  1839 08/09/17  0228   PLATELETS 10*3/mm3 37* 17* 30* 31* 27*     08/12/17.  Drop in platelets today.  Total bilirubin increasing.  Suspect worsening liver failure.      Results from last 7 days  Lab Units 08/12/17  0936 08/11/17  0406 08/10/17  1839 08/09/17  0228 08/08/17  0435   PLATELETS 10*3/mm3 17* 30* 31* 27* 31*       08/11/17.  Platelets stable.  Monitor.    Results from last 7 days  Lab Units 08/11/17  0406 08/10/17  1839 08/09/17  0228 08/08/17  0435 08/08/17  0001   PLATELETS 10*3/mm3 30* 31* 27* 31* 34*     08/10/17.  CBC pending today.  Following platelets.    Secondary to cirrhosis.  Following platelets.  Been stable around 30.      Results from last 7 days  Lab Units 08/09/17  0228 08/08/17  0435 08/08/17  0001 08/07/17  1607 08/07/17  1220 08/07/17  0526 08/06/17  1831   PLATELETS 10*3/mm3 27* 31* 34* 35* 31* 42* 33*          • Hypokalemia [E87.6] 08/07/2017 08/13/17.  Potassium 2.4 today.  Will give replace today.    08/10/17.  At goal today.  Monitor.    08/09/17.  Replace today.  Magnesium low today.  Replace both.  Recheck.  Monitor.    08/08/17.  At goal today.  Monitor.    Replace.  Recheck.  Magnesium at 1.7 mg/dL (Normal)     • Traumatic pneumothorax [S27.0XXA] 08/07/2017 08/13/17.  Per x-ray yesterday.  No visible pneumothorax.  Appears to have resolved.    Imaging Results (last 72 hours)     Procedure Component Value Units Date/Time    XR Chest 1 View [[id number]] Collected:  08/10/17 1906     Updated:  08/10/17 1921    Narrative:         EXAM:         Radiograph(s), Chest   VIEWS:   Portable ; 1       DATE/TIME: 8/10/2017 7:18 PM CDT               INDICATION:     Pneumothorax follow-up      COMPARISON:   CXR: 8/9/17          FINDINGS:           - lines/tubes:     Right  approach jugular central venous catheter  in standard position, unchanged.     - cardiac:          size within normal limits         - mediastinum:  contour within normal limits         - lungs:          no focal air space process, pulmonary  interstitial edema, nodule(s)/mass             - pleura:          Minimal right-sided pneumothorax, loculated in  the inferior/lateral aspect, marginally decreased in size.                   - osseous:          unremarkable for age                  - misc.:        Impression:       CONCLUSION:        1. Decreasing minimal right pneumothorax.                      Electronically signed by:  SHAR Melendrez MD  8/10/2017 7:20  PM CDT Workstation: [id number]    XR Chest 1 View [[id number]] Collected:  08/12/17 0416     Updated:  08/12/17 0504    Narrative:       Exam: AP portable chest    INDICATION: Pneumothorax    COMPARISON: 11/10/2017    FINDINGS: AP portable chest. Right IJ central venous catheter tip  is at the cavoatrial junction. The cardiomediastinal silhouette  is unremarkable. No visible right pneumothorax. Atelectasis in  lung bases.      Impression:       No significant change.    Electronically signed by:  Jose Angel Chávez MD  8/12/2017 5:03 AM  CDT Workstation: [id number]        08/11/17.  Decreasing per x-ray.  X-ray ordered for tomorrow morning.    08/10/17.  Persistent but small.  X-ray from today pending.    08/09/17.  X-ray pending.  Follow x-ray.  No respiratory distress.    08/08/17.  Improving.  Almost resolved per x-ray.  Will continue to monitor.    Imaging Results (last 24 hours)     Procedure Component Value Units Date/Time    XR Chest 1 View [[id number]] Collected:  08/07/17 1823     Updated:  08/07/17 1835    Narrative:         EXAM:         Radiograph(s), Chest   VIEWS:   Portable ; 1       DATE/TIME: 8/7/2017 6:32 PM CDT               INDICATION:     Pneumothorax follow-up      COMPARISON:   CXR: 8/7/17 at 04:14 hours          FINDINGS:            - lines/tubes:     Right approach central venous catheter,  unchanged.     - cardiac:          size within normal limits         - mediastinum:  contour within normal limits         - lungs:          no focal air space process, pulmonary  interstitial edema, nodule(s)/mass             - pleura:          Previously described right-sided pneumothorax  has decreased in size with a small residual pneumothorax  remaining inferiorly/laterally on the right.                  - osseous:          unremarkable for age                  - misc.:        Impression:       CONCLUSION:        1. Interval decrease in size of the right pneumothorax.    Maintain short interval radiographic follow-up to document  resolution.                      Electronically signed by:  SHAR Melendrez MD  8/7/2017 6:34 PM  CDT Workstation: [id number]    XR Chest 1 View [[id number]] Collected:  08/08/17 0816     Updated:  08/08/17 0837    Narrative:       Chest single view.     CLINICAL INDICATION: Shortness of breath. Right-sided  pneumothorax, follow-up.    COMPARISON: Chest x-ray August 7, 2017.    FINDINGS: Cardiac silhouette is normal in size. Pulmonary  vascularity is unremarkable.     Small right-sided pneumothorax less than 5%.     0.9 cm of space between the pleural reflection and the chest wall  at the right lung base. Similar size to prior examination August 7, 2017.   Clearing of discoid infiltrative changes left lung base. Partial  clearing of discoid infiltrative, atelectatic changes right lung  base.      Impression:       CONCLUSION: Small right-sided pneumothorax unchanged since prior  examination. Clearing of discoid atelectatic changes at left lung  base. Partial clearing of discoid atelectatic changes right lung  base.    Electronically signed by:  Nickolas Gonzales MD  8/8/2017 8:36 AM CDT  Workstation: [id number]            Stable on x-ray.  Dr. Wang discussed case with CT surgery yesterday.  Monitoring.  Chest tube as needed  with desaturation or worsening pneumothorax on x-ray.     • Hyponatremia [E87.1] 08/07/2017     Nephrology following.  Salt tab ordered.     • Ascites due to alcoholic cirrhosis [K70.31] 07/19/2017 08/13/17.  Ascites stable.  No increase in size per physical exam and daily weight.  Patient has been dealing with recurrent ascites outpatient that required therapeutic paracentesis.    GI following.  Monitor.  Paracentesis as needed, but with low platelet count, low blood pressure, and stable appearing ascites will discuss with family first in family meetings.     • Moderate episode of recurrent major depressive disorder [F33.1] 07/19/2017   • Chronic midline low back pain [M54.5, G89.29] 01/17/2017   • Chronic pelvic pain in female [R10.2, G89.29] 01/17/2017      Resolved Hospital Problems    Diagnosis Date Noted Date Resolved   No resolved problems to display.     Patient confused today.  Prognosis still very poor, as patient's condition is worsening acutely and appears her liver failure is worsening acutely.  Family aware of the patient's worsening condition and prognosis.  Decision yesterday to make patient DNR/DNI, but still want pressors, antibiotics, blood, and TPN for right now.  Patient expressed that she did not want a feeding tube of any kind.  PO status has been poor for weeks prior to admission.  She had some days with increased nutrition, but over the last two days has had zero oral intake.      Will monitor patient's hospital course and adjust treatment as hospital course dictates.    DVT prophylaxis: SCD/TEDs  Code status is Conditional Code    Plan for disposition:Unknown      Time:           This document has been electronically signed by Luis F Jalloh MD on August 13, 2017 11:54 AM

## 2017-08-14 LAB
ABO + RH BLD: NORMAL
BACTERIA SPEC AEROBE CULT: ABNORMAL
BH BB BLOOD EXPIRATION DATE: NORMAL
BH BB BLOOD TYPE BARCODE: 5100
BH BB BLOOD TYPE BARCODE: 5100
BH BB BLOOD TYPE BARCODE: NORMAL
BH BB BLOOD TYPE BARCODE: NORMAL
BH BB DISPENSE STATUS: NORMAL
BH BB PRODUCT CODE: NORMAL
BH BB UNIT NUMBER: NORMAL
UNIT  ABO: NORMAL
UNIT  RH: NORMAL

## 2017-08-14 NOTE — PLAN OF CARE
Problem: Inpatient Occupational Therapy  Goal: Transfer Training Goal 1 LTG- OT  Outcome: Unable to achieve outcome(s) by discharge Date Met:  08/14/17    08/10/17 1541 08/14/17 0910   Transfer Training OT LTG   Transfer Training OT LTG, Date Established 08/10/17 --    Transfer Training OT LTG, Time to Achieve by discharge --    Transfer Training OT LTG, Activity Type all transfers --    Transfer Training OT LTG, [place] Level supervision required --    Transfer Training OT LTG, Assist Device walker, rolling --    Transfer Training OT LTG, Date Goal Reviewed --  08/14/17   Transfer Training OT LTG, Outcome --  goal not met   Transfer Training OT LTG, Reason Goal Not Met --  discharged from facility       Goal: Strength Goal LTG- OT  Outcome: Unable to achieve outcome(s) by discharge Date Met:  08/14/17    08/10/17 1541 08/14/17 0910   Strength OT LTG   Strength Goal OT LTG, Date Established 08/10/17 --    Strength Goal OT LTG, Time to Achieve by discharge --    Strength Goal OT LTG, Measure to Achieve 1-2 sets of 10 reps all planes with 1-2 lb wrist wts or dumbells to increase strength in B UEs. --    Strength Goal OT LTG, Date Goal Reviewed --  08/14/17   Strength Goal OT LTG, Outcome --  goal not met   Strength Goal OT LTG, Reason Goal Not Met --  discharged from facility       Goal: Static Standing Balance Goal LTG- OT  Outcome: Unable to achieve outcome(s) by discharge Date Met:  08/14/17    08/10/17 1541 08/14/17 0910   Static Standing Balance OT LTG   Static Standing Balance OT LTG, Date Established 08/10/17 --    Static Standing Balance OT LTG, Time to Achieve by discharge --    Static Standing Balance OT LTG, [place] Level conditional independence  (5 minutes with functional activity.) --    Static Standing Balance OT LTG, Assist Device assistive device  (Rolling walker.) --    Static Standing Balance OT LTG, Date Goal Reviewed --  08/14/17   Static Standing Balance OT LTG, Outcome --  goal not met    Static Standing Balance OT LTG, Reason Goal Not Met --  discharged from facility       Goal: ADL Goal LTG- OT  Outcome: Unable to achieve outcome(s) by discharge Date Met:  08/14/17    08/10/17 1541 08/14/17 0910   ADL OT LTG   ADL OT LTG, Date Established 08/10/17 --    ADL OT LTG, Time to Achieve by discharge --    ADL OT LTG, Activity Type ADL skills  (Sponge bath and dress or walk-in shower.) --    ADL OT LTG, [place] Level modified independent;assistive device  (rolling walker if needed.) --    ADL OT LTG, Date Goal Reviewed --  08/14/17   ADL OT LTG, Outcome --  goal not met   ADL OT LTG, Reason Goal Not Met --  discharged from facility

## 2017-08-14 NOTE — DISCHARGE SUMMARY
DEATH SUMMARY    NAME: Joseline Parnell   PHYSICIAN: Luis F Jalloh MD  : 1980  MRN: [id number]    ADMITTED: 2017   DISCHARGED: 17       TIME OF DEATH: 1747   CONTACTED: No  AUTOPSY REQUESTED: No    ADMISSION DIAGNOSES:  Present on Admission:  • Alcoholic cirrhosis of liver with ascites  • Chronic midline low back pain  • Chronic pelvic pain in female  • Ascites due to alcoholic cirrhosis  • Moderate episode of recurrent major depressive disorder  • Thrombocytopenia  • Severe malnutrition    DISCHARGE DIAGNOSES:   • Comfort measures with cardiopulmonary arrest  • Alcoholic cirrhosis of liver with ascites  • Severe malnutrition  • Liver failure with hepatic encephalopathy  • Ascites due to alcoholic cirrhosis  • Thrombocytopenia  • Hyponatremia  • Moderate episode of recurrent major depressive disorder  • Chronic midline low back pain  • Chronic pelvic pain in female      SERVICE: Medicine. Attending Lius F Jalloh MD    CONSULTS:   Consult Orders (all)     Start     Ordered    17 1052  Inpatient Consult to Nutrition Services  Once     Provider:  (Not yet assigned)    17 1054    17 1513  Inpatient Consult to Nutrition  Once     Provider:  (Not yet assigned)    17 1512    17 0510  Inpatient Consult to Case Management   Once     Provider:  (Not yet assigned)    17 0509    17 0905  Inpatient Consult to Nutrition  Once     Provider:  (Not yet assigned)    17 0904    17 1718  Inpatient Consult to Gastroenterology  Once     Specialty:  Gastroenterology  Provider:  Chandrakant You DO    17 1717    17 1640  Inpatient Consult to Gastroenterology  Once     Specialty:  Gastroenterology  Provider:  Chandrakant You DO    17 1640    17 0023  Nephrology - BOONE (on-call MD from group unless specified)  Once     Specialty:  Nephrology  Provider:  Roc Barnes MD    17 0022    17 0018  Hospitalist (on-call  MD unless specified)  Once     Specialty:  Hospitalist  Provider:  Mo Garcia MD    08/02/17 0017          PROCEDURES:   Imaging Results (last 7 days)     Procedure Component Value Units Date/Time    XR Chest 1 View [[id number]] Collected:  08/07/17 0415     Updated:  08/07/17 0437    Narrative:         Chest single view on  8/7/2017     CLINICAL INDICATION: Follow-up right-sided pneumothorax    COMPARISON: Chest CT from 8/6/2016 and chest x-ray from 8/2/2017    FINDINGS: There is a stable small to moderate-sized right  pneumothorax. There is approximately 1.7 cm of pleural separation  superiorly with 2.3 cm of pleural separation laterally. Even  though this is stable this appears large enough to consider chest  tube placement. Recommend at least close clinical follow-up and  short-term follow-up imaging. There is mild bibasilar  atelectasis. Right IJ catheter tip is in the SVC. Heart is within  normal limits for size.      Impression:       Stable small to moderate size right pneumothorax that  is large enough for chest tube placement but stability may mean  this may not require chest tube placement. Recommend at least  close clinical follow-up and short-term follow-up imaging.    Electronically signed by:  Ge Haney  8/7/2017 4:36 AM CDT  Workstation: [id number]    XR Chest 1 View [[id number]] Collected:  08/07/17 1823     Updated:  08/07/17 1835    Narrative:         EXAM:         Radiograph(s), Chest   VIEWS:   Portable ; 1       DATE/TIME: 8/7/2017 6:32 PM CDT               INDICATION:     Pneumothorax follow-up      COMPARISON:   CXR: 8/7/17 at 04:14 hours          FINDINGS:           - lines/tubes:     Right approach central venous catheter,  unchanged.     - cardiac:          size within normal limits         - mediastinum:  contour within normal limits         - lungs:          no focal air space process, pulmonary  interstitial edema, nodule(s)/mass             - pleura:           Previously described right-sided pneumothorax  has decreased in size with a small residual pneumothorax  remaining inferiorly/laterally on the right.                  - osseous:          unremarkable for age                  - misc.:        Impression:       CONCLUSION:        1. Interval decrease in size of the right pneumothorax.    Maintain short interval radiographic follow-up to document  resolution.                      Electronically signed by:  SHAR Melendrez MD  8/7/2017 6:34 PM  CDT Workstation: [employer]PRIMARY    XR Chest 1 View [[id number]] Collected:  08/08/17 0816     Updated:  08/08/17 0837    Narrative:       Chest single view.     CLINICAL INDICATION: Shortness of breath. Right-sided  pneumothorax, follow-up.    COMPARISON: Chest x-ray August 7, 2017.    FINDINGS: Cardiac silhouette is normal in size. Pulmonary  vascularity is unremarkable.     Small right-sided pneumothorax less than 5%.     0.9 cm of space between the pleural reflection and the chest wall  at the right lung base. Similar size to prior examination August 7, 2017.   Clearing of discoid infiltrative changes left lung base. Partial  clearing of discoid infiltrative, atelectatic changes right lung  base.      Impression:       CONCLUSION: Small right-sided pneumothorax unchanged since prior  examination. Clearing of discoid atelectatic changes at left lung  base. Partial clearing of discoid atelectatic changes right lung  base.    Electronically signed by:  Nickolas Gonzales MD  8/8/2017 8:36 AM CDT  Workstation: [id number]    XR Chest 1 View [[id number]] Collected:  08/09/17 0950     Updated:  08/09/17 1027    Narrative:       [facility] [place]    Patient Name: KAIDEN ALONSO    ORDERING: COLMEAN ZAMARRIPA     ATTENDING: GENESIS NGUYEN     REFERRING: COLEMAN ZAMARRIPA    -----------------------    PROCEDURE: Portable chest x-ray    TECHNIQUE: Single AP view of the chest    COMPARISON: 8/8/2017    HISTORY: Pneumothorax, K70.31  Alcoholic cirrhosis of liver with  ascites E87.1 Hypo-osmolality and hyponatremia E16.2  Hypoglycemia, unspecified D64.9 Anemia, unspecified E87.3  Alkalosis    FINDINGS:     Life-support devices: Right-sided central venous catheter  terminates in the region of the SVC.    Lungs/pleura: There is a small persistent right-sided  pneumothorax, mostly visualized lateral portion of the right  lower lung field, unchanged in size compared to prior study.  Lungs otherwise appear clear.    Heart, hilar and mediastinal structures:  Normal accounting for  projection and technique      Impression:       CONCLUSION:  There is a small persistent right-sided pneumothorax, mostly  visualized lateral portion of the right lower lung field,  unchanged in size compared to prior study.    Electronically signed by:  Rakan Moraes MD  8/9/2017 10:26 AM CDT  Workstation: [id number]    XR Chest 1 View [[id number]] Collected:  08/10/17 1906     Updated:  08/10/17 1921    Narrative:         EXAM:         Radiograph(s), Chest   VIEWS:   Portable ; 1       DATE/TIME: 8/10/2017 7:18 PM CDT               INDICATION:     Pneumothorax follow-up      COMPARISON:   CXR: 8/9/17          FINDINGS:           - lines/tubes:     Right approach jugular central venous catheter  in standard position, unchanged.     - cardiac:          size within normal limits         - mediastinum:  contour within normal limits         - lungs:          no focal air space process, pulmonary  interstitial edema, nodule(s)/mass             - pleura:          Minimal right-sided pneumothorax, loculated in  the inferior/lateral aspect, marginally decreased in size.                   - osseous:          unremarkable for age                  - misc.:        Impression:       CONCLUSION:        1. Decreasing minimal right pneumothorax.                      Electronically signed by:  SHAR Melendrez MD  8/10/2017 7:20  PM CDT Workstation: [email or web address]    XR Chest 1 View [[id number]]  Collected:  08/12/17 0416     Updated:  08/12/17 0504    Narrative:       Exam: AP portable chest    INDICATION: Pneumothorax    COMPARISON: 11/10/2017    FINDINGS: AP portable chest. Right IJ central venous catheter tip  is at the cavoatrial junction. The cardiomediastinal silhouette  is unremarkable. No visible right pneumothorax. Atelectasis in  lung bases.      Impression:       No significant change.    Electronically signed by:  Jose Angel Chávez MD  8/12/2017 5:03 AM  [employer] Workstation: [employer]          HISTORY OF PRESENT ILLNESS: *Copied from Dr. Mo Garcia's H&P*  Patient is a 37 y.o. female presents with Complaint of having abdominal pain.  Patient is on call induced liver cirrhosis.  Patient had history of ascites.  Patient stated her abdomen was swollen and she had severe abdominal pain which resulted in her coming to emergency room for further evaluation.  Patient does complain of having shortness of breath associated with it.  Upon arrival to emergency room patient was noted to be hyponatremic and patient was admitted for further evaluation and treatment.    DIAGNOSTIC DATA:   Lab Results (last 7 days)     Procedure Component Value Units Date/Time    CBC & Differential [[id number]] Collected:  08/06/17 1831    Specimen:  Blood Updated:  08/06/17 1923    Narrative:       The following orders were created for panel order CBC & Differential.  Procedure                               Abnormality         Status                     ---------                               -----------         ------                     Scan Slide[[id number]]                                       Final result               CBC Auto Differential[[id number]]        Abnormal            Final result                 Please view results for these tests on the individual orders.    Scan Slide [[id number]] Collected:  08/06/17 1831    Specimen:  Blood Updated:  08/06/17 1923     Anisocytosis Slight/1+     Hypochromia Slight/1+      Target Cells Mod/2+     Toxic Granulation Mod/2+     Vacuolated Neutrophils Slight/1+     Platelet Estimate Decreased     Clumped Platelets --      NONE SEEN       CBC Auto Differential [[id number]]  (Abnormal) Collected:  08/07/17 0526    Specimen:  Blood Updated:  08/07/17 0624     WBC 5.77 10*3/mm3      RBC 3.06 (L) 10*6/mm3      Hemoglobin 9.2 (L) g/dL      Hematocrit 26.5 (L) %      MCV 86.6 fL      MCH 30.1 pg      MCHC 34.7 g/dL      RDW 15.4 (H) %      RDW-SD 48.6 (H) fl      MPV 12.6 (H) fL      Platelets 42 (L) 10*3/mm3      Neutrophil % 86.1 (H) %      Lymphocyte % 11.6 %      Monocyte % 1.4 %      Eosinophil % 0.2 %      Basophil % 0.2 %      Immature Grans % 0.5 %      Neutrophils, Absolute 4.97 10*3/mm3      Lymphocytes, Absolute 0.67 10*3/mm3      Monocytes, Absolute 0.08 10*3/mm3      Eosinophils, Absolute 0.01 10*3/mm3      Basophils, Absolute 0.01 10*3/mm3      Immature Grans, Absolute 0.03 (H) 10*3/mm3      nRBC 0.0 /100 WBC     CBC & Differential [[id number]] Collected:  08/07/17 0526    Specimen:  Blood Updated:  08/07/17 0626    Narrative:       The following orders were created for panel order CBC & Differential.  Procedure                               Abnormality         Status                     ---------                               -----------         ------                     Scan Slide[[id number]]                                                                  CBC Auto Differential[[id number]]        Abnormal            Final result                 Please view results for these tests on the individual orders.    Magnesium [[id number]]  (Normal) Collected:  08/07/17 0526    Specimen:  Blood Updated:  08/07/17 0640     Magnesium 1.7 mg/dL     Comprehensive Metabolic Panel [[id number]]  (Abnormal) Collected:  08/07/17 0526    Specimen:  Blood Updated:  08/07/17 0640     Glucose 64 mg/dL      BUN 14 mg/dL      Creatinine 0.45 (L) mg/dL      Sodium 131 (L) mmol/L      Potassium 3.0 (L) mmol/L       Chloride 96 mmol/L      CO2 29.0 mmol/L      Calcium 7.1 (L) mg/dL      Total Protein 4.5 (L) g/dL      Albumin 2.00 (L) g/dL      ALT (SGPT) 57 (H) U/L      AST (SGOT) 30 U/L      Alkaline Phosphatase 79 U/L      Total Bilirubin 2.2 (H) mg/dL      eGFR  African Amer 189 (H) mL/min/1.73      Globulin 2.5 gm/dL      A/G Ratio 0.8 (L) g/dL      BUN/Creatinine Ratio 31.1 (H)     Anion Gap 6.0 mmol/L     Bilirubin, Direct [[id number]]  (Abnormal) Collected:  08/07/17 0526    Specimen:  Blood Updated:  08/07/17 0640     Bilirubin, Direct 0.4 (H) mg/dL     CBC & Differential [[id number]] Collected:  08/07/17 1220    Specimen:  Blood Updated:  08/07/17 1235    Narrative:       The following orders were created for panel order CBC & Differential.  Procedure                               Abnormality         Status                     ---------                               -----------         ------                     Scan Slide[[id number]]                                                                  CBC Auto Differential[[id number]]        Abnormal            Final result                 Please view results for these tests on the individual orders.    CBC Auto Differential [[id number]]  (Abnormal) Collected:  08/07/17 1220    Specimen:  Blood Updated:  08/07/17 1235     WBC 6.32 10*3/mm3      RBC 3.00 (L) 10*6/mm3      Hemoglobin 8.9 (L) g/dL      Hematocrit 26.3 (L) %      MCV 87.7 fL      MCH 29.7 pg      MCHC 33.8 g/dL      RDW 15.1 (H) %      RDW-SD 47.7 (H) fl      MPV 11.6 fL      Platelets 31 (L) 10*3/mm3      Neutrophil % 85.7 (H) %      Lymphocyte % 7.6 (L) %      Monocyte % 5.4 %      Eosinophil % 0.2 %      Basophil % 0.3 %      Immature Grans % 0.8 (H) %      Neutrophils, Absolute 5.42 10*3/mm3      Lymphocytes, Absolute 0.48 (L) 10*3/mm3      Monocytes, Absolute 0.34 10*3/mm3      Eosinophils, Absolute 0.01 10*3/mm3      Basophils, Absolute 0.02 10*3/mm3      Immature Grans, Absolute 0.05 (H) 10*3/mm3      Extra Tubes [[id number]] Collected:  08/07/17 1221    Specimen:  Blood from Blood, Venous Line Updated:  08/07/17 1401    Narrative:       The following orders were created for panel order Extra Tubes.  Procedure                               Abnormality         Status                     ---------                               -----------         ------                     Lavender Top[[id number]]                                     Final result               Green Top (Gel)[[id number]]                                  Final result                 Please view results for these tests on the individual orders.    Lavender Top [[id number]] Collected:  08/07/17 1221    Specimen:  Blood Updated:  08/07/17 1401     Extra Tube hold for add-on      Auto resulted       Green Top (Gel) [[id number]] Collected:  08/07/17 1221    Specimen:  Blood Updated:  08/07/17 1401     Extra Tube Hold for add-ons.      Auto resulted.       CBC Auto Differential [[id number]]  (Abnormal) Collected:  08/07/17 1607    Specimen:  Blood Updated:  08/07/17 1619     WBC 6.55 10*3/mm3      RBC 3.48 (L) 10*6/mm3      Hemoglobin 10.2 (L) g/dL      Hematocrit 30.7 (L) %      MCV 88.2 fL      MCH 29.3 pg      MCHC 33.2 g/dL      RDW 15.2 (H) %      RDW-SD 48.6 (H) fl      MPV 12.8 (H) fL      Platelets 35 (L) 10*3/mm3      Neutrophil % 84.6 (H) %      Lymphocyte % 9.2 (L) %      Monocyte % 4.3 %      Eosinophil % 0.6 %      Basophil % 0.5 %      Immature Grans % 0.8 (H) %      Neutrophils, Absolute 5.55 10*3/mm3      Lymphocytes, Absolute 0.60 10*3/mm3      Monocytes, Absolute 0.28 10*3/mm3      Eosinophils, Absolute 0.04 10*3/mm3      Basophils, Absolute 0.03 10*3/mm3      Immature Grans, Absolute 0.05 (H) 10*3/mm3      nRBC 0.0 /100 WBC     Potassium [[id number]]  (Normal) Collected:  08/07/17 1607    Specimen:  Blood Updated:  08/07/17 1622     Potassium 4.0 mmol/L     CBC & Differential [[id number]] Collected:  08/07/17 1607    Specimen:  Blood Updated:   08/07/17 2130    Narrative:       The following orders were created for panel order CBC & Differential.  Procedure                               Abnormality         Status                     ---------                               -----------         ------                     Scan Slide[[id number]]                                       Final result               CBC Auto Differential[[id number]]        Abnormal            Final result                 Please view results for these tests on the individual orders.    Scan Slide [[id number]] Collected:  08/07/17 1607    Specimen:  Blood Updated:  08/07/17 2130     Anisocytosis Slight/1+     Hypochromia Slight/1+     Poikilocytes Slight/1+     WBC Morphology Normal     Platelet Estimate Decreased    CBC Auto Differential [[id number]]  (Abnormal) Collected:  08/08/17 0001    Specimen:  Blood Updated:  08/08/17 0114     WBC 6.07 10*3/mm3      RBC 3.05 (L) 10*6/mm3      Hemoglobin 9.0 (L) g/dL      Hematocrit 26.6 (L) %      MCV 87.2 fL      MCH 29.5 pg      MCHC 33.8 g/dL      RDW 15.1 (H) %      RDW-SD 47.5 (H) fl      MPV 12.3 (H) fL      Platelets 34 (L) 10*3/mm3     Extra Tubes [[id number]] Collected:  08/08/17 0001    Specimen:  Blood from Blood, Venous Line Updated:  08/08/17 0202    Narrative:       The following orders were created for panel order Extra Tubes.  Procedure                               Abnormality         Status                     ---------                               -----------         ------                     Green Top (Gel)[[id number]]                                  Final result                 Please view results for these tests on the individual orders.    Green Top (Gel) [[id number]] Collected:  08/08/17 0001    Specimen:  Blood Updated:  08/08/17 0202     Extra Tube Hold for add-ons.      Auto resulted.       CBC Auto Differential [[id number]]  (Abnormal) Collected:  08/08/17 0435    Specimen:  Blood Updated:  08/08/17 0455     WBC 5.98  10*3/mm3      RBC 3.05 (L) 10*6/mm3      Hemoglobin 9.0 (L) g/dL      Hematocrit 26.7 (L) %      MCV 87.5 fL      MCH 29.5 pg      MCHC 33.7 g/dL      RDW 15.1 (H) %      RDW-SD 48.2 (H) fl      MPV 12.4 (H) fL      Platelets 31 (L) 10*3/mm3      Neutrophil % 87.4 (H) %      Lymphocyte % 8.4 (L) %      Monocyte % 3.5 %      Eosinophil % 0.2 %      Basophil % 0.2 %      Immature Grans % 0.3 %      Neutrophils, Absolute 5.23 10*3/mm3      Lymphocytes, Absolute 0.50 (L) 10*3/mm3      Monocytes, Absolute 0.21 10*3/mm3      Eosinophils, Absolute 0.01 10*3/mm3      Basophils, Absolute 0.01 10*3/mm3      Immature Grans, Absolute 0.02 10*3/mm3     CBC & Differential [[id number]] Collected:  08/08/17 0435    Specimen:  Blood Updated:  08/08/17 0455    Narrative:       The following orders were created for panel order CBC & Differential.  Procedure                               Abnormality         Status                     ---------                               -----------         ------                     Scan Slide[[id number]]                                                                  CBC Auto Differential[[id number]]        Abnormal            Final result                 Please view results for these tests on the individual orders.    Magnesium [[id number]]  (Normal) Collected:  08/08/17 0435    Specimen:  Blood Updated:  08/08/17 0500     Magnesium 1.6 mg/dL     Bilirubin, Direct [[id number]]  (Normal) Collected:  08/08/17 0435    Specimen:  Blood Updated:  08/08/17 0500     Bilirubin, Direct 0.0 mg/dL     Comprehensive Metabolic Panel [[id number]]  (Abnormal) Collected:  08/08/17 0435    Specimen:  Blood Updated:  08/08/17 0505     Glucose 116 (H) mg/dL      BUN 18 mg/dL      Creatinine 0.51 mg/dL      Sodium 132 (L) mmol/L      Potassium 3.5 mmol/L      Chloride 96 mmol/L      CO2 30.0 mmol/L      Calcium 7.6 (L) mg/dL      Total Protein 4.3 (L) g/dL      Albumin 1.90 (L) g/dL      ALT (SGPT) 57 (H) U/L      AST  (SGOT) 34 U/L      Alkaline Phosphatase 87 U/L      Total Bilirubin 1.2 mg/dL      eGFR   Amer 164 (H) mL/min/1.73      Globulin 2.4 gm/dL      A/G Ratio 0.8 (L) g/dL      BUN/Creatinine Ratio 35.3 (H)     Anion Gap 6.0 mmol/L     CBC & Differential [[id number]] Collected:  08/08/17 0001    Specimen:  Blood Updated:  08/08/17 0607    Narrative:       The following orders were created for panel order CBC & Differential.  Procedure                               Abnormality         Status                     ---------                               -----------         ------                     Manual Differential[[id number]]          Abnormal            Final result               Scan Slide[[id number]]                                                                  CBC Auto Differential[[id number]]        Abnormal            Final result                 Please view results for these tests on the individual orders.    Manual Differential [[id number]]  (Abnormal) Collected:  08/08/17 0001    Specimen:  Blood Updated:  08/08/17 0607     Neutrophil % 67.0 %      Lymphocyte % 6.0 (L) %      Monocyte % 8.0 %      Basophil % 1.0 %      Bands %  16.0 (H) %      Myelocyte % 2.0 (H) %      Neutrophils Absolute 5.04 10*3/mm3      Lymphocytes Absolute 0.36 (L) 10*3/mm3      Monocytes Absolute 0.49 10*3/mm3      Basophils Absolute 0.06 10*3/mm3      Hypochromia Slight/1+     Target Cells Slight/1+     Toxic Granulation Slight/1+     Vacuolated Neutrophils Slight/1+     Platelet Estimate Decreased    CBC (No Diff) [[id number]]  (Abnormal) Collected:  08/09/17 0228    Specimen:  Blood Updated:  08/09/17 0320     WBC 6.09 10*3/mm3      RBC 2.96 (L) 10*6/mm3      Hemoglobin 8.6 (L) g/dL      Hematocrit 26.0 (L) %      MCV 87.8 fL      MCH 29.1 pg      MCHC 33.1 g/dL      RDW 15.1 (H) %      RDW-SD 48.2 (H) fl      MPV 13.3 (H) fL      Platelets 27 (L) 10*3/mm3     Comprehensive Metabolic Panel [[id number]]  (Abnormal) Collected:   08/09/17 0228    Specimen:  Blood Updated:  08/09/17 0336     Glucose 99 mg/dL      BUN 24 (H) mg/dL      Creatinine 0.50 mg/dL      Sodium 133 (L) mmol/L      Potassium 3.4 (L) mmol/L      Chloride 97 mmol/L      CO2 31.0 mmol/L      Calcium 7.8 (L) mg/dL      Total Protein 4.5 (L) g/dL      Albumin 2.00 (L) g/dL      ALT (SGPT) 48 U/L      AST (SGOT) 18 U/L      Alkaline Phosphatase 81 U/L      Total Bilirubin 1.0 mg/dL      eGFR   Amer 167 (H) mL/min/1.73      Globulin 2.5 gm/dL      A/G Ratio 0.8 (L) g/dL      BUN/Creatinine Ratio 48.0 (H)     Anion Gap 5.0 mmol/L     Magnesium [[id number]]  (Abnormal) Collected:  08/09/17 0228    Specimen:  Blood Updated:  08/09/17 0336     Magnesium 1.5 (L) mg/dL     Potassium [[id number]]  (Normal) Collected:  08/09/17 1605    Specimen:  Blood Updated:  08/09/17 1630     Potassium 4.1 mmol/L     Comprehensive Metabolic Panel [[id number]]  (Abnormal) Collected:  08/10/17 0354    Specimen:  Blood Updated:  08/10/17 0447     Glucose 73 mg/dL      BUN 26 (H) mg/dL      Creatinine 0.58 mg/dL      Sodium 132 (L) mmol/L      Potassium 3.7 mmol/L      Chloride 95 mmol/L      CO2 31.0 mmol/L      Calcium 7.6 (L) mg/dL      Total Protein 4.6 (L) g/dL      Albumin 1.90 (L) g/dL      ALT (SGPT) 42 U/L      AST (SGOT) 14 U/L      Alkaline Phosphatase 92 U/L      Total Bilirubin 2.0 (H) mg/dL      eGFR   Amer 141 mL/min/1.73      Globulin 2.7 gm/dL      A/G Ratio 0.7 (L) g/dL      BUN/Creatinine Ratio 44.8 (H)     Anion Gap 6.0 mmol/L     Magnesium [[id number]]  (Normal) Collected:  08/10/17 0354    Specimen:  Blood Updated:  08/10/17 0453     Magnesium 2.0 mg/dL     CBC Auto Differential [[id number]]  (Abnormal) Collected:  08/10/17 1839    Specimen:  Blood Updated:  08/10/17 1913     WBC 9.21 10*3/mm3      RBC 2.95 (L) 10*6/mm3      Hemoglobin 8.5 (L) g/dL      Hematocrit 25.8 (L) %      MCV 87.5 fL      MCH 28.8 pg      MCHC 32.9 g/dL      RDW 15.1 (H) %      RDW-SD 47.7 (H) fl       MPV 13.1 (H) fL      Platelets 31 (L) 10*3/mm3      Neutrophil % 89.7 (H) %      Lymphocyte % 6.4 (L) %      Monocyte % 2.8 %      Eosinophil % 0.1 %      Basophil % 0.2 %      Immature Grans % 0.8 (H) %      Neutrophils, Absolute 8.26 10*3/mm3      Lymphocytes, Absolute 0.59 (L) 10*3/mm3      Monocytes, Absolute 0.26 10*3/mm3      Eosinophils, Absolute 0.01 10*3/mm3      Basophils, Absolute 0.02 10*3/mm3      Immature Grans, Absolute 0.07 (H) 10*3/mm3      nRBC 0.3 (H) /100 WBC     CBC & Differential [[id number]] Collected:  08/10/17 1839    Specimen:  Blood Updated:  08/10/17 2053    Narrative:       The following orders were created for panel order CBC & Differential.  Procedure                               Abnormality         Status                     ---------                               -----------         ------                     Scan Slide[[id number]]                                       Final result               CBC Auto Differential[[id number]]        Abnormal            Final result                 Please view results for these tests on the individual orders.    Scan Slide [[id number]] Collected:  08/10/17 1839    Specimen:  Blood Updated:  08/10/17 2053     Anisocytosis Slight/1+     Hypochromia Slight/1+     Poikilocytes Slight/1+     WBC Morphology Normal     Platelet Estimate Decreased    Magnesium [[id number]]  (Normal) Collected:  08/11/17 0406    Specimen:  Blood Updated:  08/11/17 0444     Magnesium 1.8 mg/dL     Comprehensive Metabolic Panel [[id number]]  (Abnormal) Collected:  08/11/17 0406    Specimen:  Blood Updated:  08/11/17 0449     Glucose 68 mg/dL      BUN 36 (H) mg/dL      Creatinine 0.93 mg/dL      Sodium 129 (L) mmol/L      Potassium 4.0 mmol/L      Chloride 92 (L) mmol/L      CO2 27.0 mmol/L      Calcium 7.6 (L) mg/dL      Total Protein 4.8 (L) g/dL      Albumin 2.00 (L) g/dL      ALT (SGPT) 42 U/L      AST (SGOT) 16 U/L      Alkaline Phosphatase 99 U/L      Total Bilirubin 2.6  (H) mg/dL      eGFR   Amer 82 mL/min/1.73      Globulin 2.8 gm/dL      A/G Ratio 0.7 (L) g/dL      BUN/Creatinine Ratio 38.7 (H)     Anion Gap 10.0 mmol/L     CBC (No Diff) [[id number]]  (Abnormal) Collected:  08/11/17 0406    Specimen:  Blood Updated:  08/11/17 0503     WBC 10.68 (H) 10*3/mm3      RBC 2.84 (L) 10*6/mm3      Hemoglobin 8.4 (L) g/dL      Hematocrit 24.7 (L) %      MCV 87.0 fL      MCH 29.6 pg      MCHC 34.0 g/dL      RDW 15.1 (H) %      RDW-SD 47.6 (H) fl      MPV 12.7 (H) fL      Platelets 30 (L) 10*3/mm3     Alpha - 1 - Antitrypsin Phenotype [[id number]]  (Abnormal) Collected:  08/02/17 2004    Specimen:  Blood Updated:  08/11/17 1315     A-1 Antitrypsin 268 (H) mg/dL      Phenotype MM             Phenotype   Population      A-1-AT Concentration                     Incidence %      Reference Interval         MM            86.5%                96 - 189         MS             8.0%                83 - 161         MZ             3.9%                60 - 111         FM             0.4%                93 - 191         SZ             0.3%                42 -  75         SS             0.1%                62 - 119         ZZ             0.05%               16 -  38         FS             0.05%               70 - 128         FZ            Unknown              44 -  88         FF            Unknown              Unknown       Narrative:       Performed at:  01  [facility]  [id number]  : Adria Howe PhD, Phone:  [phone]  Performed at:  02 - [facility]  [id number]  : Rakan Campos MD, Phone:  [phone]    Comprehensive Metabolic Panel [[id number]]  (Abnormal) Collected:  08/12/17 0454    Specimen:  Blood Updated:  08/12/17 0510     Glucose 74 mg/dL      BUN 38 (H) mg/dL      Creatinine 0.94 mg/dL      Sodium 132 (L) mmol/L      Potassium 3.6 mmol/L      Chloride 93 (L) mmol/L      CO2 25.0 mmol/L       Calcium 8.0 (L) mg/dL      Total Protein 5.3 (L) g/dL      Albumin 2.70 (L) g/dL      ALT (SGPT) 36 U/L      AST (SGOT) 15 U/L      Alkaline Phosphatase 74 U/L      Total Bilirubin 3.5 (H) mg/dL      eGFR   Amer 81 mL/min/1.73      Globulin 2.6 gm/dL      A/G Ratio 1.0 (L) g/dL      BUN/Creatinine Ratio 40.4 (H)     Anion Gap 14.0 mmol/L     Magnesium [[id number]]  (Normal) Collected:  08/12/17 0454    Specimen:  Blood Updated:  08/12/17 0510     Magnesium 1.9 mg/dL     CBC Auto Differential [[id number]]  (Abnormal) Collected:  08/12/17 0936    Specimen:  Blood Updated:  08/12/17 1019     WBC 8.45 10*3/mm3      RBC 2.48 (L) 10*6/mm3      Hemoglobin 7.1 (L) g/dL      Hematocrit 21.4 (L) %      MCV 86.3 fL      MCH 28.6 pg      MCHC 33.2 g/dL      RDW 15.3 (H) %      RDW-SD 47.7 (H) fl      MPV -- fL       INSTRUMENT UNABLE TO CALCULATE RESULTS        Platelets 17 (C) 10*3/mm3       VERIFIED RESULTS REPEAT ANALYSIS        Neutrophil % 94.4 (H) %      Lymphocyte % 1.8 (L) %      Monocyte % 2.4 %      Eosinophil % 0.1 %      Basophil % 0.2 %      Immature Grans % 1.1 (H) %      Neutrophils, Absolute 7.98 10*3/mm3      Lymphocytes, Absolute 0.15 (L) 10*3/mm3      Monocytes, Absolute 0.20 10*3/mm3      Eosinophils, Absolute 0.01 10*3/mm3      Basophils, Absolute 0.02 10*3/mm3      Immature Grans, Absolute 0.09 (H) 10*3/mm3      nRBC 0.4 (H) /100 WBC     Extra Tubes [[id number]] Collected:  08/12/17 0937    Specimen:  Blood from Blood, Venous Line Updated:  08/12/17 1101    Narrative:       The following orders were created for panel order Extra Tubes.  Procedure                               Abnormality         Status                     ---------                               -----------         ------                     [facility] - [facility][[id number]]                                   Final result                 Please view results for these tests on the individual orders.    [facility] [[id number]] Collected:   08/12/17 0937    Specimen:  Blood Updated:  08/12/17 1101     Extra Tube Hold for add-ons.      Auto resulted.       CBC & Differential [[id number]] Collected:  08/12/17 0936    Specimen:  Blood Updated:  08/12/17 1144    Narrative:       The following orders were created for panel order CBC & Differential.  Procedure                               Abnormality         Status                     ---------                               -----------         ------                     Scan Slide[[id number]]                                       Final result               CBC Auto Differential[[id number]]        Abnormal            Final result                 Please view results for these tests on the individual orders.    Scan Slide [[id number]] Collected:  08/12/17 0936    Specimen:  Blood Updated:  08/12/17 1144     Anisocytosis Slight/1+      Rare polychromic cell        WBC Morphology Normal     Platelet Estimate Decreased    Extra Tubes [[id number]] Collected:  08/12/17 1539    Specimen:  Blood from Blood, Venous Line Updated:  08/12/17 1701    Narrative:       The following orders were created for panel order Extra Tubes.  Procedure                               Abnormality         Status                     ---------                               -----------         ------                     Lavender Top[[id number]]                                     Final result               Gold Top - [facility][[id number]]                                   Final result                 Please view results for these tests on the individual orders.    Lavender Top [[id number]] Collected:  08/12/17 1539    Specimen:  Blood Updated:  08/12/17 1701     Extra Tube hold for add-on      Auto resulted       Gold Top - [facility] [[id number]] Collected:  08/12/17 1559    Specimen:  Blood Updated:  08/12/17 1701     Extra Tube Hold for add-ons.      Auto resulted.       Blood Gas, Arterial [[id number]]  (Abnormal) Collected:  08/13/17 0441    Specimen:   Arterial Blood Updated:  08/13/17 0504     Site --      Not performed at this site.        Martin's Test --      Not performed at this site.        pH, Arterial 7.499 (H) pH units      pCO2, Arterial 34.2 (L) mm Hg      pO2, Arterial 99.5 mm Hg      HCO3, Arterial 26.0 mmol/L      Base Excess, Arterial 2.9 (H) mmol/L      O2 Saturation, Arterial 97.3 %      Hemoglobin, Blood Gas 9.6 (L) g/dL      Hematocrit, Blood Gas 28.0 %      CO2 Content 27.1 (H)     Sodium, Arterial 137.3 (L) mmol/L      Potassium, Arterial 2.74 (L) mmol/L      Glucose, Arterial 40 mmol/L      Barometric Pressure for Blood Gas -- mmHg       Not performed at this site.        Modality --      Not performed at this site.        Ionized Calcium 3.9 (L) mg/dL     Blood Culture ID, PCR [[id number]]  (Abnormal) Collected:  08/12/17 1602    Specimen:  Blood from Neck Updated:  08/13/17 0508     BCID, PCR Streptococcus spp, not A, B, or pneumoniae. Identification by BCID PCR. (C)     BCID, PCR 2 Escherichia coli. Identification by BCID PCR. (C)    Narrative:         Sensitivity to Follow    Blood Culture [[id number]]  (Abnormal) Collected:  08/12/17 1602    Specimen:  Blood from Neck Updated:  08/13/17 0508     Blood Culture Abnormal Stain (A)     Gram Stain Result Anaerobic Bottle Gram positive cocci in clusters      1 of 4         Aerobic Bottle Gram positive cocci in clusters      2 of 4         Anaerobic Bottle Gram negative bacilli      1 of 4         Aerobic Bottle Gram negative bacilli      2 of 4       Blood Culture [[id number]]  (Abnormal) Collected:  08/12/17 1559    Specimen:  Blood from Arm, Right Updated:  08/13/17 0509     Blood Culture Abnormal Stain (A)     Gram Stain Result Aerobic Bottle Gram positive cocci in clusters      3 of 4         Anaerobic Bottle Gram negative bacilli      3 of 4         Aerobic Bottle Gram positive cocci in clusters      4 of 4         Anaerobic Bottle Gram negative bacilli      4 of 4       CBC (No Diff)  [[id number]]  (Abnormal) Collected:  08/13/17 0628    Specimen:  Blood Updated:  08/13/17 0637     WBC 9.65 10*3/mm3      RBC 3.21 (L) 10*6/mm3      Hemoglobin 9.3 (L) g/dL      Hematocrit 27.0 (L) %      MCV 84.1 fL      MCH 29.0 pg      MCHC 34.4 g/dL      RDW 15.9 (H) %      RDW-SD 48.4 (H) fl      MPV 11.4 fL      Platelets 37 (L) 10*3/mm3     Magnesium [[id number]]  (Normal) Collected:  08/13/17 0627    Specimen:  Blood Updated:  08/13/17 0639     Magnesium 1.8 mg/dL     Comprehensive Metabolic Panel [[id number]]  (Abnormal) Collected:  08/13/17 0627    Specimen:  Blood Updated:  08/13/17 0652     Glucose 34 (C) mg/dL      BUN 29 (H) mg/dL      Creatinine 0.88 mg/dL      Sodium 139 mmol/L      Potassium 2.7 (C) mmol/L      Chloride 96 mmol/L      CO2 26.0 mmol/L      Calcium 7.8 (L) mg/dL      Total Protein 6.0 (L) g/dL      Albumin 3.10 (L) g/dL      ALT (SGPT) 48 U/L      AST (SGOT) 65 (H) U/L      Alkaline Phosphatase 86 U/L      Total Bilirubin 7.6 (H) mg/dL      eGFR   Amer 87 mL/min/1.73      Globulin 2.9 gm/dL      A/G Ratio 1.1 g/dL      BUN/Creatinine Ratio 33.0 (H)     Anion Gap 17.0 (H) mmol/L     Urine Culture [[id number]]  (Abnormal) Collected:  08/12/17 1838    Specimen:  Urine from Urine, Catheter Updated:  08/13/17 0746     Urine Culture >100,000 CFU/mL Gram Negative Bacilli (A)    Calcium, Ionized [[id number]]  (Abnormal) Collected:  08/13/17 1126    Specimen:  Blood Updated:  08/13/17 1146     Ionized Calcium 4.0 (L) mg/dL     Phosphorus [[id number]]  (Normal) Collected:  08/13/17 1126    Specimen:  Blood Updated:  08/13/17 1159     Phosphorus 3.6 mg/dL     Triglycerides [[id number]]  (Normal) Collected:  08/13/17 1126    Specimen:  Blood Updated:  08/13/17 1159     Triglycerides 71 mg/dL     Ammonia [[id number]]  (Abnormal) Collected:  08/13/17 1126    Specimen:  Blood Updated:  08/13/17 1202     Ammonia 59 (H) umol/L     Prealbumin [[id number]]  (Abnormal) Collected:  08/13/17 1126     Specimen:  Blood Updated:  08/13/17 1202     Prealbumin 5.6 (L) mg/dL     C-reactive Protein [[id number]]  (Abnormal) Collected:  08/13/17 1126    Specimen:  Blood Updated:  08/13/17 1211     C-Reactive Protein 32.60 (H) mg/dL     Cholesterol, Total [[id number]]  (Normal) Collected:  08/13/17 1126    Specimen:  Blood Updated:  08/13/17 1232     Total Cholesterol <50 mg/dL     Extra Tubes [[id number]] Collected:  08/13/17 1126    Specimen:  Blood from Blood, Venous Line Updated:  08/13/17 1301    Narrative:       The following orders were created for panel order Extra Tubes.  Procedure                               Abnormality         Status                     ---------                               -----------         ------                     Lavender Top[[id number]]                                     Final result                 Please view results for these tests on the individual orders.    Lavender Top [[id number]] Collected:  08/13/17 1126    Specimen:  Blood Updated:  08/13/17 1301     Extra Tube hold for add-on      Auto resulted       POC Glucose Fingerstick [[id number]]  (Abnormal) Collected:  08/13/17 0702    Specimen:  Blood Updated:  08/13/17 1301     Glucose 162 (H) mg/dL       RN NotifiedMeter: [id number]: [id number] MARIELENAMARTHA ORTIZ       POC Glucose Fingerstick [[id number]]  (Abnormal) Collected:  08/13/17 0838    Specimen:  Blood Updated:  08/13/17 1301     Glucose 67 (L) mg/dL       Meter: [id number]: [id number] [facility] COURSE:  Active Hospital Problems (** Indicates Principal Problem)    Diagnosis Date Noted   • **Alcoholic cirrhosis of liver with ascites [K70.31] 07/19/2017 08/13/17.  Total bilirubin up again today.  Ascites still stable.  Family meeting yesterday patient made DNR/DNI.  Family aware of poor prognosis.    08/12/17.  Total bilirubin trending up today.  Ascites appears to be stable but still present.  Plan for family meeting today due to  poor prognosis.    08/11/17.  Stable.  Mentation improved.  Severe malnutrition.  Not a candidate for PEG tube.    CIWA.  GI following.  Await recommendations.     • Septic shock [A41.9, R65.21] 08/13/2017 08/13/17.  Patient condition worsened acutely yesterday with increasing heart rate and blood pressure dropping.  Pan cultures were ordered.  Antibiotics - Levaquin, Vanc, Zosyn ordered overnight.  Afebrile, No elevation in white count, but worsening heart rate, respiratory rate, and blood pressure.  Patient started on phenylephrine with attempt to keep MAP above 65.       • Severe malnutrition [E43] 08/13/2017     Patient has muscle wasting and severe malnutrition.  Per patient she did not want a feed tube on any kind (PEG/OG/NG).  She didn't want supplemental feeding as well as she was attempting to increase by mouth intake.  Patient's by mouth intake has been decreasing over the last 2 days after she had a few days where she fed better.  Discussed with mom and beside this morning.  Family wishes for TPN at this time.  Will start TPN.     • Liver failure [K72.90] 08/13/2017     Worsening liver failure with increase in encephalopathy.  Ammonia pending today.  Patient's Total bilirubin also increased overnight.  Secondary to cirrhosis from long standing alcohol abuse.     • Hypomagnesemia [E83.42] 08/09/2017     Replace.  Recheck     • Thrombocytopenia [D69.6] 08/09/2017 08/13/17.  Platelets increased today after platelets given.  Monitor.    Results from last 7 days  Lab Units 08/13/17  0628 08/12/17  0936 08/11/17  0406 08/10/17  1839 08/09/17  0228   PLATELETS 10*3/mm3 37* 17* 30* 31* 27*     08/12/17.  Drop in platelets today.  Total bilirubin increasing.  Suspect worsening liver failure.      Results from last 7 days  Lab Units 08/12/17  0936 08/11/17  0406 08/10/17  1839 08/09/17  0228 08/08/17  0435   PLATELETS 10*3/mm3 17* 30* 31* 27* 31*       08/11/17.  Platelets stable.  Monitor.    Results from  last 7 days  Lab Units 08/11/17  0406 08/10/17  1839 08/09/17  0228 08/08/17  0435 08/08/17  0001   PLATELETS 10*3/mm3 30* 31* 27* 31* 34*     08/10/17.  CBC pending today.  Following platelets.    Secondary to cirrhosis.  Following platelets.  Been stable around 30.      Results from last 7 days  Lab Units 08/09/17  0228 08/08/17  0435 08/08/17  0001 08/07/17  1607 08/07/17  1220 08/07/17  0526 08/06/17  1831   PLATELETS 10*3/mm3 27* 31* 34* 35* 31* 42* 33*          • Hypokalemia [E87.6] 08/07/2017 08/13/17.  Potassium 2.4 today.  Will give replace today.    08/10/17.  At goal today.  Monitor.    08/09/17.  Replace today.  Magnesium low today.  Replace both.  Recheck.  Monitor.    08/08/17.  At goal today.  Monitor.    Replace.  Recheck.  Magnesium at 1.7 mg/dL (Normal)     • Traumatic pneumothorax [S27.0XXA] 08/07/2017 08/13/17.  Per x-ray yesterday.  No visible pneumothorax.  Appears to have resolved.    Imaging Results (last 72 hours)     Procedure Component Value Units Date/Time    XR Chest 1 View [[id number]] Collected:  08/10/17 1906     Updated:  08/10/17 1921    Narrative:         EXAM:         Radiograph(s), Chest   VIEWS:   Portable ; 1       DATE/TIME: 8/10/2017 7:18 PM CDT               INDICATION:     Pneumothorax follow-up      COMPARISON:   CXR: 8/9/17          FINDINGS:           - lines/tubes:     Right approach jugular central venous catheter  in standard position, unchanged.     - cardiac:          size within normal limits         - mediastinum:  contour within normal limits         - lungs:          no focal air space process, pulmonary  interstitial edema, nodule(s)/mass             - pleura:          Minimal right-sided pneumothorax, loculated in  the inferior/lateral aspect, marginally decreased in size.                   - osseous:          unremarkable for age                  - misc.:        Impression:       CONCLUSION:        1. Decreasing minimal right pneumothorax.                         Electronically signed by:  SHAR Melendrez MD  8/10/2017 7:20  PM CDT Workstation: [id number]    XR Chest 1 View [[id number]] Collected:  08/12/17 0416     Updated:  08/12/17 0504    Narrative:       Exam: AP portable chest    INDICATION: Pneumothorax    COMPARISON: 11/10/2017    FINDINGS: AP portable chest. Right IJ central venous catheter tip  is at the cavoatrial junction. The cardiomediastinal silhouette  is unremarkable. No visible right pneumothorax. Atelectasis in  lung bases.      Impression:       No significant change.    Electronically signed by:  Jose Angel Chávez MD  8/12/2017 5:03 AM  CDT Workstation: [id number]        08/11/17.  Decreasing per x-ray.  X-ray ordered for tomorrow morning.    08/10/17.  Persistent but small.  X-ray from today pending.    08/09/17.  X-ray pending.  Follow x-ray.  No respiratory distress.    08/08/17.  Improving.  Almost resolved per x-ray.  Will continue to monitor.    Imaging Results (last 24 hours)     Procedure Component Value Units Date/Time    XR Chest 1 View [[id number]] Collected:  08/07/17 1823     Updated:  08/07/17 1835    Narrative:         EXAM:         Radiograph(s), Chest   VIEWS:   Portable ; 1       DATE/TIME: 8/7/2017 6:32 PM CDT               INDICATION:     Pneumothorax follow-up      COMPARISON:   CXR: 8/7/17 at 04:14 hours          FINDINGS:           - lines/tubes:     Right approach central venous catheter,  unchanged.     - cardiac:          size within normal limits         - mediastinum:  contour within normal limits         - lungs:          no focal air space process, pulmonary  interstitial edema, nodule(s)/mass             - pleura:          Previously described right-sided pneumothorax  has decreased in size with a small residual pneumothorax  remaining inferiorly/laterally on the right.                  - osseous:          unremarkable for age                  - misc.:        Impression:       CONCLUSION:        1. Interval  decrease in size of the right pneumothorax.    Maintain short interval radiographic follow-up to document  resolution.                      Electronically signed by:  SHAR Melendrez MD  8/7/2017 6:34 PM  CDT Workstation: [id number]    XR Chest 1 View [[id number]] Collected:  08/08/17 0816     Updated:  08/08/17 0837    Narrative:       Chest single view.     CLINICAL INDICATION: Shortness of breath. Right-sided  pneumothorax, follow-up.    COMPARISON: Chest x-ray August 7, 2017.    FINDINGS: Cardiac silhouette is normal in size. Pulmonary  vascularity is unremarkable.     Small right-sided pneumothorax less than 5%.     0.9 cm of space between the pleural reflection and the chest wall  at the right lung base. Similar size to prior examination August 7, 2017.   Clearing of discoid infiltrative changes left lung base. Partial  clearing of discoid infiltrative, atelectatic changes right lung  base.      Impression:       CONCLUSION: Small right-sided pneumothorax unchanged since prior  examination. Clearing of discoid atelectatic changes at left lung  base. Partial clearing of discoid atelectatic changes right lung  base.    Electronically signed by:  Nickolas Gonzales MD  8/8/2017 8:36 AM CDT  Workstation: [id number]            Stable on x-ray.  Dr. Wang discussed case with CT surgery yesterday.  Monitoring.  Chest tube as needed with desaturation or worsening pneumothorax on x-ray.     • Hyponatremia [E87.1] 08/07/2017     Nephrology following.  Salt tab ordered.     • Ascites due to alcoholic cirrhosis [K70.31] 07/19/2017 08/13/17.  Ascites stable.  No increase in size per physical exam and daily weight.  Patient has been dealing with recurrent ascites outpatient that required therapeutic paracentesis.    GI following.  Monitor.  Paracentesis as needed, but with low platelet count, low blood pressure, and stable appearing ascites will discuss with family first in family meetings.     • Moderate episode of  recurrent major depressive disorder [F33.1] 2017   • Chronic midline low back pain [M54.5, G89.29] 2017   • Chronic pelvic pain in female [R10.2, G89.29] 2017      Resolved Hospital Problems    Diagnosis Date Noted Date Resolved   No resolved problems to display.     Patient was initially admitted with abdominal pain with known alcoholic cirrhosis and resulting ascites.  She had 10 Liters of clear serous fluid drained early in her admission.  Patient's ascites after paracentesis remained stable with gastroenterology following closely on the case.  Patient was thought to have some encephalopathy from her liver, but her ammonia level early in the clinical course remained low.  Patient's by mouth intake has been very poor weeks to months prior to this admission and it remained poor throughout this admission.  I discussed nutrion with the patient on multiple occasions and she told me that she did not want any kind of feeding tube or alternate feeding.  Patient stated that she would increase her by mouth intake by eating more.  For a few days patient increased her intake, but overall patient's severe malnutrition and lack of protein and calories was present throughout this hospital stay.  On day of passing patient became encephalopathic and could no longer make decisions for herself and patient's mother requested TPN which we initiated.  Patient's overall clinical course worsened on the day before her passing.  She became tachycardic above baseline and her blood pressure began to drop.  Patient was made DNR/DNI by family on day before passing as patient's clinical condition worsened.  As patient's clinical condition worsened and patient appeared to be uncomfortable and in pain patient's mother decided to make patient comfort measures.  Patient passed peacefully at 1747 on 2017 with family at bedside.    DISCHARGE CONDITION:       Time: Discharge 50 min          This document has been  electronically signed by Luis F Jalloh MD on August 13, 2017 7:01 PM

## 2017-08-14 NOTE — THERAPY DISCHARGE NOTE
Acute Care - Physical Therapy Discharge Summary  [facility]       Patient Name: Joseline Parnell  : 1980  MRN: [id number]    Today's Date: 2017  Onset of Illness/Injury or Date of Surgery Date: 17    Date of Referral to PT: 17  Referring Physician: CRYSTAL Jalloh      Admit Date: 2017      PT Recommendation and Plan    Visit Dx:    ICD-10-CM ICD-9-CM   1. Alcoholic cirrhosis of liver with ascites K70.31 571.2   2. Hyponatremia E87.1 276.1   3. Hypoglycemia E16.2 251.2   4. Anemia, unspecified type D64.9 285.9   5. Respiratory alkalosis E87.3 276.3   6. Impaired functional mobility, balance, gait, and endurance Z74.09 V49.89   7. Impaired mobility and activities of daily living Z74.09 799.89                       IP PT Goals       17 0802 08/10/17 1256       Bed Mobility PT STG    Bed Mobility PT STG, Date Established  08/10/17  -MN     Bed Mobility PT STG, Time to Achieve  4 days  -MN     Bed Mobility PT STG, Activity Type  supine to sit/sit to supine  -MN     Bed Mobility PT STG, [place] Level  supervision required  -MN     Transfer Training Goal, Assist Device  bed rails  -MN     Bed Mobility PT STG, Date Goal Reviewed 17  -MN      Bed Mobility PT STG, Outcome goal not met  -MN      Bed Mobility PT STG, Reason Goal Not Met other (see comments)   pt   -MN      Transfer Training PT LTG    Transfer Training PT LTG, Date Established  08/10/17  -MN     Transfer Training PT LTG, Time to Achieve  2 wks  -MN     Transfer Training PT LTG, Activity Type  bed to chair /chair to bed;sit to stand/stand to sit;toilet  -MN     Transfer Training PT LTG, [place] Level  supervision required  -MN     Transfer Training PT  LTG, Date Goal Reviewed 17  -MN      Transfer Training PT LTG, Outcome goal not met  -MN      Transfer Training PT LTG, Reason Goal Not Met other (see comments)   pt   -MN      Gait Training PT LTG    Gait Training Goal PT LTG, Date Established   08/10/17  -MN     Gait Training Goal PT LTG, Time to Achieve  2 wks  -MN     Gait Training Goal PT LTG, [place] Level  supervision required  -MN     Gait Training Goal PT LTG, Assist Device  walker, rolling  -MN     Gait Training Goal PT LTG, Distance to Achieve  150 ft  -MN     Gait Training Goal PT LTG, Date Goal Reviewed 17  -MN      Gait Training Goal PT LTG, Outcome goal not met  -MN      Gait Training Goal PT LTG, Reason Goal Not Met other (see comments)   pt   -MN        User Key  (r) = Recorded By, (t) = Taken By, (c) = Cosigned By    Initials Name Provider Type    RODNEY Barakat, PT Physical Therapist              PT Discharge Summary  Anticipated Discharge Disposition: skilled nursing facility, long term acute care facility  Reason for Discharge: Patient       Beth Barakat, PT   2017

## 2017-08-14 NOTE — PLAN OF CARE
Problem: Inpatient Physical Therapy  Goal: Bed Mobility Goal STG- PT  Outcome: Unable to achieve outcome(s) by discharge Date Met:  17 0802   Bed Mobility PT STG   Bed Mobility PT STG, Date Goal Reviewed 17   Bed Mobility PT STG, Outcome goal not met   Bed Mobility PT STG, Reason Goal Not Met other (see comments)  (pt )       Goal: Transfer Training Goal 1 LTG- PT  Outcome: Unable to achieve outcome(s) by discharge Date Met:  17 0802   Transfer Training PT LTG   Transfer Training PT LTG, Date Goal Reviewed 17   Transfer Training PT LTG, Outcome goal not met   Transfer Training PT LTG, Reason Goal Not Met other (see comments)  (pt )       Goal: Gait Training Goal LTG- PT  Outcome: Unable to achieve outcome(s) by discharge Date Met:  17 0802   Gait Training PT LTG   Gait Training Goal PT LTG, Date Goal Reviewed 17   Gait Training Goal PT LTG, Outcome goal not met   Gait Training Goal PT LTG, Reason Goal Not Met other (see comments)  (pt )

## 2017-08-14 NOTE — THERAPY DISCHARGE NOTE
Acute Care - Occupational Therapy Discharge Summary  [facility]     Patient Name: Joseline Parnell  : 1980  MRN: [id number]    Today's Date: 2017  Onset of Illness/Injury or Date of Surgery Date: 17    Date of Referral to OT: 17  Referring Physician: CRYSTAL Jalloh      Admit Date: 2017        OT Recommendation and Plan    Visit Dx:    ICD-10-CM ICD-9-CM   1. Alcoholic cirrhosis of liver with ascites K70.31 571.2   2. Hyponatremia E87.1 276.1   3. Hypoglycemia E16.2 251.2   4. Anemia, unspecified type D64.9 285.9   5. Respiratory alkalosis E87.3 276.3   6. Impaired functional mobility, balance, gait, and endurance Z74.09 V49.89   7. Impaired mobility and activities of daily living Z74.09 799.89                     OT Goals       17 0910 08/10/17 1541       Transfer Training OT LTG    Transfer Training OT LTG, Date Established  08/10/17  -RB     Transfer Training OT LTG, Time to Achieve  by discharge  -RB     Transfer Training OT LTG, Activity Type  all transfers  -RB     Transfer Training OT LTG, [place] Level  supervision required  -RB     Transfer Training OT LTG, Assist Device  walker, rolling  -RB     Transfer Training OT LTG, Date Goal Reviewed 17  -RM      Transfer Training OT LTG, Outcome goal not met  -RM      Transfer Training OT LTG, Reason Goal Not Met discharged from facility  -RM      Strength OT LTG    Strength Goal OT LTG, Date Established  08/10/17  -RB     Strength Goal OT LTG, Time to Achieve  by discharge  -RB     Strength Goal OT LTG, Measure to Achieve  1-2 sets of 10 reps all planes with 1-2 lb wrist wts or dumbells to increase strength in B UEs.  -RB     Strength Goal OT LTG, Date Goal Reviewed 17  -RM      Strength Goal OT LTG, Outcome goal not met  -RM      Strength Goal OT LTG, Reason Goal Not Met discharged from facility  -RM      Static Standing Balance OT LTG    Static Standing Balance OT LTG, Date Established  08/10/17  -RB     Static  Standing Balance OT LTG, Time to Achieve  by discharge  -     Static Standing Balance OT LTG, [place] Level  conditional independence   5 minutes with functional activity.  -RB     Static Standing Balance OT LTG, Assist Device  assistive device   Rolling walker.  -     Static Standing Balance OT LTG, Date Goal Reviewed 17  -      Static Standing Balance OT LTG, Outcome goal not met  -      Static Standing Balance OT LTG, Reason Goal Not Met discharged from facility  -      ADL OT LTG    ADL OT LTG, Date Established  08/10/17  -     ADL OT LTG, Time to Achieve  by discharge  -     ADL OT LTG, Activity Type  ADL skills   Sponge bath and dress or walk-in shower.  -     ADL OT LTG, [place] Level  modified independent;assistive device   rolling walker if needed.  -     ADL OT LTG, Date Goal Reviewed 17  -      ADL OT LTG, Outcome goal not met  -      ADL OT LTG, Reason Goal Not Met discharged from facility  -        User Key  (r) = Recorded By, (t) = Taken By, (c) = Cosigned By    Initials Name Provider Type    JASON Armstrong, OT Occupational Therapist    LUCIAN Gan, OT Occupational Therapist                  OT Discharge Summary  Anticipated Discharge Disposition: home with home health, home with 24/7 care  Reason for Discharge: Patient   Outcomes Achieved: Refer to plan of care for updates on goals achieved  Discharge Destination: other (comment)      Shayna Gan OT  2017

## 2017-08-15 LAB
BACTERIA SPEC AEROBE CULT: ABNORMAL
BACTERIA SPEC AEROBE CULT: ABNORMAL
GRAM STN SPEC: ABNORMAL
ISOLATED FROM: ABNORMAL

## 2017-08-17 LAB
BACTERIA SPEC AEROBE CULT: ABNORMAL
BACTERIA SPEC AEROBE CULT: ABNORMAL
GRAM STN SPEC: ABNORMAL